# Patient Record
Sex: MALE | Race: WHITE | NOT HISPANIC OR LATINO | Employment: FULL TIME | URBAN - METROPOLITAN AREA
[De-identification: names, ages, dates, MRNs, and addresses within clinical notes are randomized per-mention and may not be internally consistent; named-entity substitution may affect disease eponyms.]

---

## 2017-01-05 ENCOUNTER — TELEPHONE (OUTPATIENT)
Dept: HEALTH INFORMATION MANAGEMENT | Facility: OTHER | Age: 27
End: 2017-01-05

## 2017-01-05 NOTE — TELEPHONE ENCOUNTER
I called patient and scheduled an appointment for 1/23/17 at 4:30PM. Instructed patient to bring in his old immunization records. - Care gap outreach

## 2017-01-05 NOTE — TELEPHONE ENCOUNTER
Received the following [employer] message from patient:    Appointment Request From: Justice Infante           With Provider: Demar Dobson M.D. [[address]]          Preferred Date Range: From 2/6/2017 To 3/27/2017          Preferred Times: Monday Afternoon, Tuesday Afternoon, Wednesday Afternoon, Thursday Afternoon, Friday Afternoon          Reason: To address the following health maintenance concerns.     Imm Hep B Vaccine     Imm Hep A Vaccine     Imm Hpv Vaccine     Imm Varicella (Chickenpox) Vaccine     Imm Dtap/Tdap/Td Vaccine     Imm Influenza          Comments:     Latest appointment possible during the day. Going to bring in my immunization record and see if I need anything.           thank you

## 2017-01-26 ENCOUNTER — OFFICE VISIT (OUTPATIENT)
Dept: MEDICAL GROUP | Facility: MEDICAL CENTER | Age: 27
End: 2017-01-26
Payer: COMMERCIAL

## 2017-01-26 VITALS
WEIGHT: 241 LBS | SYSTOLIC BLOOD PRESSURE: 132 MMHG | BODY MASS INDEX: 31.94 KG/M2 | DIASTOLIC BLOOD PRESSURE: 82 MMHG | HEART RATE: 66 BPM | OXYGEN SATURATION: 99 % | RESPIRATION RATE: 16 BRPM | HEIGHT: 73 IN | TEMPERATURE: 97 F

## 2017-01-26 DIAGNOSIS — Z00.00 WELLNESS EXAMINATION: ICD-10-CM

## 2017-01-26 DIAGNOSIS — Z23 INFLUENZA VACCINE NEEDED: ICD-10-CM

## 2017-01-26 PROCEDURE — 90686 IIV4 VACC NO PRSV 0.5 ML IM: CPT | Performed by: INTERNAL MEDICINE

## 2017-01-26 PROCEDURE — 90471 IMMUNIZATION ADMIN: CPT | Performed by: INTERNAL MEDICINE

## 2017-01-26 PROCEDURE — 99395 PREV VISIT EST AGE 18-39: CPT | Mod: 25 | Performed by: INTERNAL MEDICINE

## 2017-01-26 ASSESSMENT — PATIENT HEALTH QUESTIONNAIRE - PHQ9: CLINICAL INTERPRETATION OF PHQ2 SCORE: 0

## 2017-01-26 NOTE — MR AVS SNAPSHOT
"        Justice Infante   2017 3:40 PM   Office Visit   MRN: [id number]    Department:  [address] [place] [address] Group   Dept Phone:  [phone]    Description:  Male : 1990   Provider:  Demar Dobson M.D.           Reason for Visit     Follow-Up paper insurance, and shots      Allergies as of 2017     No Known Allergies      You were diagnosed with     Influenza vaccine needed   [[id number]]       Wellness examination   [3552075]         Vital Signs     Blood Pressure Pulse Temperature Respirations Height Weight    132/82 mmHg 66 36.1 °C (97 °F) 16 1.854 m (6' 1\") 109.317 kg (241 lb)    Body Mass Index Oxygen Saturation Smoking Status             31.80 kg/m2 99% Former Smoker         Basic Information     Date Of Birth Sex Race Ethnicity Preferred Language    1990 Male White Non- English      Your appointments     2017  4:00 PM   Follow Up Med Management with Corrine Mcdonough M.D.   [facility]IORAL [facility] [address] [place] [address])    [address] [place] [address]   [phone]              Problem List              ICD-10-CM Priority Class Noted - Resolved    Anxiety F41.9   2015 - Present    OCD (obsessive compulsive disorder) F42.9   2016 - Present    Depression F32.9   2016 - Present      Health Maintenance        Date Due Completion Dates    IMM HEP B VACCINE (1 of 3 - Primary Series) 1990 ---    IMM HEP A VACCINE (1 of 2 - Standard Series) 1991 ---    IMM HPV VACCINE (1 of 3 - Male 3 Dose Series) 2001 ---    IMM VARICELLA (CHICKENPOX) VACCINE (1 of 2 - 2 Dose Adolescent Series) 2003 ---    IMM INFLUENZA (1) 2016 10/10/2015    IMM DTaP/Tdap/Td Vaccine (2 - Td) 2017            Current Immunizations     Influenza Vaccine Quad Inj (Pf) 2017  4:22 PM    Influenza Vaccine Quad Inj (Preserved) 10/10/2015    Tdap Vaccine 2007      Below and/or attached are the medications your provider expects you to take. Review " all of your home medications and newly ordered medications with your provider and/or pharmacist. Follow medication instructions as directed by your provider and/or pharmacist. Please keep your medication list with you and share with your provider. Update the information when medications are discontinued, doses are changed, or new medications (including over-the-counter products) are added; and carry medication information at all times in the event of emergency situations     Allergies:  No Known Allergies          Medications  Valid as of: January 26, 2017 -  4:43 PM    Generic Name Brand Name Tablet Size Instructions for use    ALPRAZolam (Tab) XANAX 0.5 MG TAKE ONE TABLET BY MOUTH ONCE DAILY AT BEDTIME AS NEEDED FOR ANXIETY        FLUoxetine HCl (Cap) PROZAC 40 MG Take 2 Caps by mouth every day.        Ibuprofen (Tab) MOTRIN 600 MG Take 1 Tab by mouth every 8 hours as needed.        Multiple Vitamins-Minerals (Tab) THERAGRAN-M  Take 1 Tab by mouth every day.        .                 Medicines prescribed today were sent to:     [facility] [address] [place] [address] [address] [place], [place] [address] [address] [place] [address]    Phone: [phone] Fax: [phone]    Open 24 Hours?: No      Medication refill instructions:       If your prescription bottle indicates you have medication refills left, it is not necessary to call your provider’s office. Please contact your pharmacy and they will refill your medication.    If your prescription bottle indicates you do not have any refills left, you may request refills at any time through one of the following ways: The online [employer] system (except Urgent Care), by calling your provider’s office, or by asking your pharmacy to contact your provider’s office with a refill request. Medication refills are processed only during regular business hours and may not be available until the next business day. Your provider may request additional information or to have a follow-up visit with you  prior to refilling your medication.   *Please Note: Medication refills are assigned a new Rx number when refilled electronically. Your pharmacy may indicate that no refills were authorized even though a new prescription for the same medication is available at the pharmacy. Please request the medicine by name with the pharmacy before contacting your provider for a refill.           [employer] Access Code: Activation code not generated  Current [employer] Status: Active

## 2017-01-27 NOTE — PROGRESS NOTES
"CC: Wellness examination    HPI:   Justice presents today with the following.    1. Wellness examination  Presents wellness examination with no particular complaints. He has been seen by psychiatry reports mood is doing very well on current medication regimen. He denies a physical place no chest pain or shortness of breath. Weight is still elevated he is trying to diet and exercise and his quit smoking and drinking alcohol. He is due for a flu shot and will need some vaccinations prior to school coming due in July.    2. Influenza vaccine needed  Requesting vaccination      Patient Active Problem List    Diagnosis Date Noted   • OCD (obsessive compulsive disorder) 07/13/2016   • Depression 07/13/2016   • Anxiety 06/05/2015       Current Outpatient Prescriptions   Medication Sig Dispense Refill   • fluoxetine (PROZAC) 40 MG capsule Take 2 Caps by mouth every day. 60 Cap 3   • alprazolam (XANAX) 0.5 MG Tab TAKE ONE TABLET BY MOUTH ONCE DAILY AT BEDTIME AS NEEDED FOR ANXIETY 15 Tab 1   • therapeutic multivitamin-minerals (THERAGRAN-M) Tab Take 1 Tab by mouth every day.     • ibuprofen (MOTRIN) 600 MG Tab Take 1 Tab by mouth every 8 hours as needed.       No current facility-administered medications for this visit.         Allergies as of 01/26/2017   • (No Known Allergies)        ROS: As per HPI.    /82 mmHg  Pulse 66  Temp(Src) 36.1 °C (97 °F)  Resp 16  Ht 1.854 m (6' 1\")  Wt 109.317 kg (241 lb)  BMI 31.80 kg/m2  SpO2 99%    Physical Exam:  Gen:         Alert and oriented, No apparent distress.  Neck:        No Lymphadenopathy or Bruits.  Lungs:     Clear to auscultation bilaterally  CV:          Regular rate and rhythm. No murmurs, rubs or gallops.               Ext:          No clubbing, cyanosis, edema.      Assessment and Plan.   26 y.o. male with the following issues.    1. Wellness examination  Discussed healthy lifestyle habits as well as screening regimens.    2. Influenza vaccine needed    - " INFLUENZA VACCINE QUAD INJ >3Y(PF)

## 2017-02-17 ENCOUNTER — OFFICE VISIT (OUTPATIENT)
Dept: BEHAVIORAL HEALTH | Facility: PHYSICIAN GROUP | Age: 27
End: 2017-02-17
Payer: COMMERCIAL

## 2017-02-17 VITALS
WEIGHT: 246 LBS | BODY MASS INDEX: 32.6 KG/M2 | DIASTOLIC BLOOD PRESSURE: 84 MMHG | HEART RATE: 69 BPM | HEIGHT: 73 IN | SYSTOLIC BLOOD PRESSURE: 143 MMHG

## 2017-02-17 DIAGNOSIS — F34.1 DYSTHYMIA: ICD-10-CM

## 2017-02-17 DIAGNOSIS — F41.9 ANXIETY: ICD-10-CM

## 2017-02-17 PROCEDURE — 99213 OFFICE O/P EST LOW 20 MIN: CPT | Performed by: PSYCHIATRY & NEUROLOGY

## 2017-02-17 RX ORDER — FLUOXETINE HYDROCHLORIDE 40 MG/1
80 CAPSULE ORAL DAILY
Qty: 60 CAP | Refills: 3 | Status: SHIPPED | OUTPATIENT
Start: 2017-02-17 | End: 2017-03-24 | Stop reason: SDUPTHER

## 2017-02-17 NOTE — PROGRESS NOTES
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 He has been doing pretty good since his last visit here and reports stable mood and anxiety symptoms. He has been compliant with his Prozac and feels that it has been helping his mood and anxiety and denies any side effects. He sees his therapist twice in a month and have been working on coping skills which he seems to have improved on. Denies feeling irritable or agitated. Denies anhedonia, enjoys hanging out with a friend. He does not have much support or contact with his family who live in [place] and he is okay with that. He is trying to focus on him self at this time. He moved into his own apartment and is feeling good about it. Sleep and appetite good. Denies any self-harm behaviors. Denies any current legal problems. Denies any thoughts of wanting to hurt himself or others.    Social History:   Single currently. Never , no kids. Lives alone in [place]. Employed, has been working as a  for 1 year.    Substance Use:  Alcohol - Social drinking, drinks once a month.   Nicotine - Denies   Illicit drugs - Denies recent use    Past Medication Trials:  Lithium (s/e - weight gain), Trazodone, Wellbutrin, Zoloft, Seroquel, Ativan     Medications:  Current Outpatient Prescriptions   Medication Sig Dispense Refill   • fluoxetine (PROZAC) 40 MG capsule Take 2 Caps by mouth every day. 60 Cap 3   • alprazolam (XANAX) 0.5 MG Tab TAKE ONE TABLET BY MOUTH ONCE DAILY AT BEDTIME AS NEEDED FOR ANXIETY 15 Tab 1   • therapeutic multivitamin-minerals (THERAGRAN-M) Tab Take 1 Tab by mouth every day.     • ibuprofen (MOTRIN) 600 MG Tab Take 1 Tab by mouth every 8 hours as needed.       No  "current facility-administered medications for this visit.       Review Of Systems:    Constitutional - Negative for fatigue  Respiratory - Negative for shortness of breath, cough  CVS - Negative for chest pain, palpitations  GI - Negative for nausea, vomiting, abdominal pain, diarrhea, constipation  Musculoskeletal - Negative for back pain  Neurological - Negative for headaches  Psychiatric - Positive for occasional anxiety. Negative for depression.    Physical Examination:  Vital signs: /84 mmHg  Pulse 69  Ht 1.854 m (6' 0.99\")  Wt 111.585 kg (246 lb)  BMI 32.46 kg/m2    Musculoskeletal: Normal gait. No abnormal movements.     Mental Status Evaluation:   General: Young white male, dressed in casual attire, good grooming and hygiene, in no apparent distress, calm and cooperative, good eye contact, no psychomotor agitation or retardation  Orientation: Alert and oriented to person, place and time  Recent and remote memory: Grossly intact  Attention span and concentration: Grossly intact  Speech: Spontaneous, normal rate, rhythm and tone  Thought Process: Linear, logical and goal directed  Thought Content: Denies suicidal or homicidal ideations, intent or plan  Perception: Denies auditory or visual hallucinations. No delusions noted  Associations: Intact  Language: Appropriate  Fund of knowledge and vocabulary: Grossly adequate  Mood: \"am doing really good\"  Affect: Euthymic, mood congruent  Insight: Good  Judgment: Good      Impression:  1. Persistent depressive disorder or dysthymia   2. Unspecified anxiety disorder  3. Borderline and avoidant personality disorder traits - chronic feelings of emptiness, problems with self worth and confidence, unstable relationship, fear of abandonment, history of self harm behaviors, fear of criticism and rejection.    Medical Records/Labs/Diagnostic Tests Reviewed:  [facility] records - appropriate refills     Plan:  1. Continue Prozac 80 mg daily for mood and anxiety  2. " Continue Xanax 0.5 mg daily as needed for anxiety. Not refilled today.  3. Continue psychotherapy with ANNE Powers.    Return to clinic in 4 months or sooner if symptoms worsen    The proposed treatment plan was discussed with the patient who was provided the opportunity to ask questions and make suggestions regarding alternative treatment. Patient verbalized understanding and expressed agreement with the plan.     Corrine Mcdonough M.D.  02/17/2017    This note was created using voice recognition software (Dragon). The accuracy of the dictation is limited by the abilities of the software. I have reviewed the note prior to signing, however some errors in grammar and context are still possible. If you have any questions related to this note please do not hesitate to contact our office.

## 2017-03-24 ENCOUNTER — HOSPITAL ENCOUNTER (OUTPATIENT)
Dept: LAB | Facility: MEDICAL CENTER | Age: 27
End: 2017-03-24
Attending: INTERNAL MEDICINE
Payer: COMMERCIAL

## 2017-03-24 ENCOUNTER — OFFICE VISIT (OUTPATIENT)
Dept: MEDICAL GROUP | Facility: MEDICAL CENTER | Age: 27
End: 2017-03-24
Payer: COMMERCIAL

## 2017-03-24 VITALS
OXYGEN SATURATION: 96 % | SYSTOLIC BLOOD PRESSURE: 128 MMHG | WEIGHT: 252 LBS | BODY MASS INDEX: 33.4 KG/M2 | RESPIRATION RATE: 16 BRPM | HEIGHT: 73 IN | HEART RATE: 64 BPM | TEMPERATURE: 98.7 F | DIASTOLIC BLOOD PRESSURE: 72 MMHG

## 2017-03-24 DIAGNOSIS — Z01.84 IMMUNITY STATUS TESTING: ICD-10-CM

## 2017-03-24 DIAGNOSIS — E16.2 HYPOGLYCEMIA: ICD-10-CM

## 2017-03-24 DIAGNOSIS — Z23 NEED FOR TDAP VACCINATION: ICD-10-CM

## 2017-03-24 DIAGNOSIS — F41.9 ANXIETY: ICD-10-CM

## 2017-03-24 DIAGNOSIS — R10.13 DYSPEPSIA: ICD-10-CM

## 2017-03-24 LAB — HBV SURFACE AB SER RIA-ACNC: 16.92 MIU/ML (ref 0–10)

## 2017-03-24 PROCEDURE — 90715 TDAP VACCINE 7 YRS/> IM: CPT | Performed by: INTERNAL MEDICINE

## 2017-03-24 PROCEDURE — 99214 OFFICE O/P EST MOD 30 MIN: CPT | Mod: 25 | Performed by: INTERNAL MEDICINE

## 2017-03-24 PROCEDURE — 90471 IMMUNIZATION ADMIN: CPT | Performed by: INTERNAL MEDICINE

## 2017-03-24 PROCEDURE — 36415 COLL VENOUS BLD VENIPUNCTURE: CPT

## 2017-03-24 PROCEDURE — 86706 HEP B SURFACE ANTIBODY: CPT

## 2017-03-24 RX ORDER — FLUOXETINE HYDROCHLORIDE 40 MG/1
80 CAPSULE ORAL DAILY
Qty: 180 CAP | Refills: 3 | Status: SHIPPED | OUTPATIENT
Start: 2017-03-24 | End: 2018-02-22 | Stop reason: SDUPTHER

## 2017-03-24 NOTE — MR AVS SNAPSHOT
"        Justice Infante   3/24/2017 7:40 AM   Office Visit   MRN: [id number]    Department:  [address] [place] [address] Group   Dept Phone:  [phone]    Description:  Male : 1990   Provider:  Demar Dobson M.D.           Reason for Visit     Cold Exposure     Immunizations           Allergies as of 3/24/2017     No Known Allergies      You were diagnosed with     Anxiety   [082387]       Dyspepsia   [870242]       Hypoglycemia   [172982]       BMI 33.0-33.9,adult   [298472]       Immunity status testing   [008743]       Need for Tdap vaccination   [[id number]]         Vital Signs     Blood Pressure Pulse Temperature Respirations Height Weight    128/72 mmHg 64 37.1 °C (98.7 °F) 16 1.854 m (6' 1\") 114.306 kg (252 lb)    Body Mass Index Oxygen Saturation Smoking Status             33.25 kg/m2 96% Former Smoker         Basic Information     Date Of Birth Sex Race Ethnicity Preferred Language    1990 Male White Non- English      Your appointments     2017  4:00 PM   Follow Up Med Management with Corrine Mcdonough M.D.   BEHAVIORAL HEALTH [address] [place] [address])    [address] [place] [address]  [address] [place] [address]   [phone]              Problem List              ICD-10-CM Priority Class Noted - Resolved    Anxiety F41.9   2015 - Present    OCD (obsessive compulsive disorder) F42.9   2016 - Present    Dysthymia F34.1   2017 - Present    Dyspepsia R10.13   3/24/2017 - Present      Health Maintenance        Date Due Completion Dates    IMM HEP B VACCINE (1 of 3 - Primary Series) 1990 ---    IMM HEP A VACCINE (1 of 2 - Standard Series) 1991 ---    IMM HPV VACCINE (1 of 3 - Male 3 Dose Series) 2001 ---    IMM VARICELLA (CHICKENPOX) VACCINE (1 of 2 - 2 Dose Adolescent Series) 2003 ---    IMM DTaP/Tdap/Td Vaccine (2 - Td) 2017            Current Immunizations     Influenza Vaccine Quad Inj (Pf) 2017  4:22 PM    Influenza Vaccine Quad Inj (Preserved) " 10/10/2015    Tdap Vaccine 3/24/2017  8:23 AM, 7/25/2007      Below and/or attached are the medications your provider expects you to take. Review all of your home medications and newly ordered medications with your provider and/or pharmacist. Follow medication instructions as directed by your provider and/or pharmacist. Please keep your medication list with you and share with your provider. Update the information when medications are discontinued, doses are changed, or new medications (including over-the-counter products) are added; and carry medication information at all times in the event of emergency situations     Allergies:  No Known Allergies          Medications  Valid as of: March 24, 2017 -  8:39 AM    Generic Name Brand Name Tablet Size Instructions for use    ALPRAZolam (Tab) XANAX 0.5 MG TAKE ONE TABLET BY MOUTH ONCE DAILY AT BEDTIME AS NEEDED FOR ANXIETY        FLUoxetine HCl (Cap) PROZAC 40 MG Take 2 Caps by mouth every day.        Multiple Vitamins-Minerals (Tab) THERAGRAN-M  Take 1 Tab by mouth every day.        .                 Medicines prescribed today were sent to:     [facility] [address] [place], [place] [address] [address] [place], [place] [address] [address] [place] [address]    Phone: [phone] Fax: [phone]    Open 24 Hours?: No      Medication refill instructions:       If your prescription bottle indicates you have medication refills left, it is not necessary to call your provider’s office. Please contact your pharmacy and they will refill your medication.    If your prescription bottle indicates you do not have any refills left, you may request refills at any time through one of the following ways: The online [employer] system (except Urgent Care), by calling your provider’s office, or by asking your pharmacy to contact your provider’s office with a refill request. Medication refills are processed only during regular business hours and may not be available until the next business day. Your provider may request  additional information or to have a follow-up visit with you prior to refilling your medication.   *Please Note: Medication refills are assigned a new Rx number when refilled electronically. Your pharmacy may indicate that no refills were authorized even though a new prescription for the same medication is available at the pharmacy. Please request the medicine by name with the pharmacy before contacting your provider for a refill.        Your To Do List     Future Labs/Procedures Complete By Expires    HEP B SURFACE AB  As directed 3/25/2018         [employer] Access Code: Activation code not generated  Current [employer] Status: Active

## 2017-03-24 NOTE — PROGRESS NOTES
"CC: Follow-up multiple issues    HPI:   Justice presents today with the following.    1. Anxiety  Presents requesting that his medications be filled for 3 months. He reports his anxiety is doing significantly better and is not taking the Xanax frequently.    2. Dyspepsia  Main complaint is episodes of dyspepsia. He reports an epigastric discomfort after meals. Has happened 3 times times after eating in the last 2 months. He denies any nausea or vomiting no blood in stool or dark tarry stool. He reports after belching pain significantly improved.     3. Hypoglycemia  He also reports symptoms of sweating and slight weakness after a walk in the late a.m. He states he typically eats a large carbohydrate breakfast with lots of coffee. He denied any chest pain or palpitations and felt better after eating.    4. BMI 33.0-33.9,adult  Weight persistently elevated he is trying to exercise.    5. Immunity status testing  Question weathers had hepatitis B vaccines.    6. Need for Tdap vaccination  [place] 2007.      Patient Active Problem List    Diagnosis Date Noted   • Dyspepsia 03/24/2017   • Dysthymia 02/17/2017   • OCD (obsessive compulsive disorder) 07/13/2016   • Anxiety 06/05/2015       Current Outpatient Prescriptions   Medication Sig Dispense Refill   • fluoxetine (PROZAC) 40 MG capsule Take 2 Caps by mouth every day. 180 Cap 3   • alprazolam (XANAX) 0.5 MG Tab TAKE ONE TABLET BY MOUTH ONCE DAILY AT BEDTIME AS NEEDED FOR ANXIETY 15 Tab 1   • therapeutic multivitamin-minerals (THERAGRAN-M) Tab Take 1 Tab by mouth every day.       No current facility-administered medications for this visit.         Allergies as of 03/24/2017   • (No Known Allergies)        ROS: As per HPI.    /72 mmHg  Pulse 64  Temp(Src) 37.1 °C (98.7 °F)  Resp 16  Ht 1.854 m (6' 1\")  Wt 114.306 kg (252 lb)  BMI 33.25 kg/m2  SpO2 96%    Physical Exam:  Gen:         Alert and oriented, No apparent distress.  Neck:        No Lymphadenopathy " or Bruits.  Lungs:     Clear to auscultation bilaterally  CV:          Regular rate and rhythm. No murmurs, rubs or gallops.               Ext:          No clubbing, cyanosis, edema.      Assessment and Plan.   26 y.o. male with the following issues.    1. Anxiety  Clinically stable refilled medications.  - fluoxetine (PROZAC) 40 MG capsule; Take 2 Caps by mouth every day.  Dispense: 180 Cap; Refill: 3    2. Dyspepsia  Symptoms most consistent with possible acid process discussed dietary changes placed on omeprazole for the next 2 weeks if symptoms persist consider gastroenterology.    3. Hypoglycemia  Symptoms most consistent with hypoglycemia again discussion about dietary changes symptoms persist he will follow-up.    4. BMI 33.0-33.9,adult  Discussed diet exercise and weight loss strategies. Have set a goal for 15 pounds in 3 months and will followup at that time.  - Patient identified as having weight management issue.  Appropriate orders and counseling given.    5. Immunity status testing    - HEP B SURFACE AB; Future    6. Need for Tdap vaccination    - TDAP VACCINE =>6YO IM

## 2017-04-26 ENCOUNTER — OFFICE VISIT (OUTPATIENT)
Dept: URGENT CARE | Facility: PHYSICIAN GROUP | Age: 27
End: 2017-04-26
Payer: COMMERCIAL

## 2017-04-26 VITALS
HEART RATE: 66 BPM | HEIGHT: 73 IN | WEIGHT: 250 LBS | SYSTOLIC BLOOD PRESSURE: 126 MMHG | BODY MASS INDEX: 33.13 KG/M2 | OXYGEN SATURATION: 100 % | DIASTOLIC BLOOD PRESSURE: 84 MMHG | TEMPERATURE: 98.8 F | RESPIRATION RATE: 12 BRPM

## 2017-04-26 DIAGNOSIS — L20.9 ATOPIC DERMATITIS, UNSPECIFIED TYPE: ICD-10-CM

## 2017-04-26 PROCEDURE — 99213 OFFICE O/P EST LOW 20 MIN: CPT | Performed by: PHYSICIAN ASSISTANT

## 2017-04-26 RX ORDER — HYDROXYZINE HYDROCHLORIDE 25 MG/1
TABLET, FILM COATED ORAL
Qty: 30 TAB | Refills: 0 | Status: SHIPPED | OUTPATIENT
Start: 2017-04-26 | End: 2017-10-12

## 2017-04-26 RX ORDER — TRIAMCINOLONE ACETONIDE 0.25 MG/ML
LOTION TOPICAL
Qty: 1 BOTTLE | Refills: 0 | Status: SHIPPED | OUTPATIENT
Start: 2017-04-26 | End: 2018-03-07

## 2017-04-26 ASSESSMENT — ENCOUNTER SYMPTOMS
SORE THROAT: 0
EYE PAIN: 0
COUGH: 0
RHINORRHEA: 0
NAIL CHANGES: 0
DIARRHEA: 0
FATIGUE: 0
FEVER: 0

## 2017-04-26 NOTE — PROGRESS NOTES
"Subjective:      Justice Infante is a 26 y.o. male who presents with Rash            Rash  This is a new problem. Episode onset: 10 days. The problem has been gradually worsening since onset. Location: under the right eye lid. The rash is characterized by itchiness. He was exposed to nothing. Pertinent negatives include no cough, diarrhea, eye pain, facial edema, fatigue, fever, joint pain, nail changes, rhinorrhea or sore throat. Treatments tried: otc steroids and otc antifungals. The treatment provided no relief.       Review of Systems   Constitutional: Negative for fever and fatigue.   HENT: Negative for rhinorrhea and sore throat.    Eyes: Negative for pain.   Respiratory: Negative for cough.    Gastrointestinal: Negative for diarrhea.   Musculoskeletal: Negative for joint pain.   Skin: Positive for rash. Negative for nail changes.          Objective:     /84 mmHg  Pulse 66  Temp(Src) 37.1 °C (98.8 °F)  Resp 12  Ht 1.854 m (6' 1\")  Wt 113.399 kg (250 lb)  BMI 32.99 kg/m2  SpO2 100%     Physical Exam   Eyes:              Gen.: Patient is A and O ×3, no acute distress, well-nourished well-hydrated  Vitals: Are listed and unremarkable  HEENT: Heads normocephalic, atraumatic, PERRLA, extraocular movements intact, TMs and oropharynx clear. Patient's lower and upper lid of the right eye has some redness and dryness associated with it. Some small non-fluid-filled papules that are less than 1 mm in diameter. Patient denies pain to palpation but states it is very itchy  Neck: Soft supple without cervical lymphadenopathy  Cardiovascular: Regular rate and rhythm normal S1 and S2. No murmurs, rubs or gallops  Lungs are clear to auscultation bilaterally. no wheezes rales or rhonchi  Abdomen is soft, nontender, nondistended with good bowel sounds, no hepatosplenomegaly  Skin: Is well perfused without evidence of rash or lesions  Neurological:  cranial nerves II through XII were assessed and " intact.  Musculoskeletal: Full range of motion, 5 out of 5 strength against resistance  Neurovascularly: Intact with a 2 second cap refill, good distal pulses       Assessment/Plan:     1. Atopic dermatitis, unspecified type   If symptoms persist or worsen please follow up with us within the next week. He may need further evaluation by dermatology- Triamcinolone Acetonide 0.025 % Lotion; Apply sparingly to affected area twice daily for seven days  Dispense: 1 Bottle; Refill: 0  - hydrOXYzine (ATARAX) 25 MG Tab; Take one tablet up to three times a day as needed for itch  Dispense: 30 Tab; Refill: 0

## 2017-04-26 NOTE — LETTER
April 26, 2017         Patient: Justice Infante   YOB: 1990   Date of Visit: 4/26/2017           To Whom it May Concern:    Justice Infante was seen in my clinic on 4/26/2017. He may return to work on April 27, 2017.  If you have any questions or concerns, please don't hesitate to call.        Sincerely,           Mary Mathis PA-C  Electronically Signed

## 2017-04-26 NOTE — MR AVS SNAPSHOT
"        Justice Infante   2017 3:25 PM   Office Visit   MRN: [id number]    Department:  [facility]   Dept Phone:  [phone]    Description:  Male : 1990   Provider:  Mary Mathis PA-C           Reason for Visit     Rash Under R eyelid x 1.5 weeks getting worse      Allergies as of 2017     No Known Allergies      You were diagnosed with     Atopic dermatitis, unspecified type   [[id number]]         Vital Signs     Blood Pressure Pulse Temperature Respirations Height Weight    126/84 mmHg 66 37.1 °C (98.8 °F) 12 1.854 m (6' 1\") 113.399 kg (250 lb)    Body Mass Index Oxygen Saturation Smoking Status             32.99 kg/m2 100% Former Smoker         Basic Information     Date Of Birth Sex Race Ethnicity Preferred Language    1990 Male White Non- English      Your appointments     2017  4:00 PM   Follow Up Med Management with Corrine Mcdonough M.D.   [facility] [address] [place] [address])    [address] [place] [address]   [phone]              Problem List              ICD-10-CM Priority Class Noted - Resolved    Anxiety F41.9   2015 - Present    OCD (obsessive compulsive disorder) F42.9   2016 - Present    Dysthymia F34.1   2017 - Present    Dyspepsia R10.13   3/24/2017 - Present      Health Maintenance        Date Due Completion Dates    IMM HEP B VACCINE (1 of 3 - Primary Series) 1990 ---    IMM HEP A VACCINE (1 of 2 - Standard Series) 1991 ---    IMM HPV VACCINE (1 of 3 - Male 3 Dose Series) 2001 ---    IMM VARICELLA (CHICKENPOX) VACCINE (1 of 2 - 2 Dose Adolescent Series) 2003 ---    IMM DTaP/Tdap/Td Vaccine (3 - Td) 3/24/2027 3/24/2017, 2007            Current Immunizations     Influenza Vaccine Quad Inj (Pf) 2017  4:22 PM    Influenza Vaccine Quad Inj (Preserved) 10/10/2015    Tdap Vaccine 3/24/2017  8:23 AM, 2007      Below and/or attached are the medications your provider expects you to " take. Review all of your home medications and newly ordered medications with your provider and/or pharmacist. Follow medication instructions as directed by your provider and/or pharmacist. Please keep your medication list with you and share with your provider. Update the information when medications are discontinued, doses are changed, or new medications (including over-the-counter products) are added; and carry medication information at all times in the event of emergency situations     Allergies:  No Known Allergies          Medications  Valid as of: April 26, 2017 -  4:10 PM    Generic Name Brand Name Tablet Size Instructions for use    ALPRAZolam (Tab) XANAX 0.5 MG TAKE ONE TABLET BY MOUTH ONCE DAILY AT BEDTIME AS NEEDED FOR ANXIETY        FLUoxetine HCl (Cap) PROZAC 40 MG Take 2 Caps by mouth every day.        HydrOXYzine HCl (Tab) ATARAX 25 MG Take one tablet up to three times a day as needed for itch        Multiple Vitamins-Minerals (Tab) THERAGRAN-M  Take 1 Tab by mouth every day.        Triamcinolone Acetonide (Lotion) Triamcinolone Acetonide 0.025 % Apply sparingly to affected area twice daily for seven days        .                 Medicines prescribed today were sent to:     [facility] [address] [place] [address] - [address] [facility]    [address] [place] [address]    Phone: [phone] Fax: [phone]    Open 24 Hours?: No      Medication refill instructions:       If your prescription bottle indicates you have medication refills left, it is not necessary to call your provider’s office. Please contact your pharmacy and they will refill your medication.    If your prescription bottle indicates you do not have any refills left, you may request refills at any time through one of the following ways: The online Soysuper system (except Urgent Care), by calling your provider’s office, or by asking your pharmacy to contact your provider’s office with a refill request. Medication refills are processed only during  regular business hours and may not be available until the next business day. Your provider may request additional information or to have a follow-up visit with you prior to refilling your medication.   *Please Note: Medication refills are assigned a new Rx number when refilled electronically. Your pharmacy may indicate that no refills were authorized even though a new prescription for the same medication is available at the pharmacy. Please request the medicine by name with the pharmacy before contacting your provider for a refill.           Qewz Access Code: Activation code not generated  Current Qewz Status: Active

## 2017-05-10 ENCOUNTER — TELEPHONE (OUTPATIENT)
Dept: URGENT CARE | Facility: CLINIC | Age: 27
End: 2017-05-10

## 2017-05-10 DIAGNOSIS — L98.9 SKIN DISORDER: ICD-10-CM

## 2017-05-10 NOTE — TELEPHONE ENCOUNTER
----- Message from Adán Ibarra sent at 5/9/2017  3:13 PM PDT -----  Regarding: FW: Procedure Question  Contact: [phone]      ----- Message -----     From: Justice Infante     Sent: 5/9/2017   2:51 PM       To: [facility]  Message Pool  Subject: Procedure Question                               Hello,    I wanted to reach out to you, my eye rash is not getting any better. Is there anyway you can submit that referral to the dermatologist? I am getting worried.    thank you

## 2017-05-11 ENCOUNTER — OFFICE VISIT (OUTPATIENT)
Dept: MEDICAL GROUP | Facility: MEDICAL CENTER | Age: 27
End: 2017-05-11
Payer: COMMERCIAL

## 2017-05-11 VITALS
DIASTOLIC BLOOD PRESSURE: 76 MMHG | HEART RATE: 94 BPM | RESPIRATION RATE: 16 BRPM | WEIGHT: 254 LBS | SYSTOLIC BLOOD PRESSURE: 128 MMHG | OXYGEN SATURATION: 95 % | HEIGHT: 73 IN | BODY MASS INDEX: 33.66 KG/M2 | TEMPERATURE: 98.1 F

## 2017-05-11 DIAGNOSIS — R21 RASH: ICD-10-CM

## 2017-05-11 PROBLEM — R10.13 DYSPEPSIA: Status: RESOLVED | Noted: 2017-03-24 | Resolved: 2017-05-11

## 2017-05-11 PROCEDURE — 99213 OFFICE O/P EST LOW 20 MIN: CPT | Performed by: INTERNAL MEDICINE

## 2017-05-11 RX ORDER — DOXYCYCLINE HYCLATE 100 MG
100 TABLET ORAL 2 TIMES DAILY
Qty: 14 TAB | Refills: 0 | Status: SHIPPED | OUTPATIENT
Start: 2017-05-11 | End: 2017-05-22 | Stop reason: SDUPTHER

## 2017-05-11 NOTE — PROGRESS NOTES
"CC: Rash    HPI:   Justice presents today with the following.    1. Rash  Presents complaining of a rash first appeared under his right eye approximately 3 weeks ago. He was placed on an antifungal originally with no improvement. He was seen again in place on a steroid cream which she's been on for week that is not improved. He has no fevers or chills not affecting the eye itself. Overall he feels well without any other systemic complaints.      Patient Active Problem List    Diagnosis Date Noted   • Dysthymia 02/17/2017   • OCD (obsessive compulsive disorder) 07/13/2016   • Anxiety 06/05/2015       Current Outpatient Prescriptions   Medication Sig Dispense Refill   • doxycycline (VIBRAMYCIN) 100 MG Tab Take 1 Tab by mouth 2 times a day for 7 days. 14 Tab 0   • Triamcinolone Acetonide 0.025 % Lotion Apply sparingly to affected area twice daily for seven days 1 Bottle 0   • hydrOXYzine (ATARAX) 25 MG Tab Take one tablet up to three times a day as needed for itch 30 Tab 0   • fluoxetine (PROZAC) 40 MG capsule Take 2 Caps by mouth every day. 180 Cap 3   • alprazolam (XANAX) 0.5 MG Tab TAKE ONE TABLET BY MOUTH ONCE DAILY AT BEDTIME AS NEEDED FOR ANXIETY 15 Tab 1   • therapeutic multivitamin-minerals (THERAGRAN-M) Tab Take 1 Tab by mouth every day.       No current facility-administered medications for this visit.         Allergies as of 05/11/2017   • (No Known Allergies)        ROS: As per HPI.    /76 mmHg  Pulse 94  Temp(Src) 36.7 °C (98.1 °F)  Resp 16  Ht 1.854 m (6' 1\")  Wt 115.214 kg (254 lb)  BMI 33.52 kg/m2  SpO2 95%    Physical Exam:  Gen:         Alert and oriented, No apparent distress.  Neck:        No Lymphadenopathy or Bruits.  Lungs:     Clear to auscultation bilaterally  CV:          Regular rate and rhythm. No murmurs, rubs or gallops.               Ext:          No clubbing, cyanosis, edema.      Assessment and Plan.   26 y.o. male with the following issues.    1. Rash  Erythematous " maculopapular rash affecting under the right eye is of the lid. Given the multiple trials have placed on doxycycline for the next 7 days not improving may consider referral to optho vs derm.

## 2017-05-11 NOTE — MR AVS SNAPSHOT
"        Justice Infante   2017 1:40 PM   Office Visit   MRN: [id number]    Department:  [address] [place] [address] Group   Dept Phone:  [phone]    Description:  Male : 1990   Provider:  Demar Dobson M.D.           Reason for Visit     Rash Right eye      Allergies as of 2017     No Known Allergies      You were diagnosed with     Rash   [[id number]]         Vital Signs     Blood Pressure Pulse Temperature Respirations Height Weight    128/76 mmHg 94 36.7 °C (98.1 °F) 16 1.854 m (6' 1\") 115.214 kg (254 lb)    Body Mass Index Oxygen Saturation Smoking Status             33.52 kg/m2 95% Former Smoker         Basic Information     Date Of Birth Sex Race Ethnicity Preferred Language    1990 Male White Non- English      Your appointments     2017  4:00 PM   Follow Up Med Management with Corrine Mcdonough M.D.   BEHAVIORAL HEALTH [address] [place] [address])    [address] [place] [address]   [phone]              Problem List              ICD-10-CM Priority Class Noted - Resolved    Anxiety F41.9   2015 - Present    OCD (obsessive compulsive disorder) F42.9   2016 - Present    Dysthymia F34.1   2017 - Present      Health Maintenance        Date Due Completion Dates    IMM HEP B VACCINE (1 of 3 - Primary Series) 1990 ---    IMM HEP A VACCINE (1 of 2 - Standard Series) 1991 ---    IMM HPV VACCINE (1 of 3 - Male 3 Dose Series) 2001 ---    IMM VARICELLA (CHICKENPOX) VACCINE (1 of 2 - 2 Dose Adolescent Series) 2003 ---    IMM DTaP/Tdap/Td Vaccine (3 - Td) 3/24/2027 3/24/2017, 2007            Current Immunizations     Influenza Vaccine Quad Inj (Pf) 2017  4:22 PM    Influenza Vaccine Quad Inj (Preserved) 10/10/2015    Tdap Vaccine 3/24/2017  8:23 AM, 2007      Below and/or attached are the medications your provider expects you to take. Review all of your home medications and newly ordered medications with your provider and/or " pharmacist. Follow medication instructions as directed by your provider and/or pharmacist. Please keep your medication list with you and share with your provider. Update the information when medications are discontinued, doses are changed, or new medications (including over-the-counter products) are added; and carry medication information at all times in the event of emergency situations     Allergies:  No Known Allergies          Medications  Valid as of: May 11, 2017 -  2:28 PM    Generic Name Brand Name Tablet Size Instructions for use    ALPRAZolam (Tab) XANAX 0.5 MG TAKE ONE TABLET BY MOUTH ONCE DAILY AT BEDTIME AS NEEDED FOR ANXIETY        Doxycycline Hyclate (Tab) VIBRAMYCIN 100 MG Take 1 Tab by mouth 2 times a day for 7 days.        FLUoxetine HCl (Cap) PROZAC 40 MG Take 2 Caps by mouth every day.        HydrOXYzine HCl (Tab) ATARAX 25 MG Take one tablet up to three times a day as needed for itch        Multiple Vitamins-Minerals (Tab) THERAGRAN-M  Take 1 Tab by mouth every day.        Triamcinolone Acetonide (Lotion) Triamcinolone Acetonide 0.025 % Apply sparingly to affected area twice daily for seven days        .                 Medicines prescribed today were sent to:     [facility] [address] [place] [address] - [address] [facility]    [address] [place] [address] [place] [address]    Phone: [phone] Fax: [phone]    Open 24 Hours?: No      Medication refill instructions:       If your prescription bottle indicates you have medication refills left, it is not necessary to call your provider’s office. Please contact your pharmacy and they will refill your medication.    If your prescription bottle indicates you do not have any refills left, you may request refills at any time through one of the following ways: The online amcure system (except Urgent Care), by calling your provider’s office, or by asking your pharmacy to contact your provider’s office with a refill request. Medication refills are processed only during regular  business hours and may not be available until the next business day. Your provider may request additional information or to have a follow-up visit with you prior to refilling your medication.   *Please Note: Medication refills are assigned a new Rx number when refilled electronically. Your pharmacy may indicate that no refills were authorized even though a new prescription for the same medication is available at the pharmacy. Please request the medicine by name with the pharmacy before contacting your provider for a refill.           earthmine Access Code: Activation code not generated  Current earthmine Status: Active

## 2017-06-01 ENCOUNTER — HOSPITAL ENCOUNTER (OUTPATIENT)
Dept: LAB | Facility: MEDICAL CENTER | Age: 27
End: 2017-06-01
Attending: INTERNAL MEDICINE
Payer: COMMERCIAL

## 2017-06-01 DIAGNOSIS — Z11.3 SCREEN FOR STD (SEXUALLY TRANSMITTED DISEASE): ICD-10-CM

## 2017-06-01 LAB — HIV 1+2 AB+HIV1 P24 AG SERPL QL IA: NON REACTIVE

## 2017-06-01 PROCEDURE — 87389 HIV-1 AG W/HIV-1&-2 AB AG IA: CPT

## 2017-06-01 PROCEDURE — 36415 COLL VENOUS BLD VENIPUNCTURE: CPT

## 2017-06-08 ENCOUNTER — APPOINTMENT (OUTPATIENT)
Dept: BEHAVIORAL HEALTH | Facility: PHYSICIAN GROUP | Age: 27
End: 2017-06-08
Payer: COMMERCIAL

## 2017-08-09 ENCOUNTER — OFFICE VISIT (OUTPATIENT)
Dept: MEDICAL GROUP | Facility: MEDICAL CENTER | Age: 27
End: 2017-08-09
Payer: COMMERCIAL

## 2017-08-09 ENCOUNTER — HOSPITAL ENCOUNTER (OUTPATIENT)
Dept: LAB | Facility: MEDICAL CENTER | Age: 27
End: 2017-08-09
Attending: INTERNAL MEDICINE
Payer: COMMERCIAL

## 2017-08-09 VITALS
SYSTOLIC BLOOD PRESSURE: 124 MMHG | OXYGEN SATURATION: 98 % | BODY MASS INDEX: 34.88 KG/M2 | TEMPERATURE: 98.3 F | WEIGHT: 263.2 LBS | HEIGHT: 73 IN | HEART RATE: 74 BPM | RESPIRATION RATE: 16 BRPM | DIASTOLIC BLOOD PRESSURE: 80 MMHG

## 2017-08-09 DIAGNOSIS — E66.09 NON MORBID OBESITY DUE TO EXCESS CALORIES: ICD-10-CM

## 2017-08-09 DIAGNOSIS — Z11.3 SCREEN FOR STD (SEXUALLY TRANSMITTED DISEASE): ICD-10-CM

## 2017-08-09 DIAGNOSIS — F41.9 ANXIETY: ICD-10-CM

## 2017-08-09 DIAGNOSIS — L98.9 SKIN LESION: ICD-10-CM

## 2017-08-09 DIAGNOSIS — Z23 NEED FOR HPV VACCINATION: ICD-10-CM

## 2017-08-09 LAB
ALBUMIN SERPL BCP-MCNC: 4.3 G/DL (ref 3.2–4.9)
ALBUMIN/GLOB SERPL: 1.4 G/DL
ALP SERPL-CCNC: 78 U/L (ref 30–99)
ALT SERPL-CCNC: 41 U/L (ref 2–50)
ANION GAP SERPL CALC-SCNC: 8 MMOL/L (ref 0–11.9)
AST SERPL-CCNC: 27 U/L (ref 12–45)
BILIRUB SERPL-MCNC: 0.6 MG/DL (ref 0.1–1.5)
BUN SERPL-MCNC: 10 MG/DL (ref 8–22)
CALCIUM SERPL-MCNC: 9.7 MG/DL (ref 8.5–10.5)
CHLORIDE SERPL-SCNC: 104 MMOL/L (ref 96–112)
CO2 SERPL-SCNC: 25 MMOL/L (ref 20–33)
CREAT SERPL-MCNC: 0.82 MG/DL (ref 0.5–1.4)
GFR SERPL CREATININE-BSD FRML MDRD: >60 ML/MIN/1.73 M 2
GLOBULIN SER CALC-MCNC: 3.1 G/DL (ref 1.9–3.5)
GLUCOSE SERPL-MCNC: 85 MG/DL (ref 65–99)
HIV 1+2 AB+HIV1 P24 AG SERPL QL IA: NON REACTIVE
POTASSIUM SERPL-SCNC: 3.8 MMOL/L (ref 3.6–5.5)
PROT SERPL-MCNC: 7.4 G/DL (ref 6–8.2)
SODIUM SERPL-SCNC: 137 MMOL/L (ref 135–145)
TREPONEMA PALLIDUM IGG+IGM AB [PRESENCE] IN SERUM OR PLASMA BY IMMUNOASSAY: NON REACTIVE

## 2017-08-09 PROCEDURE — 80053 COMPREHEN METABOLIC PANEL: CPT

## 2017-08-09 PROCEDURE — 87491 CHLMYD TRACH DNA AMP PROBE: CPT

## 2017-08-09 PROCEDURE — 90651 9VHPV VACCINE 2/3 DOSE IM: CPT | Performed by: INTERNAL MEDICINE

## 2017-08-09 PROCEDURE — 90471 IMMUNIZATION ADMIN: CPT | Performed by: INTERNAL MEDICINE

## 2017-08-09 PROCEDURE — 86780 TREPONEMA PALLIDUM: CPT

## 2017-08-09 PROCEDURE — 99214 OFFICE O/P EST MOD 30 MIN: CPT | Mod: 25 | Performed by: INTERNAL MEDICINE

## 2017-08-09 PROCEDURE — 87591 N.GONORRHOEAE DNA AMP PROB: CPT

## 2017-08-09 PROCEDURE — 36415 COLL VENOUS BLD VENIPUNCTURE: CPT

## 2017-08-09 PROCEDURE — 87389 HIV-1 AG W/HIV-1&-2 AB AG IA: CPT

## 2017-08-09 NOTE — MR AVS SNAPSHOT
"        Justice Infante   2017 3:40 PM   Office Visit   MRN: [id number]    Department:  [address] [place] [address]   Dept Phone:  [phone]    Description:  Male : 1990   Provider:  Demar Dobson M.D.           Reason for Visit     Nevus on back    Immunizations start HPV series    Orders Needed lab work order for HIV      Allergies as of 2017     No Known Allergies      You were diagnosed with     Need for HPV vaccination   [297286]       Screen for STD (sexually transmitted disease)   [242060]       Anxiety   [560466]       Non morbid obesity due to excess calories   [8067127]       Skin lesion   [411533]         Vital Signs     Blood Pressure Pulse Temperature Respirations Height Weight    124/80 mmHg 74 36.8 °C (98.3 °F) 16 1.854 m (6' 0.99\") 119.387 kg (263 lb 3.2 oz)    Body Mass Index Oxygen Saturation Smoking Status             34.73 kg/m2 98% Former Smoker         Basic Information     Date Of Birth Sex Race Ethnicity Preferred Language    1990 Male White Non- English      Problem List              ICD-10-CM Priority Class Noted - Resolved    Anxiety F41.9   2015 - Present    OCD (obsessive compulsive disorder) F42.9   2016 - Present    Dysthymia F34.1   2017 - Present      Health Maintenance        Date Due Completion Dates    IMM HEP B VACCINE (1 of 3 - Primary Series) 1990 ---    IMM HEP A VACCINE (1 of 2 - Standard Series) 1991 ---    IMM VARICELLA (CHICKENPOX) VACCINE (1 of 2 - 2 Dose Adolescent Series) 2003 ---    IMM INFLUENZA (1) 2017, 10/10/2015    IMM DTaP/Tdap/Td Vaccine (3 - Td) 3/24/2027 3/24/2017, 2007            Current Immunizations     HPV 9-VALENT VACCINE (GARDASIL 9)  Incomplete    Influenza Vaccine Quad Inj (Pf) 2017  4:22 PM    Influenza Vaccine Quad Inj (Preserved) 10/10/2015    Tdap Vaccine 3/24/2017  8:23 AM, 2007      Below and/or attached are the medications your provider expects you to " take. Review all of your home medications and newly ordered medications with your provider and/or pharmacist. Follow medication instructions as directed by your provider and/or pharmacist. Please keep your medication list with you and share with your provider. Update the information when medications are discontinued, doses are changed, or new medications (including over-the-counter products) are added; and carry medication information at all times in the event of emergency situations     Allergies:  No Known Allergies          Medications  Valid as of: August 09, 2017 -  4:21 PM    Generic Name Brand Name Tablet Size Instructions for use    ALPRAZolam (Tab) XANAX 0.5 MG TAKE ONE TABLET BY MOUTH ONCE DAILY AT BEDTIME AS NEEDED FOR ANXIETY        Emtricitabine-Tenofovir DF (Tab) TRUVADA 200-300 MG Take 1 Tab by mouth every day.        FLUoxetine HCl (Cap) PROZAC 40 MG Take 2 Caps by mouth every day.        HydrOXYzine HCl (Tab) ATARAX 25 MG Take one tablet up to three times a day as needed for itch        Multiple Vitamins-Minerals (Tab) THERAGRAN-M  Take 1 Tab by mouth every day.        Triamcinolone Acetonide (Lotion) Triamcinolone Acetonide 0.025 % Apply sparingly to affected area twice daily for seven days        .                 Medicines prescribed today were sent to:     [facility] [address] [place] [address] - [address] [facility]    [address] [place] [address]    Phone: [phone] Fax: [phone]    Open 24 Hours?: No      Medication refill instructions:       If your prescription bottle indicates you have medication refills left, it is not necessary to call your provider’s office. Please contact your pharmacy and they will refill your medication.    If your prescription bottle indicates you do not have any refills left, you may request refills at any time through one of the following ways: The online Just Sing It system (except Urgent Care), by calling your provider’s office, or by asking your pharmacy to contact your  provider’s office with a refill request. Medication refills are processed only during regular business hours and may not be available until the next business day. Your provider may request additional information or to have a follow-up visit with you prior to refilling your medication.   *Please Note: Medication refills are assigned a new Rx number when refilled electronically. Your pharmacy may indicate that no refills were authorized even though a new prescription for the same medication is available at the pharmacy. Please request the medicine by name with the pharmacy before contacting your provider for a refill.        Your To Do List     Future Labs/Procedures Complete By Expires    CHLAMYDIA/GC PCR URINE OR SWAB  As directed 8/10/2018    COMP METABOLIC PANEL  As directed 8/10/2018    HIV ANTIBODIES  As directed 8/9/2018         [employer] Access Code: Activation code not generated  Current Modelinia Status: Active

## 2017-08-09 NOTE — PROGRESS NOTES
CC: Follow-up multiple issues    HPI:   Justice presents today with the following.    1. Need for HPV vaccination  Requesting vaccination.    2. Screen for STD (sexually transmitted disease)  He is in a relationship with HIV positive partner. He was placed on medications for prophylaxis but is due for testing as well as CMP. He denies any nausea vomiting no abdominal pains or jaundice.    3. Anxiety  Anxiety doing fairly well on average no longer seeing psychiatry but seeing psychologist regularly. He is still taking Xanax on a rare occasion forced Prozac has been helpful.    4. Non morbid obesity due to excess calories  Weight is gone up slightly since last visit starting on medications. .    5. Skin lesion  Does have a skin lesion on his back that his sister wanted looked at. Unclear how long it's been present nor change in characteristic        Patient Active Problem List    Diagnosis Date Noted   • Dysthymia 02/17/2017   • OCD (obsessive compulsive disorder) 07/13/2016   • Anxiety 06/05/2015       Current Outpatient Prescriptions   Medication Sig Dispense Refill   • emtricitabine-tenofovir (TRUVADA) 200-300 MG per tablet Take 1 Tab by mouth every day. 30 Tab 6   • fluoxetine (PROZAC) 40 MG capsule Take 2 Caps by mouth every day. 180 Cap 3   • alprazolam (XANAX) 0.5 MG Tab TAKE ONE TABLET BY MOUTH ONCE DAILY AT BEDTIME AS NEEDED FOR ANXIETY 15 Tab 1   • therapeutic multivitamin-minerals (THERAGRAN-M) Tab Take 1 Tab by mouth every day.     • Triamcinolone Acetonide 0.025 % Lotion Apply sparingly to affected area twice daily for seven days (Patient not taking: Reported on 8/9/2017) 1 Bottle 0   • hydrOXYzine (ATARAX) 25 MG Tab Take one tablet up to three times a day as needed for itch (Patient not taking: Reported on 8/9/2017) 30 Tab 0     No current facility-administered medications for this visit.         Allergies as of 08/09/2017   • (No Known Allergies)        ROS: As per HPI.    /80 mmHg  Pulse 74   "Temp(Src) 36.8 °C (98.3 °F)  Resp 16  Ht 1.854 m (6' 0.99\")  Wt 119.387 kg (263 lb 3.2 oz)  BMI 34.73 kg/m2  SpO2 98%    Physical Exam:  Gen:         Alert and oriented, No apparent distress.  Neck:        No Lymphadenopathy or Bruits.  Lungs:     Clear to auscultation bilaterally  CV:          Regular rate and rhythm. No murmurs, rubs or gallops.               Ext:          No clubbing, cyanosis, edema.      Assessment and Plan.   27 y.o. male with the following issues.    1. Need for HPV vaccination  Given today  - GARDASIL 9    2. Screen for STD (sexually transmitted disease)  Continue medications checking liver function as well as SCDs.  - CHLAMYDIA/GC PCR URINE OR SWAB; Future  - HIV ANTIBODIES; Future  - RPR TITER+TP-PA REFLEX    3. Anxiety  Following psychology no change to medications.  - COMP METABOLIC PANEL; Future    4. Non morbid obesity due to excess calories  Discussed diet exercise and weight loss strategies. Have set a goal for 15 pounds in 3 months and will followup at that time.    5. Skin lesion  Multiple holes all uniform in color no major concerning characteristics          "

## 2017-08-10 LAB
C TRACH DNA SPEC QL NAA+PROBE: NEGATIVE
N GONORRHOEA DNA SPEC QL NAA+PROBE: NEGATIVE
SPECIMEN SOURCE: NORMAL

## 2017-08-14 ENCOUNTER — OFFICE VISIT (OUTPATIENT)
Dept: URGENT CARE | Facility: PHYSICIAN GROUP | Age: 27
End: 2017-08-14
Payer: COMMERCIAL

## 2017-08-14 VITALS
TEMPERATURE: 97.6 F | OXYGEN SATURATION: 95 % | HEART RATE: 62 BPM | DIASTOLIC BLOOD PRESSURE: 88 MMHG | SYSTOLIC BLOOD PRESSURE: 122 MMHG | BODY MASS INDEX: 31.17 KG/M2 | WEIGHT: 256 LBS | HEIGHT: 76 IN

## 2017-08-14 DIAGNOSIS — R09.82 POST-NASAL DRIP: ICD-10-CM

## 2017-08-14 DIAGNOSIS — J02.9 SORE THROAT: ICD-10-CM

## 2017-08-14 DIAGNOSIS — R09.81 CONGESTION OF NASAL SINUS: ICD-10-CM

## 2017-08-14 LAB
INT CON NEG: NEGATIVE
INT CON POS: POSITIVE
S PYO AG THROAT QL: NEGATIVE

## 2017-08-14 PROCEDURE — 87880 STREP A ASSAY W/OPTIC: CPT | Performed by: PHYSICIAN ASSISTANT

## 2017-08-14 PROCEDURE — 99214 OFFICE O/P EST MOD 30 MIN: CPT | Performed by: PHYSICIAN ASSISTANT

## 2017-08-14 ASSESSMENT — ENCOUNTER SYMPTOMS
EYE DISCHARGE: 0
WHEEZING: 0
SHORTNESS OF BREATH: 0
EYE REDNESS: 0
TINGLING: 0
VOMITING: 0
FEVER: 0
DIZZINESS: 0
COUGH: 0
ABDOMINAL PAIN: 0
SORE THROAT: 1
DIARRHEA: 0
HEADACHES: 0
MYALGIAS: 1
NECK PAIN: 0
CHILLS: 0
TROUBLE SWALLOWING: 0
SWOLLEN GLANDS: 0

## 2017-08-14 NOTE — Clinical Note
August 14, 2017         Patient: Justice Infante   YOB: 1990   Date of Visit: 8/14/2017           To Whom it May Concern:    Justice Infante was seen in my clinic on 8/14/2017. Please excuse this patient form work tomorrow due to recent illness, may return if symptoms have improved.     If you have any questions or concerns, please don't hesitate to call.        Sincerely,           Javier Holden PA-C  Electronically Signed

## 2017-08-15 NOTE — PROGRESS NOTES
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 The current episode started today. The problem has been gradually worsening. Neither side of throat is experiencing more pain than the other. There has been no fever. The pain is at a severity of 2/10. The pain is mild. Associated symptoms include congestion. Pertinent negatives include no abdominal pain, coughing, diarrhea, ear discharge, headaches, neck pain, shortness of breath, swollen glands, trouble swallowing or vomiting. He has had no exposure to strep or mono. Treatments tried: Cough drops.       Review of Systems   Constitutional: Positive for malaise/fatigue. Negative for fever and chills.   HENT: Positive for congestion and sore throat. Negative for ear discharge and trouble swallowing.    Eyes: Negative for discharge and redness.   Respiratory: Negative for cough, shortness of breath and wheezing.    Cardiovascular: Negative for chest pain and leg swelling.   Gastrointestinal: Negative for vomiting, abdominal pain and diarrhea.   Genitourinary: Negative for dysuria and urgency.   Musculoskeletal: Positive for myalgias. Negative for neck pain.   Skin: Negative for itching and rash.   Neurological: Negative for dizziness, tingling and headaches.          Objective:     /88 mmHg  Pulse 62  Temp(Src) 36.4 °C (97.6 °F)  Ht 1.93 m (6' 3.98\")  Wt 116.121 kg (256 lb)  BMI 31.17 kg/m2  SpO2 95%   PMH:  has a past medical history of OCD (obsessive compulsive disorder); Anxiety; and Migraine.  MEDS:   Current outpatient prescriptions:   •  emtricitabine-tenofovir (TRUVADA) 200-300 MG per tablet, Take 1 Tab by mouth every day., Disp: 30 Tab, Rfl: 6  •  hydrOXYzine (ATARAX) 25 MG Tab, Take one tablet up to three times a day as needed for itch, Disp: 30 Tab, Rfl: 0  •  fluoxetine (PROZAC) 40 MG capsule, Take 2 Caps by mouth every day., Disp: 180 Cap, Rfl: 3  •  " alprazolam (XANAX) 0.5 MG Tab, TAKE ONE TABLET BY MOUTH ONCE DAILY AT BEDTIME AS NEEDED FOR ANXIETY, Disp: 15 Tab, Rfl: 1  •  therapeutic multivitamin-minerals (THERAGRAN-M) Tab, Take 1 Tab by mouth every day., Disp: , Rfl:   •  Triamcinolone Acetonide 0.025 % Lotion, Apply sparingly to affected area twice daily for seven days (Patient not taking: Reported on 8/9/2017), Disp: 1 Bottle, Rfl: 0  ALLERGIES: No Known Allergies  SURGHX:   Past Surgical History   Procedure Laterality Date   • Appendectomy  2012     SOCHX:  reports that he quit smoking about 2 years ago. His smoking use included Cigarettes. He smoked 0.25 packs per day. He has never used smokeless tobacco. He reports that he does not drink alcohol or use illicit drugs.  FH: Family history was reviewed, no pertinent findings to report    Physical Exam   Constitutional: He is oriented to person, place, and time. He appears well-developed and well-nourished.   HENT:   Head: Normocephalic and atraumatic.   Right Ear: External ear normal.   Left Ear: External ear normal.   Nose: Nose normal.   Mouth/Throat: No oropharyngeal exudate.   Posterior oropharynx with tonsillar erythema and noted cobblestoning with PND.  Without evidence of abscess formation.    Eyes: EOM are normal. Pupils are equal, round, and reactive to light.   Neck: Normal range of motion. Neck supple. No thyromegaly present.   Cardiovascular: Normal rate and regular rhythm.    Pulmonary/Chest: Effort normal and breath sounds normal. No respiratory distress.   Musculoskeletal: Normal range of motion. He exhibits no edema.   Lymphadenopathy:     He has no cervical adenopathy.   Neurological: He is alert and oriented to person, place, and time.   Skin: Skin is warm. No rash noted. No pallor.   Psychiatric: He has a normal mood and affect. His behavior is normal.   Vitals reviewed.            Strep negative.     Assessment/Plan:     1. Sore throat  - POCT Rapid Strep A  2. PND  3. Nasal  congestion    Discussed most likely viral etiology of symptoms today. Magic mouthwash was written for symptomatic relief of symptoms. Increase fluids, avoid night time dairy. Humidification. Trial Claritin or Flonase.   Patient given precautionary s/sx that mandate immediate follow up and evaluation in the ED. Advised of risks of not doing so.    DDX, Supportive care, and indications for immediate follow-up discussed with patient.    Instructed to return to clinic or nearest emergency department if we are not available for any change in condition, further concerns, or worsening of symptoms.    The patient demonstrated a good understanding and agreed with the treatment plan.

## 2017-09-15 ENCOUNTER — APPOINTMENT (OUTPATIENT)
Dept: MEDICAL GROUP | Facility: MEDICAL CENTER | Age: 27
End: 2017-09-15
Payer: COMMERCIAL

## 2017-09-20 ENCOUNTER — APPOINTMENT (OUTPATIENT)
Dept: MEDICAL GROUP | Facility: MEDICAL CENTER | Age: 27
End: 2017-09-20
Payer: COMMERCIAL

## 2017-09-21 ENCOUNTER — NON-PROVIDER VISIT (OUTPATIENT)
Dept: MEDICAL GROUP | Facility: MEDICAL CENTER | Age: 27
End: 2017-09-21
Payer: COMMERCIAL

## 2017-09-21 DIAGNOSIS — Z23 NEEDS FLU SHOT: ICD-10-CM

## 2017-09-21 PROCEDURE — 90471 IMMUNIZATION ADMIN: CPT | Performed by: INTERNAL MEDICINE

## 2017-09-21 PROCEDURE — 90686 IIV4 VACC NO PRSV 0.5 ML IM: CPT | Performed by: INTERNAL MEDICINE

## 2017-09-21 PROCEDURE — 90651 9VHPV VACCINE 2/3 DOSE IM: CPT | Performed by: INTERNAL MEDICINE

## 2017-09-21 PROCEDURE — 90472 IMMUNIZATION ADMIN EACH ADD: CPT | Performed by: INTERNAL MEDICINE

## 2017-10-12 ENCOUNTER — OFFICE VISIT (OUTPATIENT)
Dept: MEDICAL GROUP | Facility: MEDICAL CENTER | Age: 27
End: 2017-10-12
Payer: COMMERCIAL

## 2017-10-12 VITALS
HEART RATE: 63 BPM | SYSTOLIC BLOOD PRESSURE: 118 MMHG | BODY MASS INDEX: 32.47 KG/M2 | WEIGHT: 266.6 LBS | TEMPERATURE: 97.9 F | RESPIRATION RATE: 16 BRPM | DIASTOLIC BLOOD PRESSURE: 80 MMHG | HEIGHT: 76 IN | OXYGEN SATURATION: 96 %

## 2017-10-12 DIAGNOSIS — F41.9 ANXIETY: ICD-10-CM

## 2017-10-12 DIAGNOSIS — E66.9 OBESITY (BMI 30.0-34.9): ICD-10-CM

## 2017-10-12 DIAGNOSIS — F42.9 OBSESSIVE-COMPULSIVE DISORDER, UNSPECIFIED TYPE: ICD-10-CM

## 2017-10-12 PROCEDURE — 99214 OFFICE O/P EST MOD 30 MIN: CPT | Performed by: INTERNAL MEDICINE

## 2017-10-12 RX ORDER — BUSPIRONE HYDROCHLORIDE 10 MG/1
10 TABLET ORAL 2 TIMES DAILY
Qty: 60 TAB | Refills: 6 | Status: SHIPPED | OUTPATIENT
Start: 2017-10-12 | End: 2018-08-29 | Stop reason: SDUPTHER

## 2017-10-12 NOTE — PROGRESS NOTES
"CC: Follow-up multiple issues    HPI:   Justice presents today with the following.    1. Obesity (BMI 30.0-34.9)  Weight has gone up since last visit he does report food is a coping mechanism for him. He is getting activity. Denies any chest pain or shortness of breath no edema.    2. Obsessive-compulsive disorder, unspecified type  Reports current medication is quite helpful with his OCD and will like to continue the medication. No major depressive symptoms.    3. Anxiety  He does report persistent anxiety. He does take Xanax on occasion. He reports more frequent symptoms then he takes Xanax for. He would like to switch medications or play something on additionally to see if we can help.      Patient Active Problem List    Diagnosis Date Noted   • Obesity (BMI 30.0-34.9) 10/12/2017   • Dysthymia 02/17/2017   • OCD (obsessive compulsive disorder) 07/13/2016   • Anxiety 06/05/2015       Current Outpatient Prescriptions   Medication Sig Dispense Refill   • busPIRone (BUSPAR) 10 MG Tab Take 1 Tab by mouth 2 times a day. 60 Tab 6   • mag hydrox-al hydrox-simeth-diphenhydrAMINE-lidocaine viscous 2% Swish, gargle, and spit, one to two teaspoonfuls every six hours as needed. Shake well before using. 120 mL 0   • emtricitabine-tenofovir (TRUVADA) 200-300 MG per tablet Take 1 Tab by mouth every day. 30 Tab 6   • Triamcinolone Acetonide 0.025 % Lotion Apply sparingly to affected area twice daily for seven days 1 Bottle 0   • fluoxetine (PROZAC) 40 MG capsule Take 2 Caps by mouth every day. 180 Cap 3   • alprazolam (XANAX) 0.5 MG Tab TAKE ONE TABLET BY MOUTH ONCE DAILY AT BEDTIME AS NEEDED FOR ANXIETY 15 Tab 1   • therapeutic multivitamin-minerals (THERAGRAN-M) Tab Take 1 Tab by mouth every day.       No current facility-administered medications for this visit.          Allergies as of 10/12/2017   • (No Known Allergies)        ROS: As per HPI.    /80   Pulse 63   Temp 36.6 °C (97.9 °F)   Resp 16   Ht 1.93 m (6' 4\")  "  Wt 120.9 kg (266 lb 9.6 oz)   SpO2 96%   BMI 32.45 kg/m²     Physical Exam:  Gen:         Alert and oriented, No apparent distress.  Neck:        No Lymphadenopathy or Bruits.  Lungs:     Clear to auscultation bilaterally  CV:          Regular rate and rhythm. No murmurs, rubs or gallops.               Ext:          No clubbing, cyanosis, edema.      Assessment and Plan.   27 y.o. male with the following issues.    1. Obesity (BMI 30.0-34.9)  Patient's body mass index is 32.45 kg/m². Exercise and nutrition counseling were performed at this visit.  Set goal of 15lbs in next 3 months.    - Patient identified as having weight management issue.  Appropriate orders and counseling given.    2. Obsessive-compulsive disorder, unspecified type  Continue Prozac.    3. Anxiety  Have added medication below caution about side effects he will follow-up if not improving.  - busPIRone (BUSPAR) 10 MG Tab; Take 1 Tab by mouth 2 times a day.  Dispense: 60 Tab; Refill: 6

## 2017-10-31 ENCOUNTER — HOSPITAL ENCOUNTER (OUTPATIENT)
Facility: MEDICAL CENTER | Age: 27
End: 2017-10-31
Attending: PHYSICIAN ASSISTANT
Payer: COMMERCIAL

## 2017-10-31 ENCOUNTER — OFFICE VISIT (OUTPATIENT)
Dept: URGENT CARE | Facility: CLINIC | Age: 27
End: 2017-10-31
Payer: COMMERCIAL

## 2017-10-31 VITALS
HEART RATE: 75 BPM | TEMPERATURE: 97.5 F | DIASTOLIC BLOOD PRESSURE: 80 MMHG | OXYGEN SATURATION: 98 % | BODY MASS INDEX: 34.72 KG/M2 | SYSTOLIC BLOOD PRESSURE: 120 MMHG | WEIGHT: 262 LBS | RESPIRATION RATE: 20 BRPM | HEIGHT: 73 IN

## 2017-10-31 DIAGNOSIS — R09.82 PND (POST-NASAL DRIP): ICD-10-CM

## 2017-10-31 DIAGNOSIS — J02.9 SORE THROAT: ICD-10-CM

## 2017-10-31 LAB
INT CON NEG: NEGATIVE
INT CON POS: POSITIVE
S PYO AG THROAT QL: NEGATIVE

## 2017-10-31 PROCEDURE — 99214 OFFICE O/P EST MOD 30 MIN: CPT | Performed by: PHYSICIAN ASSISTANT

## 2017-10-31 PROCEDURE — 87070 CULTURE OTHR SPECIMN AEROBIC: CPT

## 2017-10-31 PROCEDURE — 87880 STREP A ASSAY W/OPTIC: CPT | Performed by: PHYSICIAN ASSISTANT

## 2017-10-31 RX ORDER — CODEINE PHOSPHATE AND GUAIFENESIN 10; 100 MG/5ML; MG/5ML
5 SOLUTION ORAL NIGHTLY PRN
Qty: 100 ML | Refills: 0 | Status: SHIPPED | OUTPATIENT
Start: 2017-10-31 | End: 2018-03-07

## 2017-11-01 DIAGNOSIS — J02.9 SORE THROAT: ICD-10-CM

## 2017-11-03 LAB
BACTERIA SPEC RESP CULT: NORMAL
SIGNIFICANT IND 70042: NORMAL
SOURCE SOURCE: NORMAL

## 2017-11-03 ASSESSMENT — ENCOUNTER SYMPTOMS
EYE DISCHARGE: 0
MYALGIAS: 0
HOARSE VOICE: 1
SORE THROAT: 1
COUGH: 0
ABDOMINAL PAIN: 0
NECK PAIN: 0
VOMITING: 0
SHORTNESS OF BREATH: 0
CHILLS: 0
WHEEZING: 0
SWOLLEN GLANDS: 0
TROUBLE SWALLOWING: 0
FEVER: 0
EYE REDNESS: 0
HEADACHES: 0
DIARRHEA: 0

## 2017-11-03 NOTE — PROGRESS NOTES
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 The current episode started today. The problem has been unchanged. There has been no fever. The pain is at a severity of 4/10. The pain is moderate. Associated symptoms include a hoarse voice. Pertinent negatives include no abdominal pain, congestion, coughing, diarrhea, ear discharge, ear pain, headaches, plugged ear sensation, neck pain, shortness of breath, swollen glands, trouble swallowing or vomiting. He has had no exposure to strep or mono. He has tried cool liquids for the symptoms.       Review of Systems   Constitutional: Positive for malaise/fatigue. Negative for chills and fever.   HENT: Positive for hoarse voice and sore throat. Negative for congestion, ear discharge, ear pain and trouble swallowing.         Pos. For ear pressure     Eyes: Negative for discharge and redness.   Respiratory: Negative for cough, shortness of breath and wheezing.    Cardiovascular: Negative for chest pain and leg swelling.   Gastrointestinal: Negative for abdominal pain, diarrhea and vomiting.   Genitourinary: Negative for dysuria and urgency.   Musculoskeletal: Negative for myalgias and neck pain.   Skin: Negative for itching and rash.   Neurological: Negative for headaches.          Objective:     /80   Pulse 75   Temp 36.4 °C (97.5 °F)   Resp 20   Ht 1.854 m (6' 1\")   Wt 118.8 kg (262 lb)   SpO2 98%   BMI 34.57 kg/m²    PMH:  has a past medical history of Anxiety; Migraine; and OCD (obsessive compulsive disorder).  MEDS:   Current Outpatient Prescriptions:   •  guaifenesin-codeine (ROBITUSSIN AC) Solution oral solution, Take 5 mL by mouth at bedtime as needed for Cough (May cause sedation)., Disp: 100 mL, Rfl: 0  •  busPIRone (BUSPAR) 10 MG Tab, Take 1 Tab by mouth 2 times a day., Disp: 60 Tab, Rfl: 6  •  emtricitabine-tenofovir (TRUVADA) 200-300 MG per tablet, Take 1 " Tab by mouth every day., Disp: 30 Tab, Rfl: 6  •  fluoxetine (PROZAC) 40 MG capsule, Take 2 Caps by mouth every day., Disp: 180 Cap, Rfl: 3  •  mag hydrox-al hydrox-simeth-diphenhydrAMINE-lidocaine viscous 2%, Swish, gargle, and spit, one to two teaspoonfuls every six hours as needed. Shake well before using., Disp: 120 mL, Rfl: 0  •  Triamcinolone Acetonide 0.025 % Lotion, Apply sparingly to affected area twice daily for seven days, Disp: 1 Bottle, Rfl: 0  •  alprazolam (XANAX) 0.5 MG Tab, TAKE ONE TABLET BY MOUTH ONCE DAILY AT BEDTIME AS NEEDED FOR ANXIETY, Disp: 15 Tab, Rfl: 1  •  therapeutic multivitamin-minerals (THERAGRAN-M) Tab, Take 1 Tab by mouth every day., Disp: , Rfl:   ALLERGIES: No Known Allergies  SURGHX:   Past Surgical History:   Procedure Laterality Date   • APPENDECTOMY  2012     SOCHX:  reports that he quit smoking about 2 years ago. His smoking use included Cigarettes. He smoked 0.25 packs per day. He has never used smokeless tobacco. He reports that he does not drink alcohol or use drugs.  FH: Family history was reviewed, no pertinent findings to report    Physical Exam   Constitutional: He is oriented to person, place, and time. He appears well-developed and well-nourished.   HENT:   Head: Normocephalic and atraumatic.   Mouth/Throat: No oropharyngeal exudate.   Ears- Canals clear- TM- with clear fluid effusions bilaterally.   Pos. PND, with slight erythema- without tonsillar edema or exudate.   Mild discharge noted bilaterally- to nares.      Eyes: EOM are normal. Pupils are equal, round, and reactive to light.   Neck: Normal range of motion. Neck supple.   Cardiovascular: Normal rate and regular rhythm.    No murmur heard.  Pulmonary/Chest: Effort normal and breath sounds normal. No respiratory distress.   Musculoskeletal: Normal range of motion. He exhibits no tenderness.   Lymphadenopathy:     He has no cervical adenopathy.   Neurological: He is alert and oriented to person, place, and  time.   Skin: Skin is warm. No rash noted.   Psychiatric: He has a normal mood and affect. His behavior is normal.   Vitals reviewed.         POC strep-negative.   Assessment/Plan:     1. Sore throat  - POCT Rapid Strep A  - guaifenesin-codeine (ROBITUSSIN AC) Solution oral solution; Take 5 mL by mouth at bedtime as needed for Cough (May cause sedation).  Dispense: 100 mL; Refill: 0  - CULTURE THROAT; Future    2. PND (post-nasal drip)    NARXCHECK was reviewed by myself-  Document does not reveal any concerning patterns. Pt. was advised to avoid the operation of heavy machine along with driving while on such medications. Finally pt. was advised to use medication only as prescribed.   At this time patient has been evaluated several times for sore throat in the last few years- will send off for throat culture to ensure normal resp. Shalonda is causing symptoms. Pt. Agreeable. Will treat symptomatically at this time.   Patient given precautionary s/sx that mandate immediate follow up and evaluation in the ED. Advised of risks of not doing so.    DDX, Supportive care, and indications for immediate follow-up discussed with patient.    Instructed to return to clinic or nearest emergency department if we are not available for any change in condition, further concerns, or worsening of symptoms.    The patient demonstrated a good understanding and agreed with the treatment plan.

## 2017-12-14 ENCOUNTER — HOSPITAL ENCOUNTER (OUTPATIENT)
Dept: LAB | Facility: MEDICAL CENTER | Age: 27
End: 2017-12-14
Attending: INTERNAL MEDICINE
Payer: COMMERCIAL

## 2017-12-14 DIAGNOSIS — Z01.84 IMMUNITY STATUS TESTING: ICD-10-CM

## 2017-12-14 PROCEDURE — 86787 VARICELLA-ZOSTER ANTIBODY: CPT

## 2017-12-14 PROCEDURE — 36415 COLL VENOUS BLD VENIPUNCTURE: CPT

## 2017-12-15 LAB — VZV IGG SER IA-ACNC: POSITIVE

## 2018-01-05 RX ORDER — DEXAMETHASONE 0.75 MG/1
TABLET ORAL
Qty: 30 TAB | Refills: 6 | Status: SHIPPED | OUTPATIENT
Start: 2018-01-05 | End: 2018-08-16 | Stop reason: SDUPTHER

## 2018-02-22 DIAGNOSIS — F41.9 ANXIETY: ICD-10-CM

## 2018-02-22 RX ORDER — FLUOXETINE HYDROCHLORIDE 40 MG/1
80 CAPSULE ORAL DAILY
Qty: 180 CAP | Refills: 3 | Status: SHIPPED | OUTPATIENT
Start: 2018-02-22 | End: 2018-03-07

## 2018-03-07 ENCOUNTER — OFFICE VISIT (OUTPATIENT)
Dept: MEDICAL GROUP | Facility: MEDICAL CENTER | Age: 28
End: 2018-03-07
Payer: COMMERCIAL

## 2018-03-07 ENCOUNTER — HOSPITAL ENCOUNTER (OUTPATIENT)
Dept: LAB | Facility: MEDICAL CENTER | Age: 28
End: 2018-03-07
Attending: INTERNAL MEDICINE
Payer: COMMERCIAL

## 2018-03-07 VITALS
HEIGHT: 73 IN | WEIGHT: 274 LBS | OXYGEN SATURATION: 97 % | DIASTOLIC BLOOD PRESSURE: 74 MMHG | SYSTOLIC BLOOD PRESSURE: 114 MMHG | RESPIRATION RATE: 16 BRPM | HEART RATE: 72 BPM | BODY MASS INDEX: 36.31 KG/M2 | TEMPERATURE: 97.6 F

## 2018-03-07 DIAGNOSIS — Z11.3 SCREEN FOR STD (SEXUALLY TRANSMITTED DISEASE): ICD-10-CM

## 2018-03-07 DIAGNOSIS — F41.9 ANXIETY: ICD-10-CM

## 2018-03-07 DIAGNOSIS — F34.1 DYSTHYMIA: ICD-10-CM

## 2018-03-07 DIAGNOSIS — Z23 NEED FOR VACCINATION: ICD-10-CM

## 2018-03-07 LAB
HIV 1+2 AB+HIV1 P24 AG SERPL QL IA: NON REACTIVE
TREPONEMA PALLIDUM IGG+IGM AB [PRESENCE] IN SERUM OR PLASMA BY IMMUNOASSAY: NON REACTIVE

## 2018-03-07 PROCEDURE — 87389 HIV-1 AG W/HIV-1&-2 AB AG IA: CPT

## 2018-03-07 PROCEDURE — 99214 OFFICE O/P EST MOD 30 MIN: CPT | Mod: 25 | Performed by: INTERNAL MEDICINE

## 2018-03-07 PROCEDURE — 87591 N.GONORRHOEAE DNA AMP PROB: CPT

## 2018-03-07 PROCEDURE — 36415 COLL VENOUS BLD VENIPUNCTURE: CPT

## 2018-03-07 PROCEDURE — 87491 CHLMYD TRACH DNA AMP PROBE: CPT

## 2018-03-07 PROCEDURE — 90651 9VHPV VACCINE 2/3 DOSE IM: CPT | Performed by: INTERNAL MEDICINE

## 2018-03-07 PROCEDURE — 86780 TREPONEMA PALLIDUM: CPT

## 2018-03-07 PROCEDURE — 90471 IMMUNIZATION ADMIN: CPT | Performed by: INTERNAL MEDICINE

## 2018-03-07 RX ORDER — VENLAFAXINE HYDROCHLORIDE 37.5 MG/1
37.5 CAPSULE, EXTENDED RELEASE ORAL 2 TIMES DAILY
Qty: 60 CAP | Refills: 11 | Status: SHIPPED | OUTPATIENT
Start: 2018-03-07 | End: 2018-03-21

## 2018-03-08 NOTE — PROGRESS NOTES
"CC: Follow-up anxiety and depression.    HPI:   Justice presents today with the following.    1. Dysthymia  Presents reporting he still going through therapy and still having depressive type symptoms. He's been seen by psychiatry in the past and is interested in possibly see him again. His been talking to his therapist wondering if he should switch from his current medications to Effexor. He is interested in switch see if he can improve.    2. Anxiety  's anxiety is doing quite well on BuSpar denies any need for change of medication error.        Patient Active Problem List    Diagnosis Date Noted   • Obesity (BMI 30.0-34.9) 10/12/2017   • Dysthymia 02/17/2017   • OCD (obsessive compulsive disorder) 07/13/2016   • Anxiety 06/05/2015       Current Outpatient Prescriptions   Medication Sig Dispense Refill   • venlafaxine XR (EFFEXOR XR) 37.5 MG CAPSULE SR 24 HR Take 1 Cap by mouth 2 Times a Day. 60 Cap 11   • TRUVADA 200-300 MG per tablet TAKE ONE TABLET BY MOUTH ONCE DAILY 30 Tab 6   • busPIRone (BUSPAR) 10 MG Tab Take 1 Tab by mouth 2 times a day. 60 Tab 6     No current facility-administered medications for this visit.          Allergies as of 03/07/2018   • (No Known Allergies)        ROS: Denies Chest pain, SOB, LE edema.    /74   Pulse 72   Temp 36.4 °C (97.6 °F)   Resp 16   Ht 1.854 m (6' 1\")   Wt 124.3 kg (274 lb)   SpO2 97%   BMI 36.15 kg/m²      Physical Exam:  Gen:         Alert and oriented, No apparent distress.  Neck:        No Lymphadenopathy or Bruits.  Lungs:     Clear to auscultation bilaterally  CV:          Regular rate and rhythm. No murmurs, rubs or gallops.               Ext:          No clubbing, cyanosis, edema.      Assessment and Plan.   27 y.o. male with the following issues.    1. Dysthymia  Discussion about a titration scale of coming off of Prozac and on to Effexor. Will see back in one month and titrate medications.  - venlafaxine XR (EFFEXOR XR) 37.5 MG CAPSULE SR 24 HR; " Take 1 Cap by mouth 2 Times a Day.  Dispense: 60 Cap; Refill: 11  - REFERRAL TO PSYCHIATRY    2. Anxiety  Currently doing well continue BuSpar.    3. Need for vaccination    - 9VHPV VACCINE 2-3 DOSE IM    4. Screen for STD (sexually transmitted disease)    - HIV AG/AB COMBO ASSAY SCREENING; Future  - CHLAMYDIA/GC PCR URINE OR SWAB; Future  - RPR TITER+TP-PA REFLEX

## 2018-04-11 ENCOUNTER — APPOINTMENT (OUTPATIENT)
Dept: MEDICAL GROUP | Facility: MEDICAL CENTER | Age: 28
End: 2018-04-11
Payer: COMMERCIAL

## 2018-05-09 ENCOUNTER — OFFICE VISIT (OUTPATIENT)
Dept: MEDICAL GROUP | Facility: MEDICAL CENTER | Age: 28
End: 2018-05-09
Payer: COMMERCIAL

## 2018-05-09 VITALS
RESPIRATION RATE: 16 BRPM | HEART RATE: 73 BPM | TEMPERATURE: 97.6 F | BODY MASS INDEX: 36.71 KG/M2 | HEIGHT: 73 IN | SYSTOLIC BLOOD PRESSURE: 114 MMHG | DIASTOLIC BLOOD PRESSURE: 64 MMHG | OXYGEN SATURATION: 97 % | WEIGHT: 277 LBS

## 2018-05-09 DIAGNOSIS — F41.9 ANXIETY: ICD-10-CM

## 2018-05-09 DIAGNOSIS — E66.9 OBESITY (BMI 30.0-34.9): ICD-10-CM

## 2018-05-09 PROCEDURE — 99213 OFFICE O/P EST LOW 20 MIN: CPT | Performed by: INTERNAL MEDICINE

## 2018-05-10 NOTE — PROGRESS NOTES
"CC: Follow-up anxiety and depression    HPI:   Justice presents today with the following.    1. Anxiety  Presents after being switched to Effexor.  He reports his mood is somewhat improved and his anxiety is slightly better.  He does report it makes him tired so he takes it at night.  He is not interested in going up on dose he would like to try it for another month but may want to increase to 150 mg.  Overall no extremely negative side effects other than the fatigue which is somewhat helpful because he takes at night and sleeps well.      Patient Active Problem List    Diagnosis Date Noted   • Obesity (BMI 30.0-34.9) 10/12/2017   • Dysthymia 02/17/2017   • OCD (obsessive compulsive disorder) 07/13/2016   • Anxiety 06/05/2015       Current Outpatient Prescriptions   Medication Sig Dispense Refill   • venlafaxine XR (EFFEXOR XR) 75 MG CAPSULE SR 24 HR Take 1 Cap by mouth every day. 30 Cap 6   • TRUVADA 200-300 MG per tablet TAKE ONE TABLET BY MOUTH ONCE DAILY 30 Tab 6   • busPIRone (BUSPAR) 10 MG Tab Take 1 Tab by mouth 2 times a day. 60 Tab 6     No current facility-administered medications for this visit.          Allergies as of 05/09/2018   • (No Known Allergies)        ROS: Denies Chest pain, SOB, LE edema.    /64   Pulse 73   Temp 36.4 °C (97.6 °F)   Resp 16   Ht 1.854 m (6' 1\")   Wt (!) 125.6 kg (277 lb)   SpO2 97%   BMI 36.55 kg/m²     Physical Exam:  Gen:         Alert and oriented, No apparent distress.  Neck:        No Lymphadenopathy or Bruits.  Lungs:     Clear to auscultation bilaterally  CV:          Regular rate and rhythm. No murmurs, rubs or gallops.               Ext:          No clubbing, cyanosis, edema.      Assessment and Plan.   27 y.o. male with the following issues.    1. Anxiety  No change to medications currently have discussed going up to 150 mg if you would like to do so he will email office.        "

## 2018-07-05 ENCOUNTER — OFFICE VISIT (OUTPATIENT)
Dept: URGENT CARE | Facility: CLINIC | Age: 28
End: 2018-07-05
Payer: COMMERCIAL

## 2018-07-05 VITALS
DIASTOLIC BLOOD PRESSURE: 86 MMHG | RESPIRATION RATE: 16 BRPM | OXYGEN SATURATION: 95 % | TEMPERATURE: 98.3 F | WEIGHT: 276.4 LBS | BODY MASS INDEX: 36.63 KG/M2 | HEIGHT: 73 IN | SYSTOLIC BLOOD PRESSURE: 128 MMHG | HEART RATE: 102 BPM

## 2018-07-05 DIAGNOSIS — J02.9 VIRAL PHARYNGITIS: ICD-10-CM

## 2018-07-05 LAB
INT CON NEG: NORMAL
INT CON POS: NORMAL
S PYO AG THROAT QL: NORMAL

## 2018-07-05 PROCEDURE — 99214 OFFICE O/P EST MOD 30 MIN: CPT | Performed by: PHYSICIAN ASSISTANT

## 2018-07-05 PROCEDURE — 87880 STREP A ASSAY W/OPTIC: CPT | Performed by: PHYSICIAN ASSISTANT

## 2018-07-05 RX ORDER — DIPHENHYDRAMINE HYDROCHLORIDE AND LIDOCAINE HYDROCHLORIDE AND ALUMINUM HYDROXIDE AND MAGNESIUM HYDRO
5 KIT EVERY 6 HOURS PRN
Qty: 100 ML | Refills: 0 | Status: SHIPPED | OUTPATIENT
Start: 2018-07-05 | End: 2018-07-05 | Stop reason: SDUPTHER

## 2018-07-05 RX ORDER — DIPHENHYDRAMINE HYDROCHLORIDE AND LIDOCAINE HYDROCHLORIDE AND ALUMINUM HYDROXIDE AND MAGNESIUM HYDRO
5 KIT EVERY 6 HOURS PRN
Qty: 100 ML | Refills: 0 | Status: SHIPPED | OUTPATIENT
Start: 2018-07-05 | End: 2019-01-30

## 2018-07-05 ASSESSMENT — ENCOUNTER SYMPTOMS
SENSORY CHANGE: 0
FOCAL WEAKNESS: 0
COUGH: 0
WHEEZING: 0
HEADACHES: 0
VOMITING: 0
CHILLS: 0
DIARRHEA: 0
SPUTUM PRODUCTION: 0
PALPITATIONS: 0
NAUSEA: 0
MYALGIAS: 0
TINGLING: 0
FEVER: 0
NECK PAIN: 0
SORE THROAT: 1
SHORTNESS OF BREATH: 0
SWOLLEN GLANDS: 0

## 2018-07-05 NOTE — PROGRESS NOTES
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 The current episode started yesterday. The problem has been unchanged. There has been no fever. The pain is moderate. Associated symptoms include congestion. Pertinent negatives include no coughing, diarrhea, ear pain, headaches, plugged ear sensation, neck pain, shortness of breath, swollen glands or vomiting. He has had no exposure to strep or mono. Treatments tried: Throat lozenges. The treatment provided no relief.     Past Medical History:   Diagnosis Date   • Anxiety    • Migraine    • OCD (obsessive compulsive disorder)        Past Surgical History:   Procedure Laterality Date   • APPENDECTOMY  2012       Family History   Problem Relation Age of Onset   • Alcohol/Drug Mother    • Alcohol/Drug Father        No Known Allergies    Medications, Allergies, and current problem list reviewed today in Epic      Review of Systems   Constitutional: Positive for malaise/fatigue. Negative for chills and fever.   HENT: Positive for congestion and sore throat. Negative for ear pain.    Respiratory: Negative for cough, sputum production, shortness of breath and wheezing.    Cardiovascular: Negative for chest pain, palpitations and leg swelling.   Gastrointestinal: Negative for diarrhea, nausea and vomiting.   Musculoskeletal: Negative for myalgias and neck pain.   Skin: Negative for rash.   Neurological: Negative for tingling, sensory change, focal weakness and headaches.     All other systems reviewed and are negative.        Objective:     /86   Pulse (!) 102   Temp 36.8 °C (98.3 °F)   Resp 16   Ht 1.854 m (6' 1\")   Wt (!) 125.4 kg (276 lb 6.4 oz)   SpO2 95%   BMI 36.47 kg/m²      Physical Exam   Constitutional: He is oriented to person, place, and time. He appears well-developed and well-nourished. No distress.   HENT:   Head: " Normocephalic and atraumatic.   Right Ear: Tympanic membrane, external ear and ear canal normal.   Left Ear: Tympanic membrane, external ear and ear canal normal.   Mouth/Throat: Uvula is midline and mucous membranes are normal. Posterior oropharyngeal erythema present. No tonsillar exudate.   Eyes: Conjunctivae are normal.   Neck: Neck supple.   Cardiovascular: Normal rate, regular rhythm and normal heart sounds.  Exam reveals no gallop and no friction rub.    No murmur heard.  Pulmonary/Chest: Effort normal and breath sounds normal. No respiratory distress. He has no wheezes. He has no rales.   Lymphadenopathy:     He has no cervical adenopathy.   Neurological: He is alert and oriented to person, place, and time. No cranial nerve deficit.   Skin: Skin is warm and dry. No rash noted.   Psychiatric: He has a normal mood and affect. His behavior is normal. Judgment and thought content normal.               Assessment/Plan:     1. Viral pharyngitis  POCT Rapid Strep A- negative    DPH-Lido-AlHydr-MgHydr-Simeth (MAGIC MOUTHWASH BLM) Suspension         Viral etiology discussed.  Encouraged fluids, salt water gargles.    Current Outpatient Prescriptions:   •  DPH-Lido-AlHydr-MgHydr-Simeth (MAGIC MOUTHWASH BLM) Suspension, Take 5 mL by mouth every 6 hours as needed. 1:1:1 ratio Swish and SPIT KIT is OK if that is how the pharmacy dispenses this medication, Disp: 100 mL, Rfl: 0  •  . Differential diagnoses, Supportive care, and indications for immediate follow-up discussed with patient.   Instructed to return to clinic or nearest emergency department for any change in condition, further concerns, or worsening of symptoms.    The patient demonstrated a good understanding and agreed with the treatment plan.  Cecile Galo P.A.-C.

## 2018-07-05 NOTE — LETTER
July 5, 2018         Patient: Justice Infante   YOB: 1990   Date of Visit: 7/5/2018           To Whom it May Concern:    Justice Infante was seen in my clinic on 7/5/2018. He is excused from work today for medical illness.    If you have any questions or concerns, please don't hesitate to call.        Sincerely,           Cecile Galo P.A.-C.  Electronically Signed

## 2018-08-16 RX ORDER — DEXAMETHASONE 0.75 MG/1
TABLET ORAL
Qty: 30 TAB | Refills: 6 | Status: SHIPPED | OUTPATIENT
Start: 2018-08-16 | End: 2019-01-30 | Stop reason: SDUPTHER

## 2018-08-29 ENCOUNTER — OFFICE VISIT (OUTPATIENT)
Dept: MEDICAL GROUP | Facility: MEDICAL CENTER | Age: 28
End: 2018-08-29
Payer: COMMERCIAL

## 2018-08-29 VITALS
RESPIRATION RATE: 16 BRPM | HEIGHT: 73 IN | TEMPERATURE: 97.5 F | WEIGHT: 272 LBS | OXYGEN SATURATION: 95 % | HEART RATE: 77 BPM | DIASTOLIC BLOOD PRESSURE: 76 MMHG | BODY MASS INDEX: 36.05 KG/M2 | SYSTOLIC BLOOD PRESSURE: 124 MMHG

## 2018-08-29 DIAGNOSIS — K64.4 SKIN TAG OF ANUS: ICD-10-CM

## 2018-08-29 DIAGNOSIS — F41.9 ANXIETY: ICD-10-CM

## 2018-08-29 DIAGNOSIS — Z11.3 SCREEN FOR STD (SEXUALLY TRANSMITTED DISEASE): ICD-10-CM

## 2018-08-29 DIAGNOSIS — E78.5 DYSLIPIDEMIA: ICD-10-CM

## 2018-08-29 PROCEDURE — 99214 OFFICE O/P EST MOD 30 MIN: CPT | Performed by: INTERNAL MEDICINE

## 2018-08-29 RX ORDER — BUSPIRONE HYDROCHLORIDE 10 MG/1
10 TABLET ORAL DAILY
Qty: 90 TAB | Refills: 3 | Status: SHIPPED | OUTPATIENT
Start: 2018-08-29 | End: 2019-01-30 | Stop reason: SDUPTHER

## 2018-08-30 NOTE — PROGRESS NOTES
"CC: Follow-up anxiety, cholesterol complaining of a skin tag.    HPI:   Justice presents today with the following.    1. Skin tag of anus  Presents reporting an irritated skin tag located on the rectum.  He did discuss with dermatology who reported that he not do such procedure.  He would like it looked at to see if he can be removed.    2. Anxiety  Anxiety is doing quite well he is maintained on medication dual drug therapy denying any major side effects requesting refills.    3. Dyslipidemia  Cholesterol previously abnormal with an HDL of 31.    4. Screen for STD (sexually transmitted disease)        Patient Active Problem List    Diagnosis Date Noted   • Dyslipidemia 08/29/2018   • Obesity (BMI 30.0-34.9) 10/12/2017   • Dysthymia 02/17/2017   • OCD (obsessive compulsive disorder) 07/13/2016   • Anxiety 06/05/2015       Current Outpatient Prescriptions   Medication Sig Dispense Refill   • busPIRone (BUSPAR) 10 MG Tab Take 1 Tab by mouth every day. 90 Tab 3   • TRUVADA 200-300 MG per tablet TAKE ONE TABLET BY MOUTH ONCE DAILY 30 Tab 6   • venlafaxine (EFFEXOR-XR) 150 MG extended-release capsule Take 1 Cap by mouth every day. 90 Cap 3   • DPH-Lido-AlHydr-MgHydr-Simeth (MAGIC MOUTHWASH BLM) Suspension Take 5 mL by mouth every 6 hours as needed. 1:1:1 ratio  Swish and SPIT  KIT is OK if that is how the pharmacy dispenses this medication 100 mL 0     No current facility-administered medications for this visit.          Allergies as of 08/29/2018   • (No Known Allergies)        ROS: Denies Chest pain, SOB, LE edema.    /76   Pulse 77   Temp 36.4 °C (97.5 °F)   Resp 16   Ht 1.854 m (6' 1\")   Wt 123.4 kg (272 lb)   SpO2 95%   BMI 35.89 kg/m²     Physical Exam:  Gen:         Alert and oriented, No apparent distress.  Neck:        No Lymphadenopathy or Bruits.  Lungs:     Clear to auscultation bilaterally  CV:          Regular rate and rhythm. No murmurs, rubs or gallops.               Ext:          No " clubbing, cyanosis, edema.      Assessment and Plan.   28 y.o. male with the following issues.    1. Skin tag of anus  I placed referral to surgeon.  - REFERRAL TO GENERAL SURGERY    2. Anxiety  Stable refilled medication  - busPIRone (BUSPAR) 10 MG Tab; Take 1 Tab by mouth every day.  Dispense: 90 Tab; Refill: 3    3. Dyslipidemia  Rechecking cholesterol.  In discussion about diet exercise.  - LIPID PROFILE; Future    4. Screen for STD (sexually transmitted disease)  Per his request denying any symptomology  - CHLAMYDIA/GC PCR URINE OR SWAB; Future  - HIV AG/AB COMBO ASSAY SCREENING; Future  - RPR TITER+TP-PA REFLEX

## 2018-09-08 ENCOUNTER — HOSPITAL ENCOUNTER (OUTPATIENT)
Dept: LAB | Facility: MEDICAL CENTER | Age: 28
End: 2018-09-08
Attending: INTERNAL MEDICINE
Payer: COMMERCIAL

## 2018-09-08 DIAGNOSIS — Z11.3 SCREEN FOR STD (SEXUALLY TRANSMITTED DISEASE): ICD-10-CM

## 2018-09-08 DIAGNOSIS — E78.5 DYSLIPIDEMIA: ICD-10-CM

## 2018-09-08 LAB
ALBUMIN SERPL BCP-MCNC: 4.5 G/DL (ref 3.2–4.9)
ALBUMIN/GLOB SERPL: 1.6 G/DL
ALP SERPL-CCNC: 87 U/L (ref 30–99)
ALT SERPL-CCNC: 36 U/L (ref 2–50)
ANION GAP SERPL CALC-SCNC: 8 MMOL/L (ref 0–11.9)
AST SERPL-CCNC: 25 U/L (ref 12–45)
BILIRUB SERPL-MCNC: 0.7 MG/DL (ref 0.1–1.5)
BUN SERPL-MCNC: 8 MG/DL (ref 8–22)
CALCIUM SERPL-MCNC: 10.2 MG/DL (ref 8.5–10.5)
CHLORIDE SERPL-SCNC: 105 MMOL/L (ref 96–112)
CHOLEST SERPL-MCNC: 137 MG/DL (ref 100–199)
CO2 SERPL-SCNC: 26 MMOL/L (ref 20–33)
CREAT SERPL-MCNC: 0.78 MG/DL (ref 0.5–1.4)
FASTING STATUS PATIENT QL REPORTED: NORMAL
GLOBULIN SER CALC-MCNC: 2.8 G/DL (ref 1.9–3.5)
GLUCOSE SERPL-MCNC: 87 MG/DL (ref 65–99)
HDLC SERPL-MCNC: 31 MG/DL
HIV 1+2 AB+HIV1 P24 AG SERPL QL IA: NON REACTIVE
LDLC SERPL CALC-MCNC: 72 MG/DL
POTASSIUM SERPL-SCNC: 3.6 MMOL/L (ref 3.6–5.5)
PROT SERPL-MCNC: 7.3 G/DL (ref 6–8.2)
SODIUM SERPL-SCNC: 139 MMOL/L (ref 135–145)
TREPONEMA PALLIDUM IGG+IGM AB [PRESENCE] IN SERUM OR PLASMA BY IMMUNOASSAY: NON REACTIVE
TRIGL SERPL-MCNC: 172 MG/DL (ref 0–149)

## 2018-09-08 PROCEDURE — 86780 TREPONEMA PALLIDUM: CPT

## 2018-09-08 PROCEDURE — 80053 COMPREHEN METABOLIC PANEL: CPT

## 2018-09-08 PROCEDURE — 87389 HIV-1 AG W/HIV-1&-2 AB AG IA: CPT

## 2018-09-08 PROCEDURE — 87491 CHLMYD TRACH DNA AMP PROBE: CPT

## 2018-09-08 PROCEDURE — 87591 N.GONORRHOEAE DNA AMP PROB: CPT

## 2018-09-08 PROCEDURE — 36415 COLL VENOUS BLD VENIPUNCTURE: CPT

## 2018-09-08 PROCEDURE — 80061 LIPID PANEL: CPT

## 2019-01-30 ENCOUNTER — HOSPITAL ENCOUNTER (OUTPATIENT)
Dept: LAB | Facility: MEDICAL CENTER | Age: 29
End: 2019-01-30
Attending: INTERNAL MEDICINE
Payer: COMMERCIAL

## 2019-01-30 ENCOUNTER — OFFICE VISIT (OUTPATIENT)
Dept: MEDICAL GROUP | Facility: MEDICAL CENTER | Age: 29
End: 2019-01-30
Payer: COMMERCIAL

## 2019-01-30 VITALS
HEIGHT: 73 IN | WEIGHT: 268 LBS | OXYGEN SATURATION: 94 % | BODY MASS INDEX: 35.52 KG/M2 | DIASTOLIC BLOOD PRESSURE: 64 MMHG | HEART RATE: 82 BPM | TEMPERATURE: 97 F | SYSTOLIC BLOOD PRESSURE: 114 MMHG

## 2019-01-30 DIAGNOSIS — Z00.00 WELLNESS EXAMINATION: ICD-10-CM

## 2019-01-30 DIAGNOSIS — Z11.3 SCREEN FOR STD (SEXUALLY TRANSMITTED DISEASE): ICD-10-CM

## 2019-01-30 DIAGNOSIS — F41.9 ANXIETY: ICD-10-CM

## 2019-01-30 LAB
ALBUMIN SERPL BCP-MCNC: 4.6 G/DL (ref 3.2–4.9)
ALBUMIN/GLOB SERPL: 1.9 G/DL
ALP SERPL-CCNC: 86 U/L (ref 30–99)
ALT SERPL-CCNC: 35 U/L (ref 2–50)
ANION GAP SERPL CALC-SCNC: 4 MMOL/L (ref 0–11.9)
AST SERPL-CCNC: 24 U/L (ref 12–45)
BILIRUB SERPL-MCNC: 0.5 MG/DL (ref 0.1–1.5)
BUN SERPL-MCNC: 11 MG/DL (ref 8–22)
CALCIUM SERPL-MCNC: 9.7 MG/DL (ref 8.5–10.5)
CHLORIDE SERPL-SCNC: 106 MMOL/L (ref 96–112)
CO2 SERPL-SCNC: 26 MMOL/L (ref 20–33)
CREAT SERPL-MCNC: 0.84 MG/DL (ref 0.5–1.4)
GLOBULIN SER CALC-MCNC: 2.4 G/DL (ref 1.9–3.5)
GLUCOSE SERPL-MCNC: 80 MG/DL (ref 65–99)
HIV 1+2 AB+HIV1 P24 AG SERPL QL IA: NON REACTIVE
POTASSIUM SERPL-SCNC: 4.1 MMOL/L (ref 3.6–5.5)
PROT SERPL-MCNC: 7 G/DL (ref 6–8.2)
SODIUM SERPL-SCNC: 136 MMOL/L (ref 135–145)

## 2019-01-30 PROCEDURE — 87491 CHLMYD TRACH DNA AMP PROBE: CPT

## 2019-01-30 PROCEDURE — 99395 PREV VISIT EST AGE 18-39: CPT | Performed by: INTERNAL MEDICINE

## 2019-01-30 PROCEDURE — 36415 COLL VENOUS BLD VENIPUNCTURE: CPT

## 2019-01-30 PROCEDURE — 80053 COMPREHEN METABOLIC PANEL: CPT

## 2019-01-30 PROCEDURE — 87389 HIV-1 AG W/HIV-1&-2 AB AG IA: CPT

## 2019-01-30 PROCEDURE — 87591 N.GONORRHOEAE DNA AMP PROB: CPT

## 2019-01-30 RX ORDER — VENLAFAXINE HYDROCHLORIDE 150 MG/1
150 CAPSULE, EXTENDED RELEASE ORAL DAILY
Qty: 90 CAP | Refills: 3 | Status: SHIPPED | OUTPATIENT
Start: 2019-01-30 | End: 2019-05-28

## 2019-01-30 RX ORDER — BUSPIRONE HYDROCHLORIDE 10 MG/1
10 TABLET ORAL DAILY
Qty: 90 TAB | Refills: 3 | Status: SHIPPED | OUTPATIENT
Start: 2019-01-30 | End: 2019-05-20 | Stop reason: SDUPTHER

## 2019-01-30 RX ORDER — EMTRICITABINE AND TENOFOVIR DISOPROXIL FUMARATE 200; 300 MG/1; MG/1
1 TABLET, FILM COATED ORAL DAILY
Qty: 90 TAB | Refills: 3 | Status: SHIPPED | OUTPATIENT
Start: 2019-01-30 | End: 2020-03-13 | Stop reason: SDUPTHER

## 2019-01-31 NOTE — PROGRESS NOTES
"CC: Wellness examination.    HPI:   Justice presents today with the following.    1. Wellness examination  Resents for wellness examination without particular complaints.  He is due for some screening blood work and would like the STD panel as well denying any current symptomology anxiety is doing well on current regimen he is current with all major screenings.          Patient Active Problem List    Diagnosis Date Noted   • Dyslipidemia 08/29/2018   • Obesity (BMI 30.0-34.9) 10/12/2017   • Dysthymia 02/17/2017   • OCD (obsessive compulsive disorder) 07/13/2016   • Anxiety 06/05/2015       Current Outpatient Prescriptions   Medication Sig Dispense Refill   • venlafaxine (EFFEXOR-XR) 150 MG extended-release capsule Take 1 Cap by mouth every day. 90 Cap 3   • emtricitabine-tenofovir (TRUVADA) 200-300 MG per tablet Take 1 Tab by mouth every day. 90 Tab 3   • busPIRone (BUSPAR) 10 MG Tab tablet Take 1 Tab by mouth every day. 90 Tab 3     No current facility-administered medications for this visit.          Allergies as of 01/30/2019   • (No Known Allergies)        ROS: Denies Chest pain, SOB, LE edema.    /64 (BP Location: Right arm, Patient Position: Sitting)   Pulse 82   Temp 36.1 °C (97 °F)   Ht 1.854 m (6' 1\")   Wt 121.6 kg (268 lb)   SpO2 94%   BMI 35.36 kg/m²     Physical Exam:  Gen:         Alert and oriented, No apparent distress.  Neck:        No Lymphadenopathy or Bruits.  Lungs:     Clear to auscultation bilaterally  CV:          Regular rate and rhythm. No murmurs, rubs or gallops.               Ext:          No clubbing, cyanosis, edema.      Assessment and Plan.   28 y.o. male with the following issues.    1. Wellness examination  Discussed healthy lifestyle habits as well as screening regimens.    "

## 2019-04-10 ENCOUNTER — OFFICE VISIT (OUTPATIENT)
Dept: MEDICAL GROUP | Facility: MEDICAL CENTER | Age: 29
End: 2019-04-10
Payer: COMMERCIAL

## 2019-04-10 VITALS
HEART RATE: 73 BPM | HEIGHT: 73 IN | WEIGHT: 263 LBS | TEMPERATURE: 97.2 F | SYSTOLIC BLOOD PRESSURE: 130 MMHG | DIASTOLIC BLOOD PRESSURE: 60 MMHG | BODY MASS INDEX: 34.85 KG/M2 | OXYGEN SATURATION: 96 %

## 2019-04-10 DIAGNOSIS — F33.1 MODERATE EPISODE OF RECURRENT MAJOR DEPRESSIVE DISORDER (HCC): ICD-10-CM

## 2019-04-10 DIAGNOSIS — F41.9 ANXIETY: ICD-10-CM

## 2019-04-10 DIAGNOSIS — F42.9 OBSESSIVE-COMPULSIVE DISORDER, UNSPECIFIED TYPE: ICD-10-CM

## 2019-04-10 PROBLEM — F34.1 DYSTHYMIA: Status: RESOLVED | Noted: 2017-02-17 | Resolved: 2019-04-10

## 2019-04-10 PROCEDURE — 99213 OFFICE O/P EST LOW 20 MIN: CPT | Performed by: INTERNAL MEDICINE

## 2019-04-11 NOTE — PROGRESS NOTES
"CC: Worsening mood.    HPI:   Justice presents today with the following.    1. Anxiety/. Moderate episode of recurrent major depressive disorder (HCC)/ Obsessive-compulsive disorder, unspecified type  Presents with complaining of worsening mood for the last 2 months.  He is maintained on Effexor and BuSpar in combination.  Was working well.  He does see psychology regularly.  He reports some suicidal ideation but no plans and does not feel himself a threat.  He has seen psychiatry in the past but would like to be referred to somebody different.  He is compliant with medications denies any substance abuse.      Patient Active Problem List    Diagnosis Date Noted   • Moderate episode of recurrent major depressive disorder (HCC) 04/10/2019   • Dyslipidemia 08/29/2018   • Obesity (BMI 30.0-34.9) 10/12/2017   • OCD (obsessive compulsive disorder) 07/13/2016   • Anxiety 06/05/2015       Current Outpatient Prescriptions   Medication Sig Dispense Refill   • venlafaxine (EFFEXOR-XR) 150 MG extended-release capsule Take 1 Cap by mouth every day. 90 Cap 3   • emtricitabine-tenofovir (TRUVADA) 200-300 MG per tablet Take 1 Tab by mouth every day. 90 Tab 3   • busPIRone (BUSPAR) 10 MG Tab tablet Take 1 Tab by mouth every day. 90 Tab 3     No current facility-administered medications for this visit.          Allergies as of 04/10/2019   • (No Known Allergies)        ROS: Denies Chest pain, SOB, LE edema.    /60 (BP Location: Left arm, Patient Position: Sitting)   Pulse 73   Temp 36.2 °C (97.2 °F)   Ht 1.854 m (6' 1\")   Wt 119.3 kg (263 lb)   SpO2 96%   BMI 34.70 kg/m²     Physical Exam:  Gen:         Alert and oriented, No apparent distress.  Neck:        No Lymphadenopathy or Bruits.  Lungs:     Clear to auscultation bilaterally  CV:          Regular rate and rhythm. No murmurs, rubs or gallops.               Ext:          No clubbing, cyanosis, edema.      Assessment and Plan.   28 y.o. male with the following " issues.    1. Anxiety/Moderate episode of recurrent major depressive disorder (HCC)/Obsessive-compulsive disorder, unspecified type  Have referred back to psychiatry for adjustment of medications.  He will follow-up immediately if he has worsened mood he does have contacts that are aware of his worsened mood.  - REFERRAL TO BEHAVIORAL HEALTH

## 2019-05-06 ENCOUNTER — OFFICE VISIT (OUTPATIENT)
Dept: BEHAVIORAL HEALTH | Facility: CLINIC | Age: 29
End: 2019-05-06
Payer: COMMERCIAL

## 2019-05-06 VITALS
DIASTOLIC BLOOD PRESSURE: 90 MMHG | HEIGHT: 73 IN | HEART RATE: 81 BPM | SYSTOLIC BLOOD PRESSURE: 135 MMHG | WEIGHT: 265 LBS | RESPIRATION RATE: 18 BRPM | BODY MASS INDEX: 35.12 KG/M2

## 2019-05-06 DIAGNOSIS — F12.20 CANNABIS DEPENDENCE (HCC): ICD-10-CM

## 2019-05-06 DIAGNOSIS — F34.1 DYSTHYMIA: ICD-10-CM

## 2019-05-06 DIAGNOSIS — F41.9 ANXIETY DISORDER, UNSPECIFIED TYPE: ICD-10-CM

## 2019-05-06 PROCEDURE — 90833 PSYTX W PT W E/M 30 MIN: CPT | Performed by: NURSE PRACTITIONER

## 2019-05-06 PROCEDURE — 99214 OFFICE O/P EST MOD 30 MIN: CPT | Performed by: NURSE PRACTITIONER

## 2019-05-06 RX ORDER — VENLAFAXINE HYDROCHLORIDE 75 MG/1
75 CAPSULE, EXTENDED RELEASE ORAL DAILY
Qty: 30 CAP | Refills: 0 | Status: SHIPPED | OUTPATIENT
Start: 2019-05-06 | End: 2019-05-20 | Stop reason: SDUPTHER

## 2019-05-06 ASSESSMENT — PATIENT HEALTH QUESTIONNAIRE - PHQ9
5. POOR APPETITE OR OVEREATING: 1 - SEVERAL DAYS
SUM OF ALL RESPONSES TO PHQ QUESTIONS 1-9: 7
CLINICAL INTERPRETATION OF PHQ2 SCORE: 2

## 2019-05-06 NOTE — PROGRESS NOTES
"PSYCHIATRY FOLLOW-UP NOTE    Chief Complaint:    \"I feel stuck.\"    History Of Present Illness:  Justice Infante is a 28 y.o. male with depression and anxiety comes in today for follow up, was last seen in this office by on 2.17.17 by Dr. Mcdonough.  Patient is new to this provider, but not this clinic.     The patient feels like lately he feels \"stuck.\"  He is not satisfied with his current life situation and where he is at in life. He no longer enjoys his job and aspires to do more, although unsure of what that is.  He feels like he has maxed out his responsibilities at work and can not go anywhere from here. Sees a weekly therapist for the last 2 years to help with these feelings.    Last time he was at this clinic he was on Prozac 80mg q day and Xanax.  Since that time, he felt like the Prozac was no longer working and was weaned off of it.  He was taken off Xanax by his PCP and was placed on Buspar. Has has since been on Effexor XR 150mg q day and Buspar 10mg q day for approximately a year now. He states his mood is generally a 7/10 with 10 being the best mood ever.  His sleep is \"pretty decent\" getting 7-8hrs regularly. His appetite is \"declining.\" He has been experiencing mild anhedonia. His energy level and concentration fluctuates.  His PHQ9 score today is 7. He does admit to passive fleeting suicidal thoughts, although denies intent and plan.  He does have a history of self-mutilation but not in over 10 years.  He does have a history of SI, but never attempted.     He feels likes he worries all the time, although admits it has gotten better lately.  He used to have trouble controlling his worry, not so much anymore. He feels that therapy has been helpful with this.  Does not feel restless/tense, and rarely feels irritable.  Has not struggled with panic attacks in over 10 years. Social situations often make him nervous and feels like he is \"constantly in my own head.\"  He feels like going out causes " "him too much social anxiety but staying in causes him too much depression.  He constantly struggles with self-confidence. He verbalizes a traumatic childhood as his mother used drugs and \"chose men over her children.\"  He thinks about this often and how it has affected his life, but denies flashbacks and nightmares.  He denies struggling with OCD behaviors nowadays. Listening to piano music while having an aroma diffuser on helps calm him down when he gets anxious. Also, daily marijuana use helps with anxiety.     The patient denies symptoms of hypomania, psychosis and homicidal ideations.    Current Psychotropic Medications:  Effexor XR 150mg po q am  Buspar 10mg q day    Past Psychotropic Medication Trials:  Prozac: maxed out and was not working anymore   Lithium: weight gain  Trazodone  Wellbutrin: no longer effective  Zoloft: no longer effective  Seroquel  Ativan     Social History:   From [place], [place].  Has been in [place] for the last 5 years.  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 He lives alone in an apartment. He identifies as homosexual and is currently single. His grandmother is his biggest support system. He is estranged from his mother currently, and has not had any contact with her in over 3 years.  He has been going over to his friend's house on the weekends.     Substance Use:  Alcohol: Social drinking, drinks once a month.   Nicotine: Denies   Illicit drugs: Using marijuana daily for 2 years  Caffeine: 2 cups/day    Medications:  Current Outpatient Prescriptions   Medication Sig Dispense Refill   • venlafaxine (EFFEXOR-XR) 150 MG extended-release capsule Take 1 Cap by mouth every day. 90 Cap 3   • emtricitabine-tenofovir (TRUVADA) 200-300 MG per tablet Take 1 Tab by mouth every day. 90 Tab 3   • busPIRone (BUSPAR) 10 MG Tab tablet Take 1 " "Tab by mouth every day. 90 Tab 3     No current facility-administered medications for this visit.      Depression Screening (PHQ-9 Score) Today: 7  Depression Screen (PHQ-2/PHQ-9) 6/5/2015 7/13/2016 1/26/2017   PHQ-2 Total Score 0 - -   PHQ-2 Total Score - 2 0     Interpretation of PHQ-9 Total Score   Score Severity   1-4 No Depression   5-9 Mild Depression   10-14 Moderate Depression   15-19 Moderately Severe Depression   20-27 Severe Depression    Physical Examination:  /90   Pulse 81   Resp 18   Ht 1.854 m (6' 1\")   Wt 120.2 kg (265 lb)   BMI 34.96 kg/m²     Review of Symptoms:  Constitutional: denies recent chills, fevers.  Overweight.   Neuro: denies recent weakness, numbness, or headaches.   HEENT: denies recent nasal congestion or sore throat.  Cardiovascular: hx of dyslipidemia.   Respiratory: denies recent shortness of breath, dyspnea, or cough.  GI: denies recent nausea, vomiting, or diarrhea.  : denies recent urinary urgency frequency or urgency.  Musculoskeletal: age-appropriate gait and station, good balance. No abnormal movements noted.   Skin: denies recent rash or skin lesions.   Endocrine: denies recent cold and heat intolerance, denies excessive thirst.   Hematologic: denies recent abnormal bleeding and bruising easily.  Psychiatric: Positive for symptoms of depression and anxiety     Mental Status Examination:   Appearance: 28 y.o.  male, tall, thick-rimmed glasses, earrings, dressed in casual attire, neat,  Behavior: cooperative, good eye contact, no psychomotor agitation or retardation noted  Participation: active verbal participation, engaged  Speech: spontaneous, regular rate, rhythm, and volume noted.  Language appropriate.  Mood: \"stuck.\"  Affect: anxious and mood congruent  Orientation: alert and oriented to person, place, situation, and time.  Attention/concentration: Fair.  Associations/Abstract reasoning:Adequate.   Thought Process: linear, logical, and " goal-directed  Thought Content: denies passive/active suicidal or homicidal ideations, intent, or plan  Perception: denies auditory or visual hallucinations, no delusions noted  Fund of knowledge and vocabulary: Adequate.  Memory: No gross evidence of memory deficits  Insight: Fair.  Judgment: Fair.    Medical Records/Labs/Diagnostic Tests:  No  records found within the last 3 years, recent relevant provider encounters reviewed, recent relevant labs in record reviewed    Strengths/Assets:  Patient strengths identified included intelligence, resilience, evidence of good judgment, self-awareness, social support, sense of humor, social skills      Impression:  Persistent depressive disorder (dysthymia)   Anxiety disorder, unspecified   Cannabis dependence  Again, Borderline and avoidant personality disorder traits - chronic feelings of emptiness, problems with self worth and confidence, unstable relationship, fear of abandonment, history of self harm behaviors, fear of criticism and rejection.    Plan:  1. Medication options, alternatives (including no medications) and medication risks/benefits/side effects were discussed in detail.   2. Increase Effexor XR to 225mg po q am for continued depression/anxiety. Discussed venlafaxine's 4-6 week period to assess for efficacy. Discussed side effects including HTN, HA, sweating,  nausea/vomiting, abdominal discomfort/pain, sleep disturbances, anxiety, agitation,  fatigue, sexual dysfunction, and risk for suicidal ideations.  Verbalized understanding.  3. Continue Buspar 10mg q day for continued anxiety.   4. Continue weekly psychotherapy with ANNE Powers.  5.  Labs from chart reviewed.   6. Used motivational interviewing techniques to explore potential benefits and disadvantages of cannabis use for overall health. Explored the paucity of evidence of cannabis use on mental health conditions. Explored the dangers and negative health effects of continued cannabis use.  Ambivalent to quit at this time.  7.   The patient was counseled on the benefits of engaging in 30 minutes of aerobic physical exercise 3-5 times a week to the patient's ability to help with psychiatric symptoms, verbalized understanding.  8.   Educated on caffeine's correlation with anxiety.  Discussed the potential benefits of decreasing caffeine on current psychiatric symptoms, verbalized understanding.  9. The patient was advised to call, message provider on [employer], or come in to the clinic if symptoms worsen or if any future questions/issues regarding their medications arise; the patient verbalized understanding and agreement.    10. The patient was educated to call 911, call the suicide hotline, or go to local ER if having thoughts of suicide or homicide; verbalized understanding.    Return to clinic in 3-4 weeks or sooner if symptoms worsen    The proposed treatment plan was discussed with the patient who was provided the opportunity to ask questions and make suggestions regarding alternative treatment. Patient verbalized understanding and expressed agreement with the plan.     Total face-to-face time: 75 minutes with more than 50% of face-to-face time spent in counseling and coordinating care as stated in the above plan.     Adán Flanagan A.P.R.N.  05/06/19    This note was created using voice recognition software (Dragon). The accuracy of the dictation is limited by the abilities of the software. I have reviewed the note prior to signing, however some errors in grammar and context are still possible. If you have any questions related to this note please do not hesitate to contact our office.

## 2019-05-07 NOTE — PROGRESS NOTES
"PSYCHIATRY FOLLOW-UP NOTE    Chief Complaint:    \"I'm okay ... Just stressed.\"    History Of Present Illness:  Justice Infante is a 28 y.o. male with Persistent depressive disorder, anxiety disorder, unspecified, and cannabis dependence comes in today for follow up, was last seen in this office by this provider on 5.6.19.    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 He questions what he will do now with his life and cannot imagine staying at his job for another year. He continues to be stressed and dissatisfied with his current job and notes he has applied for a few different jobs this week.      He started the increased dosage of Effexor, which he seems to be tolerating well.  He does experience some nights sweats initially, which have seemed to passed.  He has been feeling \"pavan\" lately.  His energy is normal, he is sleeping \"decent\". He has had motivation to do things he has put off, such as cleaning his apartment, which has not been cleaned in months.  His appetite remains low and has lost a few pounds since last visit.   He notes he is \"just rona getting through it one day at a time.\"  He denies SI, passive or active today.       His anxiety is \"alright\" and feels like it is controllable at this time. He continues to see a weekly therapist, as he has for the last few years, which is helpful to him. Denies panic attacks, denies irritability. His social anxiety is \"managemable.\"  He denies flashbacks and nightmares. He continues to use marijuana daily to help with anxiety, ambivalent to quit at this time. The patient denies symptoms of hypomania, psychosis and homicidal ideations.     Current Psychotropic Medications:  Effexor XR 225mg po q am  Buspar 10mg q day     Past Psychotropic Medication Trials:  Prozac: maxed out and was not working anymore   Lithium: weight gain  Trazodone  Wellbutrin: no longer effective  Zoloft: no longer effective  Seroquel  Ativan      Social History:   He is a single " "homosexual man.  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 He lives alone in an apartment. His grandmother is his biggest support system.  He continues going over to his friend's house on the weekends.  He went over to his friends house for his birthday, which was nice.      Substance Use:  Alcohol: Social drinking, drinks once a month.   Nicotine: Denies   Illicit drugs: Using marijuana daily for 2 years  Caffeine: 2 cups/day    Medications:  Current Outpatient Prescriptions   Medication Sig Dispense Refill   • venlafaxine XR (EFFEXOR XR) 75 MG CAPSULE SR 24 HR Take 1 Cap by mouth every day. 30 Cap 0   • venlafaxine (EFFEXOR-XR) 150 MG extended-release capsule Take 1 Cap by mouth every day. 90 Cap 3   • emtricitabine-tenofovir (TRUVADA) 200-300 MG per tablet Take 1 Tab by mouth every day. 90 Tab 3   • busPIRone (BUSPAR) 10 MG Tab tablet Take 1 Tab by mouth every day. 90 Tab 3     No current facility-administered medications for this visit.      Depression Screening (PHQ-9 Score):  Depression Screen (PHQ-2/PHQ-9) 7/13/2016 1/26/2017 5/6/2019   PHQ-2 Total Score - - -   PHQ-2 Total Score 2 0 2   PHQ-9 Total Score - - 7     Interpretation of PHQ-9 Total Score   Score Severity   1-4 No Depression   5-9 Mild Depression   10-14 Moderate Depression   15-19 Moderately Severe Depression   20-27 Severe Depression    Physical Examination:  142/88, 71, 16, 262lbs, 6'1\"     Review of Symptoms:  Constitutional: denies recent chills, fevers.  Overweight.   Neuro: denies recent weakness, numbness, or headaches.   HEENT: denies recent nasal congestion or sore throat.  Cardiovascular: hx of dyslipidemia.   Respiratory: denies recent shortness of breath, dyspnea, or cough.  GI: denies recent nausea, vomiting, or diarrhea.  : denies recent urinary urgency frequency or urgency.  Musculoskeletal: age-appropriate gait and station, good balance. No abnormal movements noted.   Skin: denies recent rash or skin " "lesions.   Endocrine: denies recent cold and heat intolerance, denies excessive thirst.   Hematologic: denies recent abnormal bleeding and bruising easily.  Psychiatric: Positive for symptoms of depression and anxiety      Mental Status Examination:   Appearance: 28 y.o.  male, tall, earrings, dressed in casual attire, neat   Behavior: cooperative, good eye contact, no psychomotor agitation or retardation noted  Participation: active verbal participation, engaged  Speech: spontaneous, regular rate, rhythm, and volume noted.  Language appropriate.  Mood: \"stressed.\"  Affect: anxious and mood congruent  Orientation: alert and oriented to person, place, situation, and time.  Attention/concentration: Fair.  Associations/Abstract reasoning:Adequate.   Thought Process: linear, logical, and goal-directed  Thought Content: denies passive/active suicidal or homicidal ideations, intent, or plan  Perception: denies auditory or visual hallucinations, no delusions noted  Fund of knowledge and vocabulary: Adequate.  Memory: No gross evidence of memory deficits  Insight: Fair.   Judgment: Fair.     Medical Records/Labs/Diagnostic Tests:  No  records found within the last 3 years, recent relevant provider encounters reviewed, recent relevant labs in record reviewed     Strengths/Assets:  Patient strengths identified included intelligence, resilience, evidence of good judgment, self-awareness, social support, sense of humor, social skills                            Impression:  Persistent depressive disorder (dysthymia)   Anxiety disorder, unspecified   Cannabis dependence  Again, Borderline and avoidant personality disorder traits - chronic feelings of emptiness, problems with self worth and confidence, unstable relationship, fear of abandonment, history of self harm behaviors, fear of criticism and rejection.     Plan:  1. Medication options, alternatives (including no medications) and medication risks/benefits/side " effects were discussed in detail.   2. Continue Effexor XR to 225mg po q am for continued depression/anxiety.   3. Continue Buspar 10mg q day for continued anxiety.   4. Continue weekly psychotherapy with ANNE Powers.  5. The patient was advised to call, message provider on [employer], or come in to the clinic if symptoms worsen or if any future questions/issues regarding their medications arise; the patient verbalized understanding and agreement.    6. The patient was educated to call 911, call the suicide hotline, or go to local ER if having thoughts of suicide or homicide; verbalized understanding.    Return to clinic in 1-2 months or sooner if symptoms worsen    The proposed treatment plan was discussed with the patient who was provided the opportunity to ask questions and make suggestions regarding alternative treatment. Patient verbalized understanding and expressed agreement with the plan.     Total face-to-face time: 25 minutes with more than 50% of face-to-face time spent in counseling and coordinating care as stated in the above plan.     SHANTA [email or web address].  05/07/19    This note was created using voice recognition software (Dragon). The accuracy of the dictation is limited by the abilities of the software. I have reviewed the note prior to signing, however some errors in grammar and context are still possible. If you have any questions related to this note please do not hesitate to contact our office.

## 2019-05-20 ENCOUNTER — OFFICE VISIT (OUTPATIENT)
Dept: BEHAVIORAL HEALTH | Facility: CLINIC | Age: 29
End: 2019-05-20
Payer: COMMERCIAL

## 2019-05-20 VITALS
BODY MASS INDEX: 34.72 KG/M2 | RESPIRATION RATE: 16 BRPM | HEART RATE: 71 BPM | HEIGHT: 73 IN | SYSTOLIC BLOOD PRESSURE: 142 MMHG | WEIGHT: 262 LBS | DIASTOLIC BLOOD PRESSURE: 88 MMHG

## 2019-05-20 DIAGNOSIS — F41.9 ANXIETY: ICD-10-CM

## 2019-05-20 DIAGNOSIS — F41.9 ANXIETY DISORDER, UNSPECIFIED TYPE: ICD-10-CM

## 2019-05-20 DIAGNOSIS — F34.1 DYSTHYMIA: ICD-10-CM

## 2019-05-20 PROCEDURE — 99213 OFFICE O/P EST LOW 20 MIN: CPT | Performed by: NURSE PRACTITIONER

## 2019-05-20 RX ORDER — VENLAFAXINE HYDROCHLORIDE 75 MG/1
75 CAPSULE, EXTENDED RELEASE ORAL DAILY
Qty: 90 CAP | Refills: 0 | Status: SHIPPED | OUTPATIENT
Start: 2019-05-20 | End: 2019-05-28

## 2019-05-20 RX ORDER — BUSPIRONE HYDROCHLORIDE 10 MG/1
10 TABLET ORAL DAILY
Qty: 90 TAB | Refills: 0 | Status: SHIPPED | OUTPATIENT
Start: 2019-05-20 | End: 2019-10-02

## 2019-05-28 ENCOUNTER — TELEPHONE (OUTPATIENT)
Dept: BEHAVIORAL HEALTH | Facility: CLINIC | Age: 29
End: 2019-05-28

## 2019-05-28 RX ORDER — VENLAFAXINE HYDROCHLORIDE 225 MG/1
225 TABLET, EXTENDED RELEASE ORAL DAILY
Qty: 90 TAB | Refills: 0 | Status: SHIPPED | OUTPATIENT
Start: 2019-05-28 | End: 2019-09-02 | Stop reason: SDUPTHER

## 2019-06-17 NOTE — PROGRESS NOTES
"PSYCHIATRY FOLLOW-UP NOTE    Chief Complaint:    \"***\"    History Of Present Illness:  Justice Infante is a 28 y.o. male with Persistent depressive disorder, anxiety disorder, unspecified, and cannabis dependence comes in today for follow up, was last seen in this office by this provider on 5.20.19.     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 He questions what he will do now with his life and cannot imagine staying at his job for another year. He continues to be stressed and dissatisfied with his current job and notes he has applied for a few different jobs this week.       He started the increased dosage of Effexor, which he seems to be tolerating well.  He does experience some nights sweats initially, which have seemed to passed.  He has been feeling \"pavan\" lately.  His energy is normal, he is sleeping \"decent\". He has had motivation to do things he has put off, such as cleaning his apartment, which has not been cleaned in months.  His appetite remains low and has lost a few pounds since last visit.   He notes he is \"just rona getting through it one day at a time.\"  He denies SI, passive or active today.       His anxiety is \"alright\" and feels like it is controllable at this time. He continues to see a weekly therapist, as he has for the last few years, which is helpful to him. Denies panic attacks, denies irritability. His social anxiety is \"managemable.\"  He denies flashbacks and nightmares. He continues to use marijuana daily to help with anxiety, ambivalent to quit at this time. The patient denies symptoms of hypomania, psychosis and homicidal ideations.     Current Psychotropic Medications:  Effexor XR 225mg po q am  Buspar 10mg q day     Past Psychotropic Medication Trials:  Prozac: maxed out and was not working anymore   Lithium: weight gain  Trazodone  Wellbutrin: no longer effective  Zoloft: no longer effective  Seroquel  Ativan      Social History:   He is a single homosexual man.  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 He lives alone in an apartment. His grandmother is his biggest support system.  He continues going over to his friend's house on the weekends.  He went over to his friends house for his birthday, which was nice.      Substance Use:  Alcohol: Social drinking, drinks once a month.   Nicotine: Denies   Illicit drugs: Using marijuana daily for 2 years  Caffeine: 2 cups/day    Medications:  Current Outpatient Prescriptions   Medication Sig Dispense Refill   • venlafaxine ER (EFFEXOR) 225 MG TABLET SR 24 HR tablet Take 1 Tab by mouth every day. 90 Tab 0   • busPIRone (BUSPAR) 10 MG Tab tablet Take 1 Tab by mouth every day. 90 Tab 0   • emtricitabine-tenofovir (TRUVADA) 200-300 MG per tablet Take 1 Tab by mouth every day. 90 Tab 3     No current facility-administered medications for this visit.      Depression Screening (PHQ-9 Score): ***  Depression Screen (PHQ-2/PHQ-9) 7/13/2016 1/26/2017 5/6/2019   PHQ-2 Total Score - - -   PHQ-2 Total Score 2 0 2   PHQ-9 Total Score - - 7     Interpretation of PHQ-9 Total Score   Score Severity   1-4 No Depression   5-9 Mild Depression   10-14 Moderate Depression   15-19 Moderately Severe Depression   20-27 Severe Depression    Physical Examination:  142/88, 71, 16, 262lbs, 6'1\"  ***     Review of Symptoms:  Constitutional: denies recent chills, fevers.  Overweight.   Neuro: denies recent weakness, numbness, or headaches.   HEENT: denies recent nasal congestion or sore throat.  Cardiovascular: hx of dyslipidemia.   Respiratory: denies recent shortness of breath, dyspnea, or cough.  GI: denies recent nausea, vomiting, or diarrhea.  : denies recent urinary urgency frequency or urgency.  Musculoskeletal: age-appropriate gait and station, good balance. No abnormal movements noted.   Skin: denies recent rash or skin lesions.   Endocrine: denies recent cold and heat intolerance, denies excessive thirst.   Hematologic: denies " "recent abnormal bleeding and bruising easily.  Psychiatric: Positive for symptoms of depression and anxiety      Mental Status Examination:   Appearance: 28 y.o.  male, tall, earrings, dressed in casual attire, neat   Behavior: cooperative, good eye contact, no psychomotor agitation or retardation noted  Participation: active verbal participation, engaged  Speech: spontaneous, regular rate, rhythm, and volume noted.  Language appropriate.  Mood: \"stressed.\"  Affect: anxious and mood congruent  Orientation: alert and oriented to person, place, situation, and time.  Attention/concentration: Fair.  Associations/Abstract reasoning:Adequate.   Thought Process: linear, logical, and goal-directed  Thought Content: denies passive/active suicidal or homicidal ideations, intent, or plan  Perception: denies auditory or visual hallucinations, no delusions noted  Fund of knowledge and vocabulary: Adequate.  Memory: No gross evidence of memory deficits  Insight: Fair.   Judgment: Fair.     Medical Records/Labs/Diagnostic Tests:  No  records found within the last 3 years, recent relevant provider encounters reviewed, recent relevant labs in record reviewed     Strengths/Assets:  Patient strengths identified included intelligence, resilience, evidence of good judgment, self-awareness, social support, sense of humor, social skills     Impression:  Persistent depressive disorder (dysthymia)   Anxiety disorder, unspecified   Cannabis dependence    Borderline and avoidant personality disorder traits - chronic feelings of emptiness, problems with self worth and confidence, unstable relationship, fear of abandonment, history of self harm behaviors, fear of criticism and rejection.     Plan:  1. Medication options, alternatives (including no medications) and medication risks/benefits/side effects were discussed in detail.   2. Continue Effexor XR to 225mg po q am for continued depression/anxiety.   3. Continue Buspar 10mg q " day for continued anxiety.   4. Continue weekly psychotherapy with ANNE Powers.  5. The patient was advised to call, message provider on MyChart, or come in to the clinic if symptoms worsen or if any future questions/issues regarding their medications arise; the patient verbalized understanding and agreement.    6. The patient was educated to call 911, call the suicide hotline, or go to local ER if having thoughts of suicide or homicide; verbalized understanding.    Return to clinic in *** or sooner if symptoms worsen    The proposed treatment plan was discussed with the patient who was provided the opportunity to ask questions and make suggestions regarding alternative treatment. Patient verbalized understanding and expressed agreement with the plan.     Total face-to-face time: *** minutes with more than 50% of face-to-face time spent in counseling and coordinating care as stated in the above plan.     Adán Flanagan A.P.R.N.  06/17/19    This note was created using voice recognition software (Dragon). The accuracy of the dictation is limited by the abilities of the software. I have reviewed the note prior to signing, however some errors in grammar and context are still possible. If you have any questions related to this note please do not hesitate to contact our office.

## 2019-06-18 ENCOUNTER — APPOINTMENT (OUTPATIENT)
Dept: BEHAVIORAL HEALTH | Facility: CLINIC | Age: 29
End: 2019-06-18

## 2019-07-08 NOTE — PROGRESS NOTES
"PSYCHIATRY FOLLOW-UP NOTE    Chief Complaint:    \"I've been alright I guess.\"    History Of Present Illness:  Justice Infante is a 28 y.o. male with Persistent depressive disorder, anxiety disorder, unspecified, and cannabis dependence comes in today for follow up, was last seen in this office by this provider on 5.20.19.      The patient has not noticed feeling depressed lately. He feels this has been under control for He has been eating healthier and less, and has a notable 5lbs decrease in his weight.  The last 3-5 he has been having trouble sleeping due to \"my brain won't shut off.\"  His energy has been low, but does note he has been camping a lot lately on the weekends, which has been draining for him. The patient denies suicidal ideations, passive or active.      His anxiety is \"tolerable\" and feels like it is controlled lately. He denies irritability and panic attacks lately. He feels like his anxiety is has been keeping him up the last few days.  His social anxiety is controlled lately when taking an \"anxiety vicky\" with him lately.  He continues to use marijuana daily to help with anxiety, and remains ambivalent to quit at this time. He continues to see a weekly therapist, as he has for the last few years, which is helpful to him.      The patient denies symptoms of hypomania, psychosis and homicidal ideations. He occasionally still has mild night sweats since increasing Effexor, but this is manageable and he wishes to continue it.  He denies needing a sleep aid at this time.      Current Psychotropic Medications:  Effexor XR 225mg po q am  Buspar 10mg q day     Past Psychotropic Medication Trials:  Prozac: maxed out and was not working anymore   Lithium: weight gain  Trazodone  Wellbutrin: no longer effective  Zoloft: no longer effective  Seroquel  Ativan      Social History:   He is a single homosexual man.  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 He continues to be " "dissatisfied with his current job.  He lives alone in an apartment. His grandmother is his biggest support system.  He recently went camping for the last few weekends, which was fun.      Substance Use:  Alcohol: Social drinking, drinks once a month.   Nicotine: Denies   Illicit drugs: marijuana daily for 2 years   Caffeine: 2 cups/day    Medications:  Current Outpatient Prescriptions   Medication Sig Dispense Refill   • venlafaxine ER (EFFEXOR) 225 MG TABLET SR 24 HR tablet Take 1 Tab by mouth every day. 90 Tab 0   • busPIRone (BUSPAR) 10 MG Tab tablet Take 1 Tab by mouth every day. 90 Tab 0   • emtricitabine-tenofovir (TRUVADA) 200-300 MG per tablet Take 1 Tab by mouth every day. 90 Tab 3     No current facility-administered medications for this visit.      Depression Screening (PHQ-9 Score):  Depression Screen (PHQ-2/PHQ-9) 7/13/2016 1/26/2017 5/6/2019   PHQ-2 Total Score - - -   PHQ-2 Total Score 2 0 2   PHQ-9 Total Score - - 7     Interpretation of PHQ-9 Total Score   Score Severity   1-4 No Depression   5-9 Mild Depression   10-14 Moderate Depression   15-19 Moderately Severe Depression   20-27 Severe Depression    Physical Examination:  140/90, 70, 16, 257lbs, 6'1\"     Review of Symptoms:  Constitutional: denies recent chills, fevers.  Overweight.   Neuro: denies recent weakness, numbness, or headaches.   HEENT: denies recent nasal congestion or sore throat.  Cardiovascular: hx of dyslipidemia.   Respiratory: denies recent shortness of breath, dyspnea, or cough.  GI: denies recent nausea, vomiting, or diarrhea.  : denies recent urinary urgency frequency or urgency.  Musculoskeletal: age-appropriate gait and station, good balance. No abnormal movements noted.   Skin: denies recent rash or skin lesions.   Endocrine: denies recent cold and heat intolerance, denies excessive thirst.   Hematologic: denies recent abnormal bleeding and bruising easily.  Psychiatric: Positive for symptoms of depression and " "anxiety      Mental Status Examination:   Appearance: 28 y.o.  male, tall, earrings, dressed in casual attire, neat   Behavior: cooperative, good eye contact, no psychomotor agitation or retardation noted  Participation: active verbal participation, engaged  Speech: spontaneous, regular rate, rhythm, and volume noted.  Language appropriate.  Mood: \"alright.\"  Affect: anxious and mood congruent  Orientation: alert and oriented to person, place, situation, and time.  Attention/concentration: Fair.  Associations/Abstract reasoning:Adequate.   Thought Process: linear, logical, and goal-directed  Thought Content: denies passive/active suicidal or homicidal ideations, intent, or plan  Perception: denies auditory or visual hallucinations, no delusions noted  Fund of knowledge and vocabulary: Adequate.  Memory: No gross evidence of memory deficits  Insight: Fair.   Judgment: Fair.     Medical Records/Labs/Diagnostic Tests:  No  records found within the last 3 years, recent relevant provider encounters reviewed, recent relevant labs in record reviewed    1/19: CBC WNL    Strengths/Assets:  Patient strengths identified included intelligence, resilience, evidence of good judgment, self-awareness, social support, sense of humor, social skills     Impression:  Persistent depressive disorder (dysthymia)   Anxiety disorder, unspecified   Cannabis dependence    Borderline and avoidant personality disorder traits - chronic feelings of emptiness, problems with self worth and confidence, unstable relationship, fear of abandonment, history of self harm behaviors, fear of criticism and rejection.     Plan:  1. Medication options, alternatives (including no medications) and medication risks/benefits/side effects were discussed in detail.   2. Continue Effexor XR to 225mg po q am for stable depression/anxiety.   3. Continue Buspar 10mg q day for stable anxiety.   4. Continue weekly psychotherapy with ANNE Powers.  5. The " patient was advised to call, message provider on "[employer]"hart, or come in to the clinic if symptoms worsen or if any future questions/issues regarding their medications arise; the patient verbalized understanding and agreement.    6. The patient was educated to call 911, call the suicide hotline, or go to local ER if having thoughts of suicide or homicide; verbalized understanding.    Return to clinic in 4 weeks (per his request) or sooner if symptoms worsen    The proposed treatment plan was discussed with the patient who was provided the opportunity to ask questions and make suggestions regarding alternative treatment. Patient verbalized understanding and expressed agreement with the plan.     Total face-to-face time: 30 minutes with more than 50% of face-to-face time spent in counseling and coordinating care as stated in the above plan.     Adán Flanagan A.P.R.N.  07/08/19    This note was created using voice recognition software (Dragon). The accuracy of the dictation is limited by the abilities of the software. I have reviewed the note prior to signing, however some errors in grammar and context are still possible. If you have any questions related to this note please do not hesitate to contact our office.

## 2019-07-09 ENCOUNTER — OFFICE VISIT (OUTPATIENT)
Dept: BEHAVIORAL HEALTH | Facility: CLINIC | Age: 29
End: 2019-07-09
Payer: COMMERCIAL

## 2019-07-09 VITALS
DIASTOLIC BLOOD PRESSURE: 90 MMHG | HEART RATE: 70 BPM | HEIGHT: 73 IN | BODY MASS INDEX: 34.06 KG/M2 | SYSTOLIC BLOOD PRESSURE: 140 MMHG | WEIGHT: 257 LBS

## 2019-07-09 DIAGNOSIS — F41.9 ANXIETY DISORDER, UNSPECIFIED TYPE: ICD-10-CM

## 2019-07-09 DIAGNOSIS — F34.1 DYSTHYMIA: ICD-10-CM

## 2019-07-09 PROCEDURE — 99213 OFFICE O/P EST LOW 20 MIN: CPT | Performed by: NURSE PRACTITIONER

## 2019-07-10 ENCOUNTER — OFFICE VISIT (OUTPATIENT)
Dept: MEDICAL GROUP | Facility: MEDICAL CENTER | Age: 29
End: 2019-07-10
Payer: COMMERCIAL

## 2019-07-10 ENCOUNTER — HOSPITAL ENCOUNTER (OUTPATIENT)
Dept: LAB | Facility: MEDICAL CENTER | Age: 29
End: 2019-07-10
Attending: INTERNAL MEDICINE
Payer: COMMERCIAL

## 2019-07-10 VITALS
SYSTOLIC BLOOD PRESSURE: 130 MMHG | HEART RATE: 73 BPM | TEMPERATURE: 97.2 F | HEIGHT: 73 IN | BODY MASS INDEX: 34.19 KG/M2 | DIASTOLIC BLOOD PRESSURE: 80 MMHG | WEIGHT: 258 LBS | OXYGEN SATURATION: 98 %

## 2019-07-10 DIAGNOSIS — F33.1 MODERATE EPISODE OF RECURRENT MAJOR DEPRESSIVE DISORDER (HCC): ICD-10-CM

## 2019-07-10 DIAGNOSIS — R53.83 FATIGUE, UNSPECIFIED TYPE: ICD-10-CM

## 2019-07-10 DIAGNOSIS — Z11.3 SCREEN FOR STD (SEXUALLY TRANSMITTED DISEASE): ICD-10-CM

## 2019-07-10 DIAGNOSIS — F41.9 ANXIETY DISORDER, UNSPECIFIED TYPE: ICD-10-CM

## 2019-07-10 DIAGNOSIS — E66.9 OBESITY (BMI 30.0-34.9): ICD-10-CM

## 2019-07-10 LAB
HIV 1+2 AB+HIV1 P24 AG SERPL QL IA: NON REACTIVE
TREPONEMA PALLIDUM IGG+IGM AB [PRESENCE] IN SERUM OR PLASMA BY IMMUNOASSAY: NON REACTIVE
TSH SERPL DL<=0.005 MIU/L-ACNC: 1.98 UIU/ML (ref 0.38–5.33)

## 2019-07-10 PROCEDURE — 36415 COLL VENOUS BLD VENIPUNCTURE: CPT

## 2019-07-10 PROCEDURE — 99214 OFFICE O/P EST MOD 30 MIN: CPT | Performed by: INTERNAL MEDICINE

## 2019-07-10 PROCEDURE — 87591 N.GONORRHOEAE DNA AMP PROB: CPT

## 2019-07-10 PROCEDURE — 86780 TREPONEMA PALLIDUM: CPT

## 2019-07-10 PROCEDURE — 84443 ASSAY THYROID STIM HORMONE: CPT

## 2019-07-10 PROCEDURE — 87389 HIV-1 AG W/HIV-1&-2 AB AG IA: CPT

## 2019-07-10 PROCEDURE — 87491 CHLMYD TRACH DNA AMP PROBE: CPT

## 2019-07-11 NOTE — PROGRESS NOTES
CC: Follow-up anxiety depression complaining of requesting STD testing.                                                                                                                                      HPI:   Justice presents today with the following.    1. Moderate episode of recurrent major depressive disorder (HCC)  Reports his depression is doing fairly well he was prompted by psychiatrist to come in to check his thyroid.  Denies any suicidal ideation.    2. Anxiety disorder, unspecified type  He is anxious about job decisions and we are past he is trying to come to terms of what he wants to do in life.  In general his anxiety is doing well on his current medications.    3. Fatigue, unspecified type  Reports some fatigue and difficulty losing weight again requesting thyroid testing.  No blood in stool or dark tarry stools.    4. Obesity (BMI 30.0-34.9)  Is made efforts to lose weight he is currently on a diet he is having some success.    5. Screen for STD (sexually transmitted disease)        Patient Active Problem List    Diagnosis Date Noted   • Moderate episode of recurrent major depressive disorder (HCC) 04/10/2019   • Dyslipidemia 08/29/2018   • Obesity (BMI 30.0-34.9) 10/12/2017   • Dysthymia 02/17/2017   • OCD (obsessive compulsive disorder) 07/13/2016   • Anxiety disorder, unspecified 06/05/2015       Current Outpatient Prescriptions   Medication Sig Dispense Refill   • venlafaxine ER (EFFEXOR) 225 MG TABLET SR 24 HR tablet Take 1 Tab by mouth every day. 90 Tab 0   • busPIRone (BUSPAR) 10 MG Tab tablet Take 1 Tab by mouth every day. 90 Tab 0   • emtricitabine-tenofovir (TRUVADA) 200-300 MG per tablet Take 1 Tab by mouth every day. 90 Tab 3     No current facility-administered medications for this visit.          Allergies as of 07/10/2019   • (No Known Allergies)        ROS: Denies Chest pain, SOB, LE edema.    /80 (BP Location: Right arm, Patient Position: Sitting)   Pulse 73   Temp  "36.2 °C (97.2 °F)   Ht 1.854 m (6' 1\")   Wt 117 kg (258 lb)   SpO2 98%   BMI 34.04 kg/m²     Physical Exam:  Gen:         Alert and oriented, No apparent distress.  Neck:        No Lymphadenopathy or Bruits.  Lungs:     Clear to auscultation bilaterally  CV:          Regular rate and rhythm. No murmurs, rubs or gallops.               Ext:          No clubbing, cyanosis, edema.      Assessment and Plan.   29 y.o. male with the following issues.    1. Moderate episode of recurrent major depressive disorder (HCC)  Clinically doing well no change to therapy    2. Anxiety disorder, unspecified type  Table follow along with psychiatry    3. Fatigue, unspecified type  Have sent for thyroid testing  - TSH; Future    4. Obesity (BMI 30.0-34.9)  Encouragement for weight loss as well as calorie count  - Patient identified as having weight management issue.  Appropriate orders and counseling given.    5. Screen for STD (sexually transmitted disease)    - Chlamydia/GC PCR Urine Or Swab; Future  - HIV AG/AB COMBO ASSAY SCREENING; Future  - T.PALLIDUM AB EIA; Future      "

## 2019-08-12 ENCOUNTER — APPOINTMENT (OUTPATIENT)
Dept: BEHAVIORAL HEALTH | Facility: CLINIC | Age: 29
End: 2019-08-12

## 2019-09-04 RX ORDER — VENLAFAXINE HYDROCHLORIDE 225 MG/1
TABLET, EXTENDED RELEASE ORAL
Qty: 30 TAB | Refills: 0 | Status: SHIPPED | OUTPATIENT
Start: 2019-09-04 | End: 2019-09-16

## 2019-09-10 NOTE — PROGRESS NOTES
"PSYCHIATRY FOLLOW-UP NOTE    Chief Complaint:    \"***\"    History Of Present Illness:  Justice Infante is a 28 y.o. male with Persistent depressive disorder, anxiety disorder, unspecified, and cannabis dependence comes in today for follow up, was last seen in this office by this provider on 5.20.19.      The patient has not noticed feeling depressed lately. He feels this has been under control for He has been eating healthier and less, and has a notable 5lbs decrease in his weight.  The last 3-5 he has been having trouble sleeping due to \"my brain won't shut off.\"  His energy has been low, but does note he has been camping a lot lately on the weekends, which has been draining for him. The patient denies suicidal ideations, passive or active.      His anxiety is \"tolerable\" and feels like it is controlled lately. He denies irritability and panic attacks lately. He feels like his anxiety is has been keeping him up the last few days.  His social anxiety is controlled lately when taking an \"anxiety vicky\" with him lately.  He continues to use marijuana daily to help with anxiety, and remains ambivalent to quit at this time. He continues to see a weekly therapist, as he has for the last few years, which is helpful to him.       The patient denies symptoms of hypomania, psychosis and homicidal ideations. He occasionally still has mild night sweats since increasing Effexor, but this is manageable and he wishes to continue it.  He denies needing a sleep aid at this time.      Current Psychotropic Medications:  Effexor XR 225mg po q am  Buspar 10mg q day     Past Psychotropic Medication Trials:  Prozac: maxed out and was not working anymore   Lithium: weight gain  Trazodone  Wellbutrin: no longer effective  Zoloft: no longer effective  Seroquel  Ativan      Social History:   He is a single homosexual man.  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 He continues to be dissatisfied with his " "current job.  He lives alone in an apartment. His grandmother is his biggest support system.  He recently went camping for the last few weekends, which was fun.      Substance Use:  Alcohol: Social drinking, drinks once a month.   Nicotine: Denies   Illicit drugs: marijuana daily for 2 years   Caffeine: 2 cups/day    Medications:  Current Outpatient Medications   Medication Sig Dispense Refill   • venlafaxine ER (EFFEXOR) 225 MG TABLET SR 24 HR tablet TAKE 1 TABLET BY MOUTH ONCE DAILY 30 Tab 0   • busPIRone (BUSPAR) 10 MG Tab tablet Take 1 Tab by mouth every day. 90 Tab 0   • emtricitabine-tenofovir (TRUVADA) 200-300 MG per tablet Take 1 Tab by mouth every day. 90 Tab 3     No current facility-administered medications for this visit.      Depression Screening (PHQ-9 Score): ***  Depression Screen (PHQ-2/PHQ-9) 7/13/2016 1/26/2017 5/6/2019   PHQ-2 Total Score - - -   PHQ-2 Total Score 2 0 2   PHQ-9 Total Score - - 7     Interpretation of PHQ-9 Total Score   Score Severity   1-4 No Depression   5-9 Mild Depression   10-14 Moderate Depression   15-19 Moderately Severe Depression   20-27 Severe Depression    Physical Examination:  140/90, 70, 16, 257lbs, 6'1\" ***     Review of Symptoms:  Constitutional: denies recent chills, fevers.  Overweight.   Neuro: denies recent weakness, numbness, or headaches.   HEENT: denies recent nasal congestion or sore throat.  Cardiovascular: hx of dyslipidemia.   Respiratory: denies recent shortness of breath, dyspnea, or cough.  GI: denies recent nausea, vomiting, or diarrhea.  : denies recent urinary urgency frequency or urgency.  Musculoskeletal: age-appropriate gait and station, good balance. No abnormal movements noted.   Skin: denies recent rash or skin lesions.   Endocrine: denies recent cold and heat intolerance, denies excessive thirst.   Hematologic: denies recent abnormal bleeding and bruising easily.  Psychiatric: Positive for symptoms of depression and anxiety      Mental " "Status Examination:   Appearance: 28 y.o.  male, tall, earrings, dressed in casual attire, neat   Behavior: cooperative, good eye contact, no psychomotor agitation or retardation noted  Participation: active verbal participation, engaged  Speech: spontaneous, regular rate, rhythm, and volume noted.  Language appropriate.  Mood: \"alright.\"  Affect: anxious and mood congruent  Orientation: alert and oriented to person, place, situation, and time.  Attention/concentration: Fair.  Associations/Abstract reasoning:Adequate.   Thought Process: linear, logical, and goal-directed  Thought Content: denies passive/active suicidal or homicidal ideations, intent, or plan  Perception: denies auditory or visual hallucinations, no delusions noted  Fund of knowledge and vocabulary: Adequate.  Memory: No gross evidence of memory deficits  Insight: Fair.   Judgment: Fair.     Medical Records/Labs/Diagnostic Tests:  No  records found within the last 3 years, recent relevant provider encounters reviewed, recent relevant labs in record reviewed     1/19: CBC WNL     Strengths/Assets:  Patient strengths identified included intelligence, resilience, evidence of good judgment, self-awareness, social support, sense of humor, social skills     Impression:  Persistent depressive disorder (dysthymia)   Anxiety disorder, unspecified   Cannabis dependence     Borderline and avoidant personality disorder traits - chronic feelings of emptiness, problems with self worth and confidence, unstable relationship, fear of abandonment, history of self harm behaviors, fear of criticism and rejection.     Plan:  1. Medication options, alternatives (including no medications) and medication risks/benefits/side effects were discussed in detail.   2. Continue Effexor XR to 225mg po q am for stable depression/anxiety.   3. Continue Buspar 10mg q day for stable anxiety.   4. Continue weekly psychotherapy with ANNE Powers.  5. The patient was " advised to call, message provider on [employer], or come in to the clinic if symptoms worsen or if any future questions/issues regarding their medications arise; the patient verbalized understanding and agreement.    6. The patient was educated to call 911, call the suicide hotline, or go to local ER if having thoughts of suicide or homicide; verbalized understanding.    Return to clinic in *** or sooner if symptoms worsen    The proposed treatment plan was discussed with the patient who was provided the opportunity to ask questions and make suggestions regarding alternative treatment. Patient verbalized understanding and expressed agreement with the plan.     Total face-to-face time: *** minutes with more than 50% of face-to-face time spent in counseling and coordinating care as stated in the above plan. {nypsychotherapy:81523}    Adán Flanagan A.P.R.N.  09/09/19    This note was created using voice recognition software (Dragon). The accuracy of the dictation is limited by the abilities of the software. I have reviewed the note prior to signing, however some errors in grammar and context are still possible. If you have any questions related to this note please do not hesitate to contact our office.

## 2019-09-11 ENCOUNTER — APPOINTMENT (OUTPATIENT)
Dept: BEHAVIORAL HEALTH | Facility: CLINIC | Age: 29
End: 2019-09-11

## 2019-09-28 ENCOUNTER — OFFICE VISIT (OUTPATIENT)
Dept: URGENT CARE | Facility: PHYSICIAN GROUP | Age: 29
End: 2019-09-28
Payer: COMMERCIAL

## 2019-09-28 VITALS
HEART RATE: 80 BPM | BODY MASS INDEX: 34.19 KG/M2 | DIASTOLIC BLOOD PRESSURE: 80 MMHG | SYSTOLIC BLOOD PRESSURE: 126 MMHG | RESPIRATION RATE: 16 BRPM | OXYGEN SATURATION: 96 % | HEIGHT: 73 IN | WEIGHT: 258 LBS | TEMPERATURE: 97.8 F

## 2019-09-28 DIAGNOSIS — L03.316 CELLULITIS OF UMBILICUS: Primary | ICD-10-CM

## 2019-09-28 PROCEDURE — 99214 OFFICE O/P EST MOD 30 MIN: CPT | Performed by: NURSE PRACTITIONER

## 2019-09-28 RX ORDER — CEPHALEXIN 500 MG/1
500 CAPSULE ORAL 4 TIMES DAILY
Qty: 28 CAP | Refills: 0 | Status: SHIPPED | OUTPATIENT
Start: 2019-09-28 | End: 2019-10-05

## 2019-09-28 ASSESSMENT — ENCOUNTER SYMPTOMS
NAUSEA: 0
CONSTIPATION: 0
DIARRHEA: 0
GASTROINTESTINAL NEGATIVE: 1
VOMITING: 0
FEVER: 0
CONSTITUTIONAL NEGATIVE: 1
ABDOMINAL PAIN: 0

## 2019-09-30 NOTE — PROGRESS NOTES
"PSYCHIATRY FOLLOW-UP NOTE    Chief Complaint:    \"My anxiety is through the roof\"    History Of Present Illness:  Justice Infante is a 29 y.o. male with Persistent depressive disorder, anxiety disorder, unspecified, and cannabis dependence comes in today for follow up, was last seen in this office by this provider on 2019.      The patient notes that he does not do good with change, and lately he has had a lot.  Within the last few months, he has changed jobs and is now a  at his same company which comes with a decreased pay cut.  He has also gotten a new cat which he frequently worries about.  He has enrolled in school, will be starting a bachelors in business management in November through .      With all these changes, his anxiety has increased.  He is frequently worrying and having trouble controlling the worry.  He is often irritable and restless.  He has had one panic attack since her last visit.  His therapist has been out of town as her father has  and he has not been able to see her for several weeks.  Historically, going to see her every week has helped control anxiety.  He is no longer smoking marijuana, as he feels this does not help his anxiety as it wants to.  He also requests to get off the buspirone today as he feels this has not helped his anxiety in any way.    The patient has been feeling increasingly depressed for the past 3 weeks.  He verbalizes anhedonia.  He states \"I have nothing left to give\" and \"nothing appeals to me anymore.\"  His appetite is been decreased and he has had a several pound weight loss since his last visit with this provider.  He does not find pleasure and previously pleasurable things.  He feels worthless.  His sleep has suffered, not able to get to sleep and only sleeping a few hours at a time when he does get to sleep.  He denies suicidal ideations, passive or active at this time.    The patient denies symptoms of " "hypomania, psychosis and homicidal ideations. He denies side effects from his current medication regimen.  He requests a change in his psychotropic medications to better control his depression and anxiety at this time.     Current Psychotropic Medications:  Effexor  mg po q am  Buspar 10mg q day      Past Psychotropic Medication Trials:  Prozac: maxed out and was not working anymore   Lithium: weight gain  Trazodone: could not wake up  Wellbutrin: no longer effective  Zoloft: no longer effective  Seroquel  Ativan      Social History:   He is a single homosexual man.  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 He continues to be dissatisfied with his current job.  He lives alone in an apartment. His grandmother is his biggest support system.      Substance Use:  Alcohol: Social drinking, drinks once a month.   Nicotine: Denies   Illicit drugs: marijuana daily for 2 years   Caffeine: 2 cups/day      Depression Screening (PHQ-9 Score):  Depression Screen (PHQ-2/PHQ-9) 7/13/2016 1/26/2017 5/6/2019   PHQ-2 Total Score - - -   PHQ-2 Total Score 2 0 2   PHQ-9 Total Score - - 7      Interpretation of PHQ-9 Total Score   Score Severity   1-4 No Depression   5-9 Mild Depression   10-14 Moderate Depression   15-19 Moderately Severe Depression   20-27 Severe Depression     Physical Examination:   10/1/19 5:06 PM      BP  136/92     Pulse  91     Resp  14     Weight   112 kg (247 lb)     Height  1.854 m (6' 1\")       Review of Symptoms:  Constitutional: denies recent chills, fevers.  Overweight.   Neuro: denies recent weakness, numbness, or headaches.   HEENT: denies recent nasal congestion or sore throat.  Cardiovascular: hx of dyslipidemia.   Respiratory: denies recent shortness of breath, dyspnea, or cough.  GI: denies recent nausea, vomiting, or diarrhea.  Recent stomach flu 2 weeks ago.  : denies recent urinary urgency frequency or urgency.  Musculoskeletal: age-appropriate gait and station, " "good balance. No abnormal movements noted.   Skin: denies recent rash or skin lesions. Umbilicus sore currently - on abx.  Endocrine: denies recent cold and heat intolerance, denies excessive thirst.   Hematologic: denies recent abnormal bleeding and bruising easily.  Psychiatric: See HPI     Mental Status Examination:   Appearance: 29 y.o.  male, tall, earrings, dressed in casual attire, neat   Behavior: cooperative, good eye contact, no psychomotor agitation or retardation noted  Participation: active verbal participation, engaged  Speech: spontaneous, regular rate, rhythm, and volume noted.  Language appropriate.  Mood: \"anxious.\"  Affect: anxious and mood congruent  Orientation: alert and oriented to person, place, situation, and time.  Attention/concentration: Fair.  Associations/Abstract reasoning:Adequate.   Thought Process: linear, logical, and goal-directed  Thought Content: denies passive/active suicidal or homicidal ideations, intent, or plan  Perception: denies auditory or visual hallucinations, no delusions noted  Fund of knowledge and vocabulary: Adequate.  Memory: No gross evidence of memory deficits  Insight: Fair.   Judgment: Fair.     Medical Records/Labs/Diagnostic Tests:  No  records found within the last 3 years, recent relevant provider encounters reviewed, recent relevant labs in record reviewed, medications reviewed.     1/19: CBC WNL    Results for NGUYEN RUTH (MRN [id number]) as of 10/1/2019 17:07   Ref. Range 7/10/2019 17:30   TSH Latest Ref Range: 0.380 - 5.330 uIU/mL 1.980     Results for NGUYEN RUTH (MRN [id number]) as of 10/1/2019 17:07   Ref. Range 1/30/2019 17:43   Sodium Latest Ref Range: 135 - 145 mmol/L 136   Potassium Latest Ref Range: 3.6 - 5.5 mmol/L 4.1   Chloride Latest Ref Range: 96 - 112 mmol/L 106      Results for NGUYEN RUTH (MRN [id number]) as of 10/1/2019 17:07   Ref. Range 9/8/2018 08:21   Cholesterol,Tot Latest Ref " Range: 100 - 199 mg/dL 137   Triglycerides Latest Ref Range: 0 - 149 mg/dL 172 (H)   HDL Latest Ref Range: >=40 mg/dL 31 (A)   LDL Latest Ref Range: <100 mg/dL 72         Strengths/Assets:  Patient strengths identified included intelligence, resilience, evidence of good judgment, self-awareness, social support, sense of humor, social skills     Impression:  MDD, moderate, recurrent  GILBERTO     Borderline and avoidant personality disorder traits - chronic feelings of emptiness, problems with self worth and confidence, unstable relationship, fear of abandonment, history of self harm behaviors, fear of criticism and rejection.       Plan:  1. Medication options, alternatives (including no medications) and medication risks/benefits/side effects were discussed in detail.   2. Discontinue Buspar as he does not find beneficial.  3. Continue Effexor XR to 225 mg po q am for  worsening depression/anxiety.   4. Start Remeron 7.5mg po q HS for worsening depression/anxiety. Common side effects of mirtazepine were discussed and included sedation, constipation, drowsiness, increased serum cholesterol, weight gain, fatigue, insomnia, increased appetite, and xerostomia.   5. Continue weekly psychotherapy with ANNE Powers when able.   6. Vitamin D ordered per patient request.  Draw when able.  7. The patient was advised to call, message provider on [employer], or come in to the clinic if symptoms worsen or if any future questions/issues regarding their medications arise; the patient verbalized understanding and agreement.    8. The patient was educated to call 911, call the suicide hotline, or go to local ER if having thoughts of suicide or homicide; verbalized understanding.     Return to clinic in 3-4 weeks or sooner if symptoms worsen     The proposed treatment plan was discussed with the patient who was provided the opportunity to ask questions and make suggestions regarding alternative treatment. Patient verbalized understanding  and expressed agreement with the plan.       Total face-to-face time: 45 minutes with more than 50% of face-to-face time spent in counseling and coordinating care as stated in the above plan. 17 minutes were spent in psychotherapy including cognitive restructuring and behavioral changes, sleep-hygiene techniques, psychoeducation regarding medications, negative automatic thoughts and how to process them better    REJI Elise.    This note was created using voice recognition software (Dragon). The accuracy of the dictation is limited by the abilities of the software. I have reviewed the note prior to signing, however some errors in grammar and context are still possible. If you have any questions related to this note please do not hesitate to contact our office.

## 2019-10-01 ENCOUNTER — OFFICE VISIT (OUTPATIENT)
Dept: BEHAVIORAL HEALTH | Facility: CLINIC | Age: 29
End: 2019-10-01
Payer: COMMERCIAL

## 2019-10-01 VITALS
RESPIRATION RATE: 14 BRPM | WEIGHT: 247 LBS | BODY MASS INDEX: 32.74 KG/M2 | HEART RATE: 91 BPM | DIASTOLIC BLOOD PRESSURE: 92 MMHG | SYSTOLIC BLOOD PRESSURE: 136 MMHG | HEIGHT: 73 IN

## 2019-10-01 DIAGNOSIS — F41.1 GAD (GENERALIZED ANXIETY DISORDER): ICD-10-CM

## 2019-10-01 DIAGNOSIS — F33.1 MODERATE EPISODE OF RECURRENT MAJOR DEPRESSIVE DISORDER (HCC): ICD-10-CM

## 2019-10-01 PROCEDURE — 99214 OFFICE O/P EST MOD 30 MIN: CPT | Performed by: NURSE PRACTITIONER

## 2019-10-01 PROCEDURE — 90833 PSYTX W PT W E/M 30 MIN: CPT | Performed by: NURSE PRACTITIONER

## 2019-10-01 RX ORDER — VENLAFAXINE HYDROCHLORIDE 225 MG/1
225 TABLET, EXTENDED RELEASE ORAL DAILY
Qty: 90 TAB | Refills: 0 | Status: SHIPPED | OUTPATIENT
Start: 2019-10-01 | End: 2019-12-03 | Stop reason: SDUPTHER

## 2019-10-01 RX ORDER — MIRTAZAPINE 7.5 MG/1
7.5 TABLET, FILM COATED ORAL EVERY EVENING
Qty: 30 TAB | Refills: 1 | Status: SHIPPED | OUTPATIENT
Start: 2019-10-01 | End: 2019-11-05

## 2019-10-02 ENCOUNTER — OFFICE VISIT (OUTPATIENT)
Dept: MEDICAL GROUP | Facility: MEDICAL CENTER | Age: 29
End: 2019-10-02
Payer: COMMERCIAL

## 2019-10-02 VITALS
BODY MASS INDEX: 32.34 KG/M2 | HEIGHT: 73 IN | HEART RATE: 73 BPM | TEMPERATURE: 97.1 F | SYSTOLIC BLOOD PRESSURE: 116 MMHG | DIASTOLIC BLOOD PRESSURE: 66 MMHG | WEIGHT: 244 LBS | OXYGEN SATURATION: 100 %

## 2019-10-02 DIAGNOSIS — Z11.3 SCREEN FOR STD (SEXUALLY TRANSMITTED DISEASE): ICD-10-CM

## 2019-10-02 DIAGNOSIS — Z13.6 SCREENING FOR CARDIOVASCULAR CONDITION: ICD-10-CM

## 2019-10-02 DIAGNOSIS — Z00.00 WELLNESS EXAMINATION: ICD-10-CM

## 2019-10-02 DIAGNOSIS — Z23 NEED FOR VACCINATION: ICD-10-CM

## 2019-10-02 DIAGNOSIS — K64.4 SKIN TAG OF ANUS: ICD-10-CM

## 2019-10-02 PROCEDURE — 90686 IIV4 VACC NO PRSV 0.5 ML IM: CPT | Performed by: INTERNAL MEDICINE

## 2019-10-02 PROCEDURE — 90471 IMMUNIZATION ADMIN: CPT | Performed by: INTERNAL MEDICINE

## 2019-10-02 PROCEDURE — 99395 PREV VISIT EST AGE 18-39: CPT | Mod: 25 | Performed by: INTERNAL MEDICINE

## 2019-10-02 NOTE — PROGRESS NOTES
"      CC: Wellness examination                                                                                                                                      HPI:   Justice presents today with the following.    1. Wellness examination  Presents reporting all in all he is doing well from a physical standpoint he does have a skin tag that was recommended for him to be removed would like to be referred back to surgeon.  Denies any other major physical complaints today is coming repeat blood work.        Patient Active Problem List    Diagnosis Date Noted   • Moderate episode of recurrent major depressive disorder (HCC) 04/10/2019   • Dyslipidemia 08/29/2018   • Obesity (BMI 30.0-34.9) 10/12/2017   • Dysthymia 02/17/2017   • OCD (obsessive compulsive disorder) 07/13/2016   • Anxiety disorder, unspecified 06/05/2015       Current Outpatient Medications   Medication Sig Dispense Refill   • mirtazapine (REMERON) 7.5 MG tablet Take 1 Tab by mouth every evening. 30 Tab 1   • venlafaxine ER (EFFEXOR) 225 MG TABLET SR 24 HR tablet Take 1 Tab by mouth every day for 90 days. 90 Tab 0   • cephALEXin (KEFLEX) 500 MG Cap Take 1 Cap by mouth 4 times a day for 7 days. 28 Cap 0   • emtricitabine-tenofovir (TRUVADA) 200-300 MG per tablet Take 1 Tab by mouth every day. 90 Tab 3     No current facility-administered medications for this visit.          Allergies as of 10/02/2019   • (No Known Allergies)        ROS: Denies Chest pain, SOB, LE edema.    /66 (BP Location: Right arm, Patient Position: Sitting)   Pulse 73   Temp 36.2 °C (97.1 °F)   Ht 1.854 m (6' 1\")   Wt 110.7 kg (244 lb)   SpO2 100%   BMI 32.19 kg/m²     Physical Exam:  Gen:         Alert and oriented, No apparent distress.  Neck:        No Lymphadenopathy or Bruits.  Lungs:     Clear to auscultation bilaterally  CV:          Regular rate and rhythm. No murmurs, rubs or gallops.               Ext:          No clubbing, cyanosis, edema.      Assessment and " Plan.   29 y.o. male with the following issues.    1. Wellness examination  Discussed healthy lifestyle habits as well as screening regimens.    2. Skin tag of anus  Place referral back to the surgeon he saw previously.  - REFERRAL TO GENERAL SURGERY    3. Need for vaccination    - Influenza Vaccine Quad Injection (PF)    4. Screening for cardiovascular condition    - Comp Metabolic Panel; Future  - Lipid Profile; Future    5. Screen for STD (sexually transmitted disease)  - Chlamydia/GC PCR Urine Or Swab; Future  - HIV AG/AB COMBO ASSAY SCREENING; Future  - T.PALLIDUM AB EIA; Future

## 2019-10-23 NOTE — PROGRESS NOTES
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      The patient notes that he does not do good with change, and lately he has had a lot.  Within the last few months, he has changed jobs and is now a  at his same company which comes with a decreased pay cut.  He has also gotten a new cat which he frequently worries about.  He has enrolled in school, will be starting a bachelors in business management in November through .       With all these changes, his anxiety has increased.  He is frequently worrying and having trouble controlling the worry.  He is often irritable and restless.  He has had one panic attack since her last visit.  His therapist has been out of town as her father has  and he has not been able to see her for several weeks.  Historically, going to see her every week has helped control anxiety.  He is no longer smoking marijuana, as he feels this does not help his anxiety as it wants to.  He also requests to get off the buspirone today as he feels this has not helped his anxiety in any way.     The patient has been feeling increasingly depressed for the past 3 weeks.  He verbalizes anhedonia.  He states \"I have nothing left to give\" and \"nothing appeals to me anymore.\"  His appetite is been decreased and he has had a several pound weight loss since his last visit with this provider.  He does not find pleasure and previously pleasurable things.  He feels worthless.  His sleep has suffered, not able to get to sleep and only sleeping a few hours at a time when he does get to sleep.  He denies suicidal ideations, passive or active at this time.     The patient denies symptoms of hypomania, psychosis and homicidal ideations. He denies side " "effects from his current medication regimen.  He requests a change in his psychotropic medications to better control his depression and anxiety at this time.     Current Psychotropic Medications:  Effexor  mg po q am  Remeron 7.5 mg po q HS     Past Psychotropic Medication Trials:  Prozac: maxed out and was not working anymore   Lithium: weight gain  Trazodone: could not wake up  Wellbutrin: no longer effective  Zoloft: no longer effective  Seroquel  Ativan      Social History:   He is a single homosexual man.  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 He continues to be dissatisfied with his current job.  He lives alone in an apartment. His grandmother is his biggest support system.      Substance Use:  Alcohol: Social drinking, drinks once a month.   Nicotine: Denies   Illicit drugs: marijuana daily for 2 years   Caffeine: 2 cups/day      Depression Screening (PHQ-9 Score):  Depression Screen (PHQ-2/PHQ-9) 7/13/2016 1/26/2017 5/6/2019   PHQ-2 Total Score - - -   PHQ-2 Total Score 2 0 2   PHQ-9 Total Score - - 7      Interpretation of PHQ-9 Total Score   Score Severity   1-4 No Depression   5-9 Mild Depression   10-14 Moderate Depression   15-19 Moderately Severe Depression   20-27 Severe Depression     Physical Examination:    10/1/19 5:06 PM         BP   136/92      Pulse   91      Resp   14      Weight    112 kg (247 lb)      Height   1.854 m (6' 1\")       Review of Symptoms:  Constitutional: denies recent chills, fevers.  Overweight.   Neuro: denies recent weakness, numbness, or headaches.   HEENT: denies recent nasal congestion or sore throat.  Cardiovascular: hx of dyslipidemia.   Respiratory: denies recent shortness of breath, dyspnea, or cough.  GI: denies recent nausea, vomiting, or diarrhea.  Recent stomach flu 2 weeks ago.  : denies recent urinary urgency frequency or urgency.  Musculoskeletal: age-appropriate gait and station, good balance. No abnormal movements noted.   " "  Skin: denies recent rash or skin lesions. Umbilicus sore currently - on abx.  Endocrine: denies recent cold and heat intolerance, denies excessive thirst.   Hematologic: denies recent abnormal bleeding and bruising easily.  Psychiatric: See HPI     Mental Status Examination:   Appearance: 29 y.o.  male, tall, earrings, dressed in casual attire, neat   Behavior: cooperative, good eye contact, no psychomotor agitation or retardation noted  Participation: active verbal participation, engaged  Speech: spontaneous, regular rate, rhythm, and volume noted.  Language appropriate.  Mood: \"anxious.\"  Affect: anxious and mood congruent  Orientation: alert and oriented to person, place, situation, and time.  Attention/concentration: Fair.  Associations/Abstract reasoning:Adequate.   Thought Process: linear, logical, and goal-directed  Thought Content: denies passive/active suicidal or homicidal ideations, intent, or plan  Perception: denies auditory or visual hallucinations, no delusions noted  Fund of knowledge and vocabulary: Adequate.  Memory: No gross evidence of memory deficits  Insight: Fair.   Judgment: Fair.     Medical Records/Labs/Diagnostic Tests:  No  records found within the last 3 years, recent relevant provider encounters reviewed, recent relevant labs in record reviewed, medications reviewed.     1/19: CBC WNL     Results for NGUYEN RUTH (MRN [id number]) as of 10/1/2019 17:07    Ref. Range 7/10/2019 17:30   TSH Latest Ref Range: 0.380 - 5.330 uIU/mL 1.980      Results for NGUYEN RUTH (MRN [id number]) as of 10/1/2019 17:07    Ref. Range 1/30/2019 17:43   Sodium Latest Ref Range: 135 - 145 mmol/L 136   Potassium Latest Ref Range: 3.6 - 5.5 mmol/L 4.1   Chloride Latest Ref Range: 96 - 112 mmol/L 106       Results for NGUYEN RUTH (MRN [id number]) as of 10/1/2019 17:07    Ref. Range 9/8/2018 08:21   Cholesterol,Tot Latest Ref Range: 100 - 199 mg/dL 137   Triglycerides " Latest Ref Range: 0 - 149 mg/dL 172 (H)   HDL Latest Ref Range: >=40 mg/dL 31 (A)   LDL Latest Ref Range: <100 mg/dL 72           Strengths/Assets:  Patient strengths identified included intelligence, resilience, evidence of good judgment, self-awareness, social support, sense of humor, social skills     Impression:  MDD, moderate, recurrent  GILBERTO     Borderline and avoidant personality disorder traits - chronic feelings of emptiness, problems with self worth and confidence, unstable relationship, fear of abandonment, history of self harm behaviors, fear of criticism and rejection.       Plan:  1. Medication options, alternatives (including no medications) and medication risks/benefits/side effects were discussed in detail.   2. Discontinue Buspar as he does not find beneficial.  3. Continue Effexor XR to 225 mg po q am for  worsening depression/anxiety.   4. Start Remeron 7.5mg po q HS for worsening depression/anxiety. Common side effects of mirtazepine were discussed and included sedation, constipation, drowsiness, increased serum cholesterol, weight gain, fatigue, insomnia, increased appetite, and xerostomia.   5. Continue weekly psychotherapy with ANNE Powers when able.   6. Vitamin D ordered per patient request.  Draw when able.  7. The patient was advised to call, message provider on [employer], or come in to the clinic if symptoms worsen or if any future questions/issues regarding their medications arise; the patient verbalized understanding and agreement.    8. The patient was educated to call 911, call the suicide hotline, or go to local ER if having thoughts of suicide or homicide; verbalized understanding.    Return to clinic in *** or sooner if symptoms worsen    The proposed treatment plan was discussed with the patient who was provided the opportunity to ask questions and make suggestions regarding alternative treatment. Patient verbalized understanding and expressed agreement with the plan.          {[facility]:[id number]}    REJI Elise.    This note was created using voice recognition software (Dragon). The accuracy of the dictation is limited by the abilities of the software. I have reviewed the note prior to signing, however some errors in grammar and context are still possible. If you have any questions related to this note please do not hesitate to contact our office.

## 2019-10-28 ENCOUNTER — APPOINTMENT (OUTPATIENT)
Dept: BEHAVIORAL HEALTH | Facility: CLINIC | Age: 29
End: 2019-10-28

## 2019-10-30 NOTE — PROGRESS NOTES
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    The patient's notes she started Remeron and is tolerating it well without side effects.  He notes that seems to be helping in the way that his \" depression and anxiety waves are not as bad or do not last as long.\"  He wishes to stay on this medication at this time.    The patient continues to express anxiety over his current life situation.  He notes \"so much changes happening in my life, I do not know how to wrap my head around it.\"  He again notes how he has been's starting a new position at work, he is now in school for business management, and he is taking care of a new cat which is causing him a lot of stress.  He notes he continues to worry about stuff he can change and has trouble controlling the worry.  He feels irritable and restless.  He denies panic attacks.  He notes he is isolating because he does not want to be around people lately.  He has racing thoughts at night.      His depression continues and he feels like he does not ever remember a time when he has not been depressed in his life.  He notes the change of seasons and it getting darker earlier outside does not help.  He feels as though he is experiencing anhedonia and just feels \"lost\" at times.  He denies suicidal ideations, passive or active at this time. He continues to attend weekly therapy sessions outside of this clinic.    The patient denies symptoms of hypomania, psychosis and homicidal ideations.  The patient continues to display criteria for borderline personality disorder including persuasive pattern of instability of interpersonal relationships, frantic efforts to avoid abandonment, alternating between extremes of idealization and evaluation, persistently " unstable self-image her sense of self, affective instability due to marked reactivity of mood, chronic feelings of emptiness, history of self-harm, and intense anger at times.      Current Psychotropic Medications:  Effexor  mg po q am  Remeron 7.5 mg po q HS    Past Psychotropic Medication Trials:  Prozac: maxed out and was not working anymore   Lithium: weight gain  Trazodone: could not wake up  Wellbutrin: no longer effective  Zoloft: no longer effective  Seroquel  Ativan      Social History:   He is a single homosexual man.  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  He lives alone in an apartment. His grandmother is his biggest support system.       Substance Use:  Alcohol: Social drinking, drinks once a month.   Nicotine: Denies   Illicit drugs: marijuana daily   Caffeine: 2 cups/day      Depression Screening (PHQ-9 Score):  Depression Screen (PHQ-2/PHQ-9) 7/13/2016 1/26/2017 5/6/2019   PHQ-2 Total Score - - -   PHQ-2 Total Score 2 0 2   PHQ-9 Total Score - - 7      Interpretation of PHQ-9 Total Score   Score Severity   1-4 No Depression   5-9 Mild Depression   10-14 Moderate Depression   15-19 Moderately Severe Depression   20-27 Severe Depression     Physical Examination:  131/85, 78, 14, 246 pounds, 6 foot 1 inch     Review of Symptoms:  Constitutional: denies recent chills, fevers.  Overweight.   Neuro: denies recent weakness, numbness, or headaches.   HEENT: denies recent nasal congestion or sore throat.  Cardiovascular: hx of dyslipidemia.   Respiratory: denies recent shortness of breath, dyspnea, or cough.  GI: denies recent nausea, vomiting, or diarrhea.  Recent stomach flu 2 weeks ago.  : denies recent urinary urgency frequency or urgency.  Musculoskeletal: age-appropriate gait and station, good balance. No abnormal movements noted.   Skin: Skin tag on anus.   Endocrine: denies recent cold and heat intolerance, denies excessive thirst.   Hematologic: denies recent  "abnormal bleeding and bruising easily.  Psychiatric: See HPI     Mental Status Examination:   Appearance: 29 y.o.  male, tall, earrings, dressed in casual attire, neat   Behavior: cooperative, good eye contact, no psychomotor agitation or retardation noted  Participation: active verbal participation, engaged  Speech: spontaneous, regular rate, rhythm, and volume noted.  Language appropriate.  Mood: \"stressed.\"  Affect: anxious and mood congruent  Orientation: alert and oriented to person, place, situation, and time.  Attention/concentration: Fair.  Associations/Abstract reasoning:Adequate.   Thought Process: linear, logical, and goal-directed  Thought Content: denies passive/active suicidal or homicidal ideations, intent, or plan  Perception: denies auditory or visual hallucinations, no delusions noted  Fund of knowledge and vocabulary: Adequate.  Memory: No gross evidence of memory deficits  Insight: Fair.   Judgment: Fair.     Medical Records/Labs/Diagnostic Tests:  No  records found within the last 3 years, recent relevant provider encounters reviewed, recent relevant labs in record reviewed, medications reviewed.     Results for NGUYEN RUTH (MRN [id number]) as of 10/1/2019 17:07    Ref. Range 7/10/2019 17:30   TSH Latest Ref Range: 0.380 - 5.330 uIU/mL 1.980      Results for NGUYEN RUTH (MRN [id number]) as of 11/5/2019 17:20   Ref. Range 1/30/2019 17:43   Sodium Latest Ref Range: 135 - 145 mmol/L 136   Potassium Latest Ref Range: 3.6 - 5.5 mmol/L 4.1   Chloride Latest Ref Range: 96 - 112 mmol/L 106     Results for NGUYEN RUTH (MRN [id number]) as of 10/1/2019 17:07    Ref. Range 1/30/2019 17:43   Sodium Latest Ref Range: 135 - 145 mmol/L 136   Potassium Latest Ref Range: 3.6 - 5.5 mmol/L 4.1   Chloride Latest Ref Range: 96 - 112 mmol/L 106       Results for NGUYEN RUTH (MRN [id number]) as of 10/1/2019 17:07    Ref. Range 9/8/2018 08:21   Cholesterol,Tot " Latest Ref Range: 100 - 199 mg/dL 137   Triglycerides Latest Ref Range: 0 - 149 mg/dL 172 (H)   HDL Latest Ref Range: >=40 mg/dL 31 (A)   LDL Latest Ref Range: <100 mg/dL 72           Strengths/Assets:  Patient strengths identified included intelligence, resilience, evidence of good judgment, self-awareness, social support, sense of humor, social skills     Impression:  Borderline Personality Disorder  Dysthymia  GILBERTO       Plan:  1. Medication options, alternatives (including no medications) and medication risks/benefits/side effects were discussed in detail.   2. Continue Effexor XR to 225 mg po q am for continued depression/anxiety.   3. Increase Remeron to 15 mg po q HS for continued depression/anxiety. Common side effects of mirtazepine were discussed and included sedation, constipation, drowsiness, increased serum cholesterol, weight gain, fatigue, insomnia, increased appetite, and xerostomia.   4. Continue weekly psychotherapy with ANNE Powers when able.   5. Encouraged lab draw as previously ordered.  6. The patient was advised to call, message provider on [employer], or come in to the clinic if symptoms worsen or if any future questions/issues regarding their medications arise; the patient verbalized understanding and agreement.    7. The patient was educated to call 911, call the suicide hotline, or go to local ER if having thoughts of suicide or homicide; verbalized understanding.    Return to clinic in 1 month or sooner if symptoms worsen    The proposed treatment plan was discussed with the patient who was provided the opportunity to ask questions and make suggestions regarding alternative treatment. Patient verbalized understanding and expressed agreement with the plan.     17 minutes were spent in psychotherapy including cognitive restructuring and behavioral changes, discussing unresolved emotional issues/helping with emotional skills, negative automatic thoughts and how to process them  better    REJI Elise.    This note was created using voice recognition software (Dragon). The accuracy of the dictation is limited by the abilities of the software. I have reviewed the note prior to signing, however some errors in grammar and context are still possible. If you have any questions related to this note please do not hesitate to contact our office.

## 2019-11-05 ENCOUNTER — OFFICE VISIT (OUTPATIENT)
Dept: BEHAVIORAL HEALTH | Facility: CLINIC | Age: 29
End: 2019-11-05
Payer: COMMERCIAL

## 2019-11-05 VITALS
HEART RATE: 78 BPM | RESPIRATION RATE: 14 BRPM | DIASTOLIC BLOOD PRESSURE: 85 MMHG | SYSTOLIC BLOOD PRESSURE: 131 MMHG | HEIGHT: 73 IN | WEIGHT: 246 LBS | BODY MASS INDEX: 32.6 KG/M2

## 2019-11-05 DIAGNOSIS — F34.1 DYSTHYMIA: ICD-10-CM

## 2019-11-05 DIAGNOSIS — F41.1 GENERALIZED ANXIETY DISORDER: ICD-10-CM

## 2019-11-05 DIAGNOSIS — F60.3 BORDERLINE PERSONALITY DISORDER (HCC): ICD-10-CM

## 2019-11-05 PROCEDURE — 90833 PSYTX W PT W E/M 30 MIN: CPT | Performed by: NURSE PRACTITIONER

## 2019-11-05 PROCEDURE — 99214 OFFICE O/P EST MOD 30 MIN: CPT | Performed by: NURSE PRACTITIONER

## 2019-11-05 RX ORDER — MIRTAZAPINE 15 MG/1
15 TABLET, FILM COATED ORAL
Qty: 30 TAB | Refills: 1 | Status: SHIPPED | OUTPATIENT
Start: 2019-11-05 | End: 2020-01-13

## 2019-12-04 RX ORDER — VENLAFAXINE HYDROCHLORIDE 225 MG/1
225 TABLET, EXTENDED RELEASE ORAL DAILY
Qty: 90 TAB | Refills: 0 | OUTPATIENT
Start: 2019-12-04 | End: 2020-03-03

## 2019-12-04 RX ORDER — VENLAFAXINE HYDROCHLORIDE 225 MG/1
TABLET, EXTENDED RELEASE ORAL
Qty: 90 TAB | Refills: 0 | Status: SHIPPED
Start: 2019-12-04 | End: 2020-01-21

## 2019-12-09 PROBLEM — F60.3 BORDERLINE PERSONALITY DISORDER (HCC): Status: ACTIVE | Noted: 2019-12-09

## 2019-12-09 PROBLEM — F60.3 BORDERLINE PERSONALITY DISORDER (HCC): Status: RESOLVED | Noted: 2019-12-09 | Resolved: 2019-12-09

## 2019-12-10 ENCOUNTER — OFFICE VISIT (OUTPATIENT)
Dept: BEHAVIORAL HEALTH | Facility: CLINIC | Age: 29
End: 2019-12-10
Payer: COMMERCIAL

## 2019-12-10 VITALS
SYSTOLIC BLOOD PRESSURE: 134 MMHG | WEIGHT: 249 LBS | BODY MASS INDEX: 33 KG/M2 | RESPIRATION RATE: 14 BRPM | HEIGHT: 73 IN | DIASTOLIC BLOOD PRESSURE: 84 MMHG | HEART RATE: 78 BPM

## 2019-12-10 DIAGNOSIS — F34.1 DYSTHYMIA: ICD-10-CM

## 2019-12-10 DIAGNOSIS — F60.3 BORDERLINE PERSONALITY DISORDER (HCC): ICD-10-CM

## 2019-12-10 DIAGNOSIS — F41.1 GENERALIZED ANXIETY DISORDER: ICD-10-CM

## 2019-12-10 PROCEDURE — 99214 OFFICE O/P EST MOD 30 MIN: CPT | Performed by: NURSE PRACTITIONER

## 2019-12-10 NOTE — PROGRESS NOTES
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    The patient spends majority of our interaction talking about his Thanksgiving experience, where he walked in on a friend's ex snorting cocaine and then later that person was charged with a DUI.  Now he is being subpoenaed to court to testify against him, which he feels uneasy about.  Dealing with this has been very stressful for him.  However, the patient notes since increasing his Remeron last appointment, his anxiety has a whole has been better.  He denies overt side effects from this medication and feels like he is tolerating it well.  He has noticed his worry, irritability, and restlessness have all decreased.  He feels he is sleeping better on this medication increase.    He notes his depression has also lessened.  He states he just woke up one day and things were better.  He notes an improved mood and anhedonia.  He has been eating and sleeping well.  He denies suicidal ideations, passive or active at this time.  The patient denies symptoms of hypomania, psychosis and homicidal ideations.      The patient continues to display criteria for borderline personality disorder including  instability of interpersonal relationships, frantic efforts to avoid abandonment, alternating between extremes of idealization, unstable self-image, affective instability, and chronic feelings of emptiness.  He continues to attend weekly therapy sessions, which he feels are helpful.     Current Psychotropic Medications:  Effexor  mg po q am  Remeron 15 mg po q HS     Past Psychotropic Medication Trials:  Prozac: maxed out and was not working anymore   Lithium: weight gain  Trazodone: could not wake up  Wellbutrin: no longer effective  Zoloft: no longer " "effective  Seroquel  Ativan      Social History:   He is a single homosexual man.  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  He lives alone in an apartment. His grandmother is his biggest support system.      No changes     Substance Use:  Alcohol: Social drinking, rare  Nicotine: Denies   Illicit drugs: marijuana daily   Caffeine: 2 cups/day      Depression Screening (PHQ-9 Score):  Depression Screen (PHQ-2/PHQ-9) 7/13/2016 1/26/2017 5/6/2019   PHQ-2 Total Score - - -   PHQ-2 Total Score 2 0 2   PHQ-9 Total Score - - 7      Interpretation of PHQ-9 Total Score   Score Severity   1-4 No Depression   5-9 Mild Depression   10-14 Moderate Depression   15-19 Moderately Severe Depression   20-27 Severe Depression     Physical Examination:  12/10/19 5:03 PM       BP  134/84     Pulse  78     Resp  14     Weight   112.9 kg (249 lb)     Height  1.854 m (6' 1\")      Musculoskeletal: age-appropriate gait and station, good balance. No abnormal movements noted.     Review of Symptoms:  Constitutional: denies recent chills, fevers.  Overweight.   Neuro: denies recent weakness, numbness, or headaches.   HEENT: denies recent nasal congestion or sore throat.  Glasses.  Cardiovascular: hx of dyslipidemia.   Respiratory: denies recent shortness of breath, dyspnea, or cough.  GI: denies recent nausea, vomiting, or diarrhea.    : denies recent urinary urgency frequency or urgency.  Skin: Skin tag on anus.   Endocrine: denies recent cold and heat intolerance, denies excessive thirst.   Hematologic: denies recent abnormal bleeding and bruising easily.  Psychiatric: See HPI     Mental Status Examination:   Appearance: 29 y.o.  male, tall, earrings, dressed in casual attire, neat, glasses, thinning hair  Behavior: cooperative, good eye contact, no psychomotor agitation or retardation noted  Participation: active verbal participation, engaged  Speech: spontaneous, regular rate, rhythm, and volume noted. " " Language appropriate.  Mood: \"better.\"  Affect: anxious and mood congruent  Orientation: alert and oriented to person, place, situation, and time.  Attention/concentration: Fair.  Associations/Abstract reasoning:Adequate.   Thought Process: linear, logical, and goal-directed  Thought Content: denies passive/active suicidal or homicidal ideations, intent, or plan  Perception: denies auditory or visual hallucinations, no delusions noted  Fund of knowledge and vocabulary: Adequate.  Memory: No gross evidence of memory deficits  Insight: Fair.   Judgment: Fair.     Medical Records/Labs/Diagnostic Tests:  No  records found within the last 3 years, recent relevant provider encounters reviewed, recent relevant labs in record reviewed, medications reviewed.     Results for NGUYEN RUTH (MRN [id number]) as of 10/1/2019 17:07    Ref. Range 7/10/2019 17:30   TSH Latest Ref Range: 0.380 - 5.330 uIU/mL 1.980      Results for NGUYEN RUTH (MRN [id number]) as of 11/5/2019 17:20    Ref. Range 1/30/2019 17:43   Sodium Latest Ref Range: 135 - 145 mmol/L 136   Potassium Latest Ref Range: 3.6 - 5.5 mmol/L 4.1   Chloride Latest Ref Range: 96 - 112 mmol/L 106      Results for NGUYEN RUTH (MRN [id number]) as of 10/1/2019 17:07    Ref. Range 1/30/2019 17:43   Sodium Latest Ref Range: 135 - 145 mmol/L 136   Potassium Latest Ref Range: 3.6 - 5.5 mmol/L 4.1   Chloride Latest Ref Range: 96 - 112 mmol/L 106       Results for NGUYEN RUTH (MRN [id number]) as of 10/1/2019 17:07    Ref. Range 9/8/2018 08:21   Cholesterol,Tot Latest Ref Range: 100 - 199 mg/dL 137   Triglycerides Latest Ref Range: 0 - 149 mg/dL 172 (H)   HDL Latest Ref Range: >=40 mg/dL 31 (A)   LDL Latest Ref Range: <100 mg/dL 72           Strengths/Assets:  Patient strengths identified included intelligence, resilience, evidence of good judgment, self-awareness, social support, sense of humor, social " skills     Impression:  Borderline Personality Disorder  Dysthymia  GILBERTO       Plan:  1. Medication options, alternatives (including no medications) and medication risks/benefits/side effects were discussed in detail.   2. Continue Effexor XR to 225 mg po q am for improved depression/anxiety.   3. Continue Remeron to 15 mg po q HS for improved depression/anxiety.   4. Continue weekly psychotherapy with ANNE Powers when able.   5. Encouraged lab draw.  6. The patient was advised to call, message provider on [employer], or come in to the clinic if symptoms worsen or if any future questions/issues regarding their medications arise; the patient verbalized understanding and agreement.    7. The patient was educated to call 911, call the suicide hotline, or go to local ER if having thoughts of suicide or homicide; verbalized understanding.    Return to clinic in 4 weeks or sooner if symptoms worsen    The proposed treatment plan was discussed with the patient who was provided the opportunity to ask questions and make suggestions regarding alternative treatment. Patient verbalized understanding and expressed agreement with the plan.     [email or web address].    This note was created using voice recognition software (Dragon). The accuracy of the dictation is limited by the abilities of the software. I have reviewed the note prior to signing, however some errors in grammar and context are still possible. If you have any questions related to this note please do not hesitate to contact our office.

## 2020-01-13 ENCOUNTER — TELEPHONE (OUTPATIENT)
Dept: BEHAVIORAL HEALTH | Facility: CLINIC | Age: 30
End: 2020-01-13

## 2020-01-13 RX ORDER — MIRTAZAPINE 15 MG/1
15 TABLET, FILM COATED ORAL
Qty: 90 TAB | Refills: 0 | Status: SHIPPED
Start: 2020-01-13 | End: 2020-03-25

## 2020-01-21 ENCOUNTER — OFFICE VISIT (OUTPATIENT)
Dept: BEHAVIORAL HEALTH | Facility: CLINIC | Age: 30
End: 2020-01-21
Payer: COMMERCIAL

## 2020-01-21 VITALS
HEIGHT: 73 IN | BODY MASS INDEX: 33 KG/M2 | HEART RATE: 80 BPM | DIASTOLIC BLOOD PRESSURE: 90 MMHG | RESPIRATION RATE: 15 BRPM | WEIGHT: 249 LBS | SYSTOLIC BLOOD PRESSURE: 139 MMHG

## 2020-01-21 DIAGNOSIS — F41.1 GENERALIZED ANXIETY DISORDER: ICD-10-CM

## 2020-01-21 DIAGNOSIS — F34.1 DYSTHYMIA: ICD-10-CM

## 2020-01-21 PROCEDURE — 99213 OFFICE O/P EST LOW 20 MIN: CPT | Performed by: NURSE PRACTITIONER

## 2020-01-21 RX ORDER — VENLAFAXINE HYDROCHLORIDE 150 MG/1
150 CAPSULE, EXTENDED RELEASE ORAL DAILY
Qty: 90 CAP | Refills: 0 | Status: SHIPPED | OUTPATIENT
Start: 2020-01-21 | End: 2020-03-13 | Stop reason: SDUPTHER

## 2020-01-21 RX ORDER — VENLAFAXINE HYDROCHLORIDE 75 MG/1
75 CAPSULE, EXTENDED RELEASE ORAL DAILY
Qty: 90 CAP | Refills: 0 | Status: SHIPPED | OUTPATIENT
Start: 2020-01-21 | End: 2020-03-13 | Stop reason: SDUPTHER

## 2020-01-21 NOTE — PROGRESS NOTES
"PSYCHIATRY FOLLOW-UP NOTE    Chief Complaint:    \"I'm pavan.\"    History Of Present Illness:  Justice Infante is a 29 y.o. male with Persistent depressive disorder and GILBERTO comes in today for follow up.    The patient continues on his medication combination of Effexor  mg p.o. daily as well as Remeron 15 mg p.o. nightly.  He is taking these medications consistently without side effects at this time.  He wishes to continue the same medication regimen without any changes at this time.    He notes he still feels mildly depressed, as he has for years.  He notes he can never remember a time where he has felt really happy.  He verbalizes mild depression as well as mild anhedonia.  He has been isolating from time to time and not wanting to go out with his friends.  However, he has been eating and sleeping well.  He denies suicidal ideations, passive or active at this time.  His anxiety continues.  Most of his anxiety stems from work or taking college classes in business.  He denies panic attacks. He notes that his current medication regimen has helped with both depression and anxiety, and these ailments are better than they have been for some time, however.  He again refuses a medication change at this time.  The patient denies symptoms of hypomania, psychosis and homicidal ideations.        Current Psychotropic Medications:  Effexor  mg po q am  Remeron 15 mg po q HS     Past Psychotropic Medication Trials:  Prozac: maxed out and was not working anymore   Lithium: weight gain  Trazodone: could not wake up  Wellbutrin: no longer effective  Zoloft: no longer effective  Seroquel  Ativan      Social History:   He is a single homosexual man.  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  He lives alone in an apartment. His grandmother is his biggest support system.  He has been trying online dating lately.     Substance Use:  Alcohol: Social drinking, rare  Nicotine: Denies   Illicit drugs: marijuana daily   Caffeine: " "2 cups/day      Depression Screening (PHQ-9 Score):  Depression Screen (PHQ-2/PHQ-9) 7/13/2016 1/26/2017 5/6/2019   PHQ-2 Total Score - - -   PHQ-2 Total Score 2 0 2   PHQ-9 Total Score - - 7      Interpretation of PHQ-9 Total Score   Score Severity   1-4 No Depression   5-9 Mild Depression   10-14 Moderate Depression   15-19 Moderately Severe Depression   20-27 Severe Depression     Physical Examination:   1/21/20 5:05 PM      BP  139/90     Pulse  80     Resp  15     Weight   112.9 kg (249 lb)     Height  1.854 m (6' 1\")       Musculoskeletal: age-appropriate gait and station, good balance. No abnormal movements noted.      Review of Symptoms:  Constitutional: denies recent chills, fevers.  Overweight.   Neuro: denies recent weakness, numbness, or headaches.   HEENT: denies recent nasal congestion or sore throat.  Glasses.  Cardiovascular: hx of dyslipidemia.   Respiratory: denies recent shortness of breath, dyspnea, or cough.  GI: denies recent nausea, vomiting, or diarrhea.    : denies recent urinary urgency frequency or urgency.  Skin: Skin tag on anus.   Endocrine: denies recent cold and heat intolerance, denies excessive thirst.   Hematologic: denies recent abnormal bleeding and bruising easily.  Psychiatric: See HPI     Mental Status Examination:   Appearance: 29 y.o.  male, tall, earrings, dressed in casual attire, neat, glasses, thinning hair  Behavior: cooperative, good eye contact, no psychomotor agitation or retardation noted  Participation: active verbal participation, engaged  Speech: spontaneous, regular rate, rhythm, and volume noted.  Language appropriate.  Mood: \"pavan.\"  Affect: anxious and mood congruent  Orientation: alert and oriented to person, place, situation, and time.  Attention/concentration: Fair.  Associations/Abstract reasoning:Adequate.   Thought Process: linear, logical, and goal-directed  Thought Content: denies passive/active suicidal or homicidal ideations, intent, or " plan  Perception: denies auditory or visual hallucinations, no delusions noted  Fund of knowledge and vocabulary: Adequate.  Memory: No gross evidence of memory deficits  Insight: Fair.   Judgment: Fair.     Medical Records/Labs/Diagnostic Tests:  No  records found within the last 3 years, recent relevant provider encounters reviewed, recent relevant labs in record reviewed, medications reviewed.      Ref. Range 7/10/2019 17:30   TSH Latest Ref Range: 0.380 - 5.330 uIU/mL 1.980        Ref. Range 1/30/2019 17:43   Sodium Latest Ref Range: 135 - 145 mmol/L 136   Potassium Latest Ref Range: 3.6 - 5.5 mmol/L 4.1   Chloride Latest Ref Range: 96 - 112 mmol/L 106        Ref. Range 1/30/2019 17:43   Sodium Latest Ref Range: 135 - 145 mmol/L 136   Potassium Latest Ref Range: 3.6 - 5.5 mmol/L 4.1   Chloride Latest Ref Range: 96 - 112 mmol/L 106       Ref. Range 9/8/2018 08:21   Cholesterol,Tot Latest Ref Range: 100 - 199 mg/dL 137   Triglycerides Latest Ref Range: 0 - 149 mg/dL 172 (H)   HDL Latest Ref Range: >=40 mg/dL 31 (A)   LDL Latest Ref Range: <100 mg/dL 72        Strengths/Assets:  Patient strengths identified included intelligence, resilience, evidence of good judgment, self-awareness, social support, sense of humor, social skills     Impression:  Dysthymia  GILBERTO       Plan:  1. Medication options, alternatives (including no medications) and medication risks/benefits/side effects were discussed in detail.   2. Continue Effexor XR to 225 mg po q am for improved depression/anxiety.   3. Continue Remeron to 15 mg po q HS for improved depression/anxiety.   4. Continue weekly psychotherapy with ANNE Powers when able.   5. Encouraged lab draw.  6. The patient was advised to call, message provider on [facility], or come in to the clinic if symptoms worsen or if any future questions/issues regarding their medications arise; the patient verbalized understanding and agreement.    7. The patient was educated to call 911,  call the suicide hotline, or go to local ER if having thoughts of suicide or homicide; verbalized understanding.    Return to clinic in 6 weeks or sooner if symptoms worsen    The proposed treatment plan was discussed with the patient who was provided the opportunity to ask questions and make suggestions regarding alternative treatment. Patient verbalized understanding and expressed agreement with the plan.     REJI Elise.    This note was created using voice recognition software (Dragon). The accuracy of the dictation is limited by the abilities of the software. I have reviewed the note prior to signing, however some errors in grammar and context are still possible. If you have any questions related to this note please do not hesitate to contact our office.

## 2020-03-13 ENCOUNTER — OFFICE VISIT (OUTPATIENT)
Dept: BEHAVIORAL HEALTH | Facility: CLINIC | Age: 30
End: 2020-03-13
Payer: COMMERCIAL

## 2020-03-13 VITALS
HEART RATE: 66 BPM | HEIGHT: 73 IN | BODY MASS INDEX: 32.87 KG/M2 | DIASTOLIC BLOOD PRESSURE: 68 MMHG | SYSTOLIC BLOOD PRESSURE: 124 MMHG | WEIGHT: 248 LBS | OXYGEN SATURATION: 97 %

## 2020-03-13 DIAGNOSIS — F41.1 GENERALIZED ANXIETY DISORDER: ICD-10-CM

## 2020-03-13 DIAGNOSIS — F33.1 MODERATE EPISODE OF RECURRENT MAJOR DEPRESSIVE DISORDER (HCC): ICD-10-CM

## 2020-03-13 DIAGNOSIS — F42.9 OBSESSIVE-COMPULSIVE DISORDER, UNSPECIFIED TYPE: ICD-10-CM

## 2020-03-13 PROCEDURE — 90833 PSYTX W PT W E/M 30 MIN: CPT | Performed by: PSYCHIATRY & NEUROLOGY

## 2020-03-13 PROCEDURE — 99999 PR NO CHARGE: CPT | Performed by: PSYCHIATRY & NEUROLOGY

## 2020-03-13 PROCEDURE — 99214 OFFICE O/P EST MOD 30 MIN: CPT | Performed by: PSYCHIATRY & NEUROLOGY

## 2020-03-13 RX ORDER — MIRTAZAPINE 30 MG/1
30 TABLET, FILM COATED ORAL
Qty: 90 TAB | Refills: 0 | Status: SHIPPED | OUTPATIENT
Start: 2020-03-13 | End: 2020-04-24 | Stop reason: SDUPTHER

## 2020-03-13 RX ORDER — VENLAFAXINE HYDROCHLORIDE 150 MG/1
150 CAPSULE, EXTENDED RELEASE ORAL DAILY
Qty: 90 CAP | Refills: 0 | Status: SHIPPED | OUTPATIENT
Start: 2020-03-13 | End: 2020-04-24 | Stop reason: SDUPTHER

## 2020-03-13 RX ORDER — VENLAFAXINE HYDROCHLORIDE 75 MG/1
75 CAPSULE, EXTENDED RELEASE ORAL DAILY
Qty: 90 CAP | Refills: 0 | Status: SHIPPED | OUTPATIENT
Start: 2020-03-13 | End: 2020-04-24 | Stop reason: SDUPTHER

## 2020-03-13 ASSESSMENT — ANXIETY QUESTIONNAIRES
1. FEELING NERVOUS, ANXIOUS, OR ON EDGE: SEVERAL DAYS
5. BEING SO RESTLESS THAT IT IS HARD TO SIT STILL: SEVERAL DAYS
6. BECOMING EASILY ANNOYED OR IRRITABLE: MORE THAN HALF THE DAYS
GAD7 TOTAL SCORE: 10
4. TROUBLE RELAXING: SEVERAL DAYS
3. WORRYING TOO MUCH ABOUT DIFFERENT THINGS: MORE THAN HALF THE DAYS
7. FEELING AFRAID AS IF SOMETHING AWFUL MIGHT HAPPEN: MORE THAN HALF THE DAYS
2. NOT BEING ABLE TO STOP OR CONTROL WORRYING: SEVERAL DAYS

## 2020-03-13 ASSESSMENT — PATIENT HEALTH QUESTIONNAIRE - PHQ9
CLINICAL INTERPRETATION OF PHQ2 SCORE: 2
5. POOR APPETITE OR OVEREATING: 2 - MORE THAN HALF THE DAYS
SUM OF ALL RESPONSES TO PHQ QUESTIONS 1-9: 12
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
## 2020-03-24 ENCOUNTER — HOSPITAL ENCOUNTER (OUTPATIENT)
Dept: LAB | Facility: MEDICAL CENTER | Age: 30
End: 2020-03-24
Attending: INTERNAL MEDICINE
Payer: COMMERCIAL

## 2020-03-24 DIAGNOSIS — F41.1 GAD (GENERALIZED ANXIETY DISORDER): ICD-10-CM

## 2020-03-24 DIAGNOSIS — Z13.6 SCREENING FOR CARDIOVASCULAR CONDITION: ICD-10-CM

## 2020-03-24 DIAGNOSIS — Z11.3 SCREEN FOR STD (SEXUALLY TRANSMITTED DISEASE): ICD-10-CM

## 2020-03-24 DIAGNOSIS — F33.1 MODERATE EPISODE OF RECURRENT MAJOR DEPRESSIVE DISORDER (HCC): ICD-10-CM

## 2020-03-24 LAB
ALBUMIN SERPL BCP-MCNC: 4.6 G/DL (ref 3.2–4.9)
ALBUMIN/GLOB SERPL: 1.7 G/DL
ALP SERPL-CCNC: 79 U/L (ref 30–99)
ALT SERPL-CCNC: 35 U/L (ref 2–50)
ANION GAP SERPL CALC-SCNC: 11 MMOL/L (ref 7–16)
AST SERPL-CCNC: 28 U/L (ref 12–45)
BILIRUB SERPL-MCNC: 0.3 MG/DL (ref 0.1–1.5)
BUN SERPL-MCNC: 12 MG/DL (ref 8–22)
CALCIUM SERPL-MCNC: 9.2 MG/DL (ref 8.5–10.5)
CHLORIDE SERPL-SCNC: 106 MMOL/L (ref 96–112)
CHOLEST SERPL-MCNC: 109 MG/DL (ref 100–199)
CO2 SERPL-SCNC: 25 MMOL/L (ref 20–33)
CREAT SERPL-MCNC: 0.74 MG/DL (ref 0.5–1.4)
FASTING STATUS PATIENT QL REPORTED: NORMAL
GLOBULIN SER CALC-MCNC: 2.7 G/DL (ref 1.9–3.5)
GLUCOSE SERPL-MCNC: 91 MG/DL (ref 65–99)
HDLC SERPL-MCNC: 28 MG/DL
HIV 1+2 AB+HIV1 P24 AG SERPL QL IA: NORMAL
LDLC SERPL CALC-MCNC: 41 MG/DL
POTASSIUM SERPL-SCNC: 4.3 MMOL/L (ref 3.6–5.5)
PROT SERPL-MCNC: 7.3 G/DL (ref 6–8.2)
SODIUM SERPL-SCNC: 142 MMOL/L (ref 135–145)
TREPONEMA PALLIDUM IGG+IGM AB [PRESENCE] IN SERUM OR PLASMA BY IMMUNOASSAY: NORMAL
TRIGL SERPL-MCNC: 199 MG/DL (ref 0–149)

## 2020-03-24 PROCEDURE — 87389 HIV-1 AG W/HIV-1&-2 AB AG IA: CPT

## 2020-03-24 PROCEDURE — 86780 TREPONEMA PALLIDUM: CPT

## 2020-03-24 PROCEDURE — 80061 LIPID PANEL: CPT

## 2020-03-24 PROCEDURE — 36415 COLL VENOUS BLD VENIPUNCTURE: CPT

## 2020-03-24 PROCEDURE — 87591 N.GONORRHOEAE DNA AMP PROB: CPT

## 2020-03-24 PROCEDURE — 80053 COMPREHEN METABOLIC PANEL: CPT

## 2020-03-24 PROCEDURE — 87491 CHLMYD TRACH DNA AMP PROBE: CPT

## 2020-03-25 ENCOUNTER — OFFICE VISIT (OUTPATIENT)
Dept: MEDICAL GROUP | Facility: MEDICAL CENTER | Age: 30
End: 2020-03-25
Payer: COMMERCIAL

## 2020-03-25 DIAGNOSIS — R10.32 LLQ PAIN: ICD-10-CM

## 2020-03-25 PROBLEM — F34.1 DYSTHYMIA: Status: RESOLVED | Noted: 2017-02-17 | Resolved: 2020-03-25

## 2020-03-25 PROCEDURE — 99214 OFFICE O/P EST MOD 30 MIN: CPT | Mod: 95,CR | Performed by: INTERNAL MEDICINE

## 2020-03-26 NOTE — PROGRESS NOTES
This encounter was conducted via Zoom meeting  Verbal consent was obtained. Patient's identity was verified.       CC: Abdominal pain                                                                                                                                     HPI:   Justice presents today with the following.    1. LLQ pain  Planing of abdominal pain starting approximately 1 month ago.  Left lower quadrant just below the umbilicus.  Reports its insidious in onset first noticing after a long drive.  Subsequently had an episode of severe pain 8 out of 10 in intensity lasting about 5 hours 3 days ago.  Pain subsided down to nothing.  It is not related to food.  Denies any blood in stool or dark tarry stools no constipation or diarrhea.  He denies any pain with urination does report occasional hesitancy.  He is not noticed any blood in his urine.  No nausea or vomiting no symptoms today.  Recent blood work showed normal chemistry and STD testing.      Patient Active Problem List    Diagnosis Date Noted   • Moderate episode of recurrent major depressive disorder (HCC) 04/10/2019   • Dyslipidemia 08/29/2018   • Obesity (BMI 30.0-34.9) 10/12/2017   • OCD (obsessive compulsive disorder) 07/13/2016   • Generalized anxiety disorder 06/05/2015       Current Outpatient Medications   Medication Sig Dispense Refill   • emtricitabine-tenofovir (TRUVADA) 200-300 MG per tablet Take 1 Tab by mouth every day. 90 Tab 3   • venlafaxine (EFFEXOR-XR) 150 MG extended-release capsule Take 1 Cap by mouth every day. 90 Cap 0   • mirtazapine (REMERON) 30 MG Tab tablet Take 1 Tab by mouth every bedtime. 90 Tab 0   • venlafaxine XR (EFFEXOR XR) 75 MG CAPSULE SR 24 HR Take 1 Cap by mouth every day. 90 Cap 0     No current facility-administered medications for this visit.          Allergies as of 03/25/2020   • (No Known Allergies)        ROS:  Denies, chest pain, Shortness of breath, Edema.       VS  Pulse described regular not fast,  Jaziel  250lbs  RR 12    Physical Exam:  Constitutional: Alert, no distress, well-groomed.  Skin: No rashes in visible areas.  Eye: Round. Conjunctiva clear, lids normal. No icterus.   ENMT: Lips pink without lesions, good dentition, moist mucous membranes. Phonation normal.  Neck: No masses, no thyromegaly. Moves freely without pain.  CV: Pulse as reported by patient  Respiratory: Unlabored respiratory effort, no cough or audible wheeze  ABD   nondistended, observe palpating his abdomen reports some mild tenderness on getting to the area of discussion the left lower quadrant.  Denied any rebound with appropriate guidance.  Psych: Alert and oriented x3, normal affect and mood.          Assessment and Plan.   29 y.o. male with the following issues.    1. LLQ pain  No specific diagnosis lends itself given the time between symptoms.  Leading suspicion is possible kidney stone versus diverticulitis.  Have written for CT of the abdomen as well as additional CBC and urinalysis.  Have given ER precautions for severe pain, fevers, hematochezia.  Will await results of testing.  - CT-ABDOMEN WITH & W/O, PELVIS WITH; Future  - CBC WITH DIFFERENTIAL; Future  - URINALYSIS,CULTURE IF INDICATED; Future

## 2020-04-10 ENCOUNTER — HOSPITAL ENCOUNTER (OUTPATIENT)
Facility: MEDICAL CENTER | Age: 30
End: 2020-04-10
Attending: INTERNAL MEDICINE
Payer: COMMERCIAL

## 2020-04-10 ENCOUNTER — HOSPITAL ENCOUNTER (OUTPATIENT)
Dept: RADIOLOGY | Facility: MEDICAL CENTER | Age: 30
End: 2020-04-10
Attending: INTERNAL MEDICINE
Payer: COMMERCIAL

## 2020-04-10 DIAGNOSIS — R10.32 LEFT LOWER QUADRANT ABDOMINAL PAIN: ICD-10-CM

## 2020-04-10 DIAGNOSIS — R10.32 LLQ PAIN: ICD-10-CM

## 2020-04-10 LAB
APPEARANCE UR: CLEAR
BASOPHILS # BLD AUTO: 0.4 % (ref 0–1.8)
BASOPHILS # BLD: 0.03 K/UL (ref 0–0.12)
BILIRUB UR QL STRIP.AUTO: NEGATIVE
COLOR UR: YELLOW
EOSINOPHIL # BLD AUTO: 0.15 K/UL (ref 0–0.51)
EOSINOPHIL NFR BLD: 1.9 % (ref 0–6.9)
ERYTHROCYTE [DISTWIDTH] IN BLOOD BY AUTOMATED COUNT: 40.3 FL (ref 35.9–50)
GLUCOSE UR STRIP.AUTO-MCNC: NEGATIVE MG/DL
HCT VFR BLD AUTO: 51.8 % (ref 42–52)
HGB BLD-MCNC: 17.6 G/DL (ref 14–18)
IMM GRANULOCYTES # BLD AUTO: 0.03 K/UL (ref 0–0.11)
IMM GRANULOCYTES NFR BLD AUTO: 0.4 % (ref 0–0.9)
KETONES UR STRIP.AUTO-MCNC: NEGATIVE MG/DL
LEUKOCYTE ESTERASE UR QL STRIP.AUTO: NEGATIVE
LYMPHOCYTES # BLD AUTO: 2.09 K/UL (ref 1–4.8)
LYMPHOCYTES NFR BLD: 25.9 % (ref 22–41)
MCH RBC QN AUTO: 31 PG (ref 27–33)
MCHC RBC AUTO-ENTMCNC: 34 G/DL (ref 33.7–35.3)
MCV RBC AUTO: 91.4 FL (ref 81.4–97.8)
MICRO URNS: NORMAL
MONOCYTES # BLD AUTO: 0.44 K/UL (ref 0–0.85)
MONOCYTES NFR BLD AUTO: 5.4 % (ref 0–13.4)
NEUTROPHILS # BLD AUTO: 5.34 K/UL (ref 1.82–7.42)
NEUTROPHILS NFR BLD: 66 % (ref 44–72)
NITRITE UR QL STRIP.AUTO: NEGATIVE
NRBC # BLD AUTO: 0 K/UL
NRBC BLD-RTO: 0 /100 WBC
PH UR STRIP.AUTO: 7.5 [PH] (ref 5–8)
PLATELET # BLD AUTO: 230 K/UL (ref 164–446)
PMV BLD AUTO: 10.8 FL (ref 9–12.9)
PROT UR QL STRIP: NEGATIVE MG/DL
RBC # BLD AUTO: 5.67 M/UL (ref 4.7–6.1)
RBC UR QL AUTO: NEGATIVE
SP GR UR STRIP.AUTO: 1.04
UROBILINOGEN UR STRIP.AUTO-MCNC: 0.2 MG/DL
WBC # BLD AUTO: 8.1 K/UL (ref 4.8–10.8)

## 2020-04-10 PROCEDURE — 85025 COMPLETE CBC W/AUTO DIFF WBC: CPT

## 2020-04-10 PROCEDURE — 36415 COLL VENOUS BLD VENIPUNCTURE: CPT

## 2020-04-10 PROCEDURE — 74177 CT ABD & PELVIS W/CONTRAST: CPT

## 2020-04-10 PROCEDURE — 700117 HCHG RX CONTRAST REV CODE 255: Performed by: INTERNAL MEDICINE

## 2020-04-10 PROCEDURE — 81003 URINALYSIS AUTO W/O SCOPE: CPT

## 2020-04-10 RX ADMIN — IOHEXOL 100 ML: 350 INJECTION, SOLUTION INTRAVENOUS at 08:19

## 2020-04-24 ENCOUNTER — TELEMEDICINE (OUTPATIENT)
Dept: BEHAVIORAL HEALTH | Facility: CLINIC | Age: 30
End: 2020-04-24
Payer: COMMERCIAL

## 2020-04-24 VITALS — BODY MASS INDEX: 32.47 KG/M2 | HEIGHT: 73 IN | WEIGHT: 245 LBS

## 2020-04-24 DIAGNOSIS — F41.1 GENERALIZED ANXIETY DISORDER: ICD-10-CM

## 2020-04-24 DIAGNOSIS — F33.1 MODERATE EPISODE OF RECURRENT MAJOR DEPRESSIVE DISORDER (HCC): ICD-10-CM

## 2020-04-24 DIAGNOSIS — F42.9 OBSESSIVE-COMPULSIVE DISORDER, UNSPECIFIED TYPE: ICD-10-CM

## 2020-04-24 PROCEDURE — 90833 PSYTX W PT W E/M 30 MIN: CPT | Mod: 95,CR | Performed by: PSYCHIATRY & NEUROLOGY

## 2020-04-24 PROCEDURE — 99214 OFFICE O/P EST MOD 30 MIN: CPT | Mod: 95,CR | Performed by: PSYCHIATRY & NEUROLOGY

## 2020-04-24 RX ORDER — VENLAFAXINE HYDROCHLORIDE 150 MG/1
150 CAPSULE, EXTENDED RELEASE ORAL DAILY
Qty: 90 CAP | Refills: 0 | Status: SHIPPED | OUTPATIENT
Start: 2020-04-24 | End: 2020-07-06 | Stop reason: SDUPTHER

## 2020-04-24 RX ORDER — VENLAFAXINE HYDROCHLORIDE 75 MG/1
75 CAPSULE, EXTENDED RELEASE ORAL DAILY
Qty: 90 CAP | Refills: 0 | Status: SHIPPED | OUTPATIENT
Start: 2020-04-24 | End: 2020-07-06 | Stop reason: SDUPTHER

## 2020-04-24 RX ORDER — MIRTAZAPINE 30 MG/1
30 TABLET, FILM COATED ORAL
Qty: 90 TAB | Refills: 0 | Status: SHIPPED | OUTPATIENT
Start: 2020-04-24 | End: 2020-07-06 | Stop reason: SDUPTHER

## 2020-04-24 ASSESSMENT — FIBROSIS 4 INDEX: FIB4 SCORE: 0.6
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
## 2020-06-01 ENCOUNTER — TELEMEDICINE (OUTPATIENT)
Dept: BEHAVIORAL HEALTH | Facility: CLINIC | Age: 30
End: 2020-06-01
Payer: COMMERCIAL

## 2020-06-01 VITALS — HEIGHT: 73 IN | WEIGHT: 247 LBS | BODY MASS INDEX: 32.74 KG/M2

## 2020-06-01 DIAGNOSIS — F33.1 MODERATE EPISODE OF RECURRENT MAJOR DEPRESSIVE DISORDER (HCC): ICD-10-CM

## 2020-06-01 DIAGNOSIS — F42.9 OBSESSIVE-COMPULSIVE DISORDER, UNSPECIFIED TYPE: ICD-10-CM

## 2020-06-01 DIAGNOSIS — F41.1 GENERALIZED ANXIETY DISORDER: ICD-10-CM

## 2020-06-01 PROCEDURE — 99214 OFFICE O/P EST MOD 30 MIN: CPT | Mod: 95,CR | Performed by: PSYCHIATRY & NEUROLOGY

## 2020-06-01 PROCEDURE — 90833 PSYTX W PT W E/M 30 MIN: CPT | Mod: 95,CR | Performed by: PSYCHIATRY & NEUROLOGY

## 2020-06-01 ASSESSMENT — FIBROSIS 4 INDEX: FIB4 SCORE: 0.62
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
## 2020-06-29 ENCOUNTER — DOCUMENTATION (OUTPATIENT)
Dept: BEHAVIORAL HEALTH | Facility: CLINIC | Age: 30
End: 2020-06-29

## 2020-07-06 ENCOUNTER — TELEMEDICINE (OUTPATIENT)
Dept: BEHAVIORAL HEALTH | Facility: CLINIC | Age: 30
End: 2020-07-06
Payer: COMMERCIAL

## 2020-07-06 VITALS — BODY MASS INDEX: 32.59 KG/M2 | HEIGHT: 73 IN

## 2020-07-06 DIAGNOSIS — F42.9 OBSESSIVE-COMPULSIVE DISORDER, UNSPECIFIED TYPE: ICD-10-CM

## 2020-07-06 DIAGNOSIS — F41.1 GENERALIZED ANXIETY DISORDER: ICD-10-CM

## 2020-07-06 DIAGNOSIS — F33.1 MODERATE EPISODE OF RECURRENT MAJOR DEPRESSIVE DISORDER (HCC): ICD-10-CM

## 2020-07-06 PROCEDURE — 90833 PSYTX W PT W E/M 30 MIN: CPT | Mod: 95,CR | Performed by: PSYCHIATRY & NEUROLOGY

## 2020-07-06 PROCEDURE — 99214 OFFICE O/P EST MOD 30 MIN: CPT | Mod: 95,CR | Performed by: PSYCHIATRY & NEUROLOGY

## 2020-07-06 RX ORDER — VENLAFAXINE HYDROCHLORIDE 150 MG/1
150 CAPSULE, EXTENDED RELEASE ORAL DAILY
Qty: 90 CAP | Refills: 0 | Status: SHIPPED | OUTPATIENT
Start: 2020-07-06 | End: 2020-09-14 | Stop reason: SDUPTHER

## 2020-07-06 RX ORDER — VENLAFAXINE HYDROCHLORIDE 75 MG/1
75 CAPSULE, EXTENDED RELEASE ORAL DAILY
Qty: 90 CAP | Refills: 0 | Status: SHIPPED | OUTPATIENT
Start: 2020-07-06 | End: 2020-09-14 | Stop reason: SDUPTHER

## 2020-07-06 RX ORDER — VENLAFAXINE HYDROCHLORIDE 37.5 MG/1
37.5 CAPSULE, EXTENDED RELEASE ORAL DAILY
Qty: 90 CAP | Refills: 0 | Status: SHIPPED | OUTPATIENT
Start: 2020-07-06 | End: 2020-09-02

## 2020-07-06 RX ORDER — MIRTAZAPINE 30 MG/1
30 TABLET, FILM COATED ORAL
Qty: 90 TAB | Refills: 0 | Status: SHIPPED | OUTPATIENT
Start: 2020-07-06 | End: 2020-09-14 | Stop reason: SDUPTHER
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
## 2020-08-07 ENCOUNTER — TELEMEDICINE (OUTPATIENT)
Dept: BEHAVIORAL HEALTH | Facility: CLINIC | Age: 30
End: 2020-08-07
Payer: COMMERCIAL

## 2020-08-07 VITALS — HEIGHT: 73 IN | BODY MASS INDEX: 32.74 KG/M2 | WEIGHT: 247 LBS

## 2020-08-07 DIAGNOSIS — F41.1 GENERALIZED ANXIETY DISORDER: ICD-10-CM

## 2020-08-07 DIAGNOSIS — F33.1 MODERATE EPISODE OF RECURRENT MAJOR DEPRESSIVE DISORDER (HCC): ICD-10-CM

## 2020-08-07 DIAGNOSIS — F42.9 OBSESSIVE-COMPULSIVE DISORDER, UNSPECIFIED TYPE: ICD-10-CM

## 2020-08-07 PROCEDURE — 99214 OFFICE O/P EST MOD 30 MIN: CPT | Mod: 95,CR | Performed by: PSYCHIATRY & NEUROLOGY

## 2020-08-07 ASSESSMENT — FIBROSIS 4 INDEX: FIB4 SCORE: 0.62
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
## 2020-09-02 RX ORDER — VENLAFAXINE HYDROCHLORIDE 37.5 MG/1
CAPSULE, EXTENDED RELEASE ORAL
Qty: 90 CAP | Refills: 0 | Status: SHIPPED | OUTPATIENT
Start: 2020-09-02 | End: 2020-09-14 | Stop reason: SDUPTHER

## 2020-09-09 DIAGNOSIS — Z11.3 SCREEN FOR STD (SEXUALLY TRANSMITTED DISEASE): ICD-10-CM

## 2020-09-09 DIAGNOSIS — K64.9 HEMORRHOIDS, UNSPECIFIED HEMORRHOID TYPE: ICD-10-CM

## 2020-09-09 RX ORDER — EMTRICITABINE AND TENOFOVIR ALAFENAMIDE 200; 25 MG/1; MG/1
1 TABLET ORAL DAILY
Qty: 90 TAB | Refills: 3 | Status: SHIPPED | OUTPATIENT
Start: 2020-09-09

## 2020-09-14 ENCOUNTER — TELEMEDICINE (OUTPATIENT)
Dept: BEHAVIORAL HEALTH | Facility: CLINIC | Age: 30
End: 2020-09-14
Payer: COMMERCIAL

## 2020-09-14 VITALS — WEIGHT: 235 LBS | HEIGHT: 73 IN | BODY MASS INDEX: 31.14 KG/M2

## 2020-09-14 DIAGNOSIS — F41.1 GENERALIZED ANXIETY DISORDER: ICD-10-CM

## 2020-09-14 DIAGNOSIS — G47.09 OTHER INSOMNIA: ICD-10-CM

## 2020-09-14 DIAGNOSIS — F42.9 OBSESSIVE-COMPULSIVE DISORDER, UNSPECIFIED TYPE: ICD-10-CM

## 2020-09-14 DIAGNOSIS — F33.1 MODERATE EPISODE OF RECURRENT MAJOR DEPRESSIVE DISORDER (HCC): ICD-10-CM

## 2020-09-14 PROCEDURE — 90833 PSYTX W PT W E/M 30 MIN: CPT | Mod: 95,CR | Performed by: PSYCHIATRY & NEUROLOGY

## 2020-09-14 PROCEDURE — 99214 OFFICE O/P EST MOD 30 MIN: CPT | Mod: 95,CR | Performed by: PSYCHIATRY & NEUROLOGY

## 2020-09-14 RX ORDER — VENLAFAXINE HYDROCHLORIDE 150 MG/1
150 CAPSULE, EXTENDED RELEASE ORAL DAILY
Qty: 90 CAP | Refills: 0 | Status: SHIPPED | OUTPATIENT
Start: 2020-09-14 | End: 2021-02-09 | Stop reason: SDUPTHER

## 2020-09-14 RX ORDER — MIRTAZAPINE 30 MG/1
30 TABLET, FILM COATED ORAL
Qty: 90 TAB | Refills: 0 | Status: SHIPPED | OUTPATIENT
Start: 2020-09-14 | End: 2021-02-09 | Stop reason: SDUPTHER

## 2020-09-14 RX ORDER — VENLAFAXINE HYDROCHLORIDE 75 MG/1
75 CAPSULE, EXTENDED RELEASE ORAL DAILY
Qty: 90 CAP | Refills: 0 | Status: SHIPPED | OUTPATIENT
Start: 2020-09-14 | End: 2021-02-09

## 2020-09-14 RX ORDER — VENLAFAXINE HYDROCHLORIDE 37.5 MG/1
CAPSULE, EXTENDED RELEASE ORAL
Qty: 90 CAP | Refills: 0 | Status: SHIPPED | OUTPATIENT
Start: 2020-09-14 | End: 2021-02-09

## 2020-09-14 ASSESSMENT — FIBROSIS 4 INDEX: FIB4 SCORE: 0.62
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
## 2020-10-29 ENCOUNTER — TELEMEDICINE (OUTPATIENT)
Dept: BEHAVIORAL HEALTH | Facility: CLINIC | Age: 30
End: 2020-10-29
Payer: COMMERCIAL

## 2020-10-29 VITALS — WEIGHT: 235 LBS | BODY MASS INDEX: 31.14 KG/M2 | HEIGHT: 73 IN

## 2020-10-29 DIAGNOSIS — G47.09 OTHER INSOMNIA: ICD-10-CM

## 2020-10-29 DIAGNOSIS — F41.1 GENERALIZED ANXIETY DISORDER: ICD-10-CM

## 2020-10-29 DIAGNOSIS — F33.1 MODERATE EPISODE OF RECURRENT MAJOR DEPRESSIVE DISORDER (HCC): ICD-10-CM

## 2020-10-29 PROCEDURE — 99214 OFFICE O/P EST MOD 30 MIN: CPT | Mod: 95,CR | Performed by: PSYCHIATRY & NEUROLOGY

## 2020-10-29 PROCEDURE — 90833 PSYTX W PT W E/M 30 MIN: CPT | Mod: 95,CR | Performed by: PSYCHIATRY & NEUROLOGY

## 2020-10-29 ASSESSMENT — FIBROSIS 4 INDEX: FIB4 SCORE: 0.62
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
## 2021-01-03 ENCOUNTER — HOSPITAL ENCOUNTER (EMERGENCY)
Facility: MEDICAL CENTER | Age: 31
End: 2021-01-03
Attending: EMERGENCY MEDICINE | Admitting: SURGERY
Payer: COMMERCIAL

## 2021-01-03 ENCOUNTER — ANESTHESIA EVENT (OUTPATIENT)
Dept: SURGERY | Facility: MEDICAL CENTER | Age: 31
End: 2021-01-03
Payer: COMMERCIAL

## 2021-01-03 ENCOUNTER — APPOINTMENT (OUTPATIENT)
Dept: RADIOLOGY | Facility: MEDICAL CENTER | Age: 31
End: 2021-01-03
Attending: EMERGENCY MEDICINE
Payer: COMMERCIAL

## 2021-01-03 ENCOUNTER — ANESTHESIA (OUTPATIENT)
Dept: SURGERY | Facility: MEDICAL CENTER | Age: 31
End: 2021-01-03
Payer: COMMERCIAL

## 2021-01-03 VITALS
RESPIRATION RATE: 18 BRPM | OXYGEN SATURATION: 94 % | DIASTOLIC BLOOD PRESSURE: 87 MMHG | TEMPERATURE: 98.3 F | BODY MASS INDEX: 31.5 KG/M2 | HEIGHT: 73 IN | WEIGHT: 237.66 LBS | SYSTOLIC BLOOD PRESSURE: 158 MMHG | HEART RATE: 88 BPM

## 2021-01-03 DIAGNOSIS — K81.9 CHOLECYSTITIS: ICD-10-CM

## 2021-01-03 LAB
ALBUMIN SERPL BCP-MCNC: 5 G/DL (ref 3.2–4.9)
ALBUMIN/GLOB SERPL: 1.5 G/DL
ALP SERPL-CCNC: 88 U/L (ref 30–99)
ALT SERPL-CCNC: 31 U/L (ref 2–50)
ANION GAP SERPL CALC-SCNC: 14 MMOL/L (ref 7–16)
AST SERPL-CCNC: 23 U/L (ref 12–45)
BASOPHILS # BLD AUTO: 0.2 % (ref 0–1.8)
BASOPHILS # BLD: 0.03 K/UL (ref 0–0.12)
BILIRUB SERPL-MCNC: 0.4 MG/DL (ref 0.1–1.5)
BUN SERPL-MCNC: 7 MG/DL (ref 8–22)
CALCIUM SERPL-MCNC: 10.2 MG/DL (ref 8.5–10.5)
CHLORIDE SERPL-SCNC: 104 MMOL/L (ref 96–112)
CO2 SERPL-SCNC: 22 MMOL/L (ref 20–33)
COVID ORDER STATUS COVID19: NORMAL
CREAT SERPL-MCNC: 0.7 MG/DL (ref 0.5–1.4)
EOSINOPHIL # BLD AUTO: 0.01 K/UL (ref 0–0.51)
EOSINOPHIL NFR BLD: 0.1 % (ref 0–6.9)
ERYTHROCYTE [DISTWIDTH] IN BLOOD BY AUTOMATED COUNT: 38.8 FL (ref 35.9–50)
GLOBULIN SER CALC-MCNC: 3.3 G/DL (ref 1.9–3.5)
GLUCOSE SERPL-MCNC: 130 MG/DL (ref 65–99)
HCT VFR BLD AUTO: 48.9 % (ref 42–52)
HGB BLD-MCNC: 17.6 G/DL (ref 14–18)
IMM GRANULOCYTES # BLD AUTO: 0.07 K/UL (ref 0–0.11)
IMM GRANULOCYTES NFR BLD AUTO: 0.5 % (ref 0–0.9)
LIPASE SERPL-CCNC: 23 U/L (ref 11–82)
LYMPHOCYTES # BLD AUTO: 0.77 K/UL (ref 1–4.8)
LYMPHOCYTES NFR BLD: 5.5 % (ref 22–41)
MCH RBC QN AUTO: 31.9 PG (ref 27–33)
MCHC RBC AUTO-ENTMCNC: 36 G/DL (ref 33.7–35.3)
MCV RBC AUTO: 88.6 FL (ref 81.4–97.8)
MONOCYTES # BLD AUTO: 0.32 K/UL (ref 0–0.85)
MONOCYTES NFR BLD AUTO: 2.3 % (ref 0–13.4)
NEUTROPHILS # BLD AUTO: 12.84 K/UL (ref 1.82–7.42)
NEUTROPHILS NFR BLD: 91.4 % (ref 44–72)
NRBC # BLD AUTO: 0 K/UL
NRBC BLD-RTO: 0 /100 WBC
PLATELET # BLD AUTO: 225 K/UL (ref 164–446)
PMV BLD AUTO: 10.3 FL (ref 9–12.9)
POTASSIUM SERPL-SCNC: 3.7 MMOL/L (ref 3.6–5.5)
PROT SERPL-MCNC: 8.3 G/DL (ref 6–8.2)
RBC # BLD AUTO: 5.52 M/UL (ref 4.7–6.1)
SARS-COV+SARS-COV-2 AG RESP QL IA.RAPID: NOTDETECTED
SODIUM SERPL-SCNC: 140 MMOL/L (ref 135–145)
SPECIMEN SOURCE: NORMAL
WBC # BLD AUTO: 14 K/UL (ref 4.8–10.8)

## 2021-01-03 PROCEDURE — 700111 HCHG RX REV CODE 636 W/ 250 OVERRIDE (IP): Performed by: SURGERY

## 2021-01-03 PROCEDURE — 85025 COMPLETE CBC W/AUTO DIFF WBC: CPT

## 2021-01-03 PROCEDURE — 160029 HCHG SURGERY MINUTES - 1ST 30 MINS LEVEL 4: Performed by: SURGERY

## 2021-01-03 PROCEDURE — 700111 HCHG RX REV CODE 636 W/ 250 OVERRIDE (IP): Performed by: EMERGENCY MEDICINE

## 2021-01-03 PROCEDURE — A9270 NON-COVERED ITEM OR SERVICE: HCPCS | Performed by: ANESTHESIOLOGY

## 2021-01-03 PROCEDURE — 700105 HCHG RX REV CODE 258: Performed by: EMERGENCY MEDICINE

## 2021-01-03 PROCEDURE — 501586 HCHG TROCAR, THRD SPIKE 5X55: Performed by: SURGERY

## 2021-01-03 PROCEDURE — 700105 HCHG RX REV CODE 258: Performed by: ANESTHESIOLOGY

## 2021-01-03 PROCEDURE — 160035 HCHG PACU - 1ST 60 MINS PHASE I: Performed by: SURGERY

## 2021-01-03 PROCEDURE — 80053 COMPREHEN METABOLIC PANEL: CPT

## 2021-01-03 PROCEDURE — 501838 HCHG SUTURE GENERAL: Performed by: SURGERY

## 2021-01-03 PROCEDURE — 700111 HCHG RX REV CODE 636 W/ 250 OVERRIDE (IP): Performed by: ANESTHESIOLOGY

## 2021-01-03 PROCEDURE — 76705 ECHO EXAM OF ABDOMEN: CPT

## 2021-01-03 PROCEDURE — 96375 TX/PRO/DX INJ NEW DRUG ADDON: CPT

## 2021-01-03 PROCEDURE — 501574 HCHG TROCAR, SMTH CAN&SEAL 5: Performed by: SURGERY

## 2021-01-03 PROCEDURE — 160041 HCHG SURGERY MINUTES - EA ADDL 1 MIN LEVEL 4: Performed by: SURGERY

## 2021-01-03 PROCEDURE — 87426 SARSCOV CORONAVIRUS AG IA: CPT

## 2021-01-03 PROCEDURE — 160002 HCHG RECOVERY MINUTES (STAT): Performed by: SURGERY

## 2021-01-03 PROCEDURE — 700101 HCHG RX REV CODE 250: Performed by: ANESTHESIOLOGY

## 2021-01-03 PROCEDURE — 160048 HCHG OR STATISTICAL LEVEL 1-5: Performed by: SURGERY

## 2021-01-03 PROCEDURE — 502571 HCHG PACK, LAP CHOLE: Performed by: SURGERY

## 2021-01-03 PROCEDURE — 500868 HCHG NEEDLE, SURGI(VARES): Performed by: SURGERY

## 2021-01-03 PROCEDURE — 88304 TISSUE EXAM BY PATHOLOGIST: CPT

## 2021-01-03 PROCEDURE — 700102 HCHG RX REV CODE 250 W/ 637 OVERRIDE(OP): Performed by: ANESTHESIOLOGY

## 2021-01-03 PROCEDURE — 96374 THER/PROPH/DIAG INJ IV PUSH: CPT

## 2021-01-03 PROCEDURE — 160036 HCHG PACU - EA ADDL 30 MINS PHASE I: Performed by: SURGERY

## 2021-01-03 PROCEDURE — 96372 THER/PROPH/DIAG INJ SC/IM: CPT

## 2021-01-03 PROCEDURE — 74176 CT ABD & PELVIS W/O CONTRAST: CPT

## 2021-01-03 PROCEDURE — 160009 HCHG ANES TIME/MIN: Performed by: SURGERY

## 2021-01-03 PROCEDURE — 99291 CRITICAL CARE FIRST HOUR: CPT

## 2021-01-03 PROCEDURE — C9803 HOPD COVID-19 SPEC COLLECT: HCPCS | Performed by: EMERGENCY MEDICINE

## 2021-01-03 PROCEDURE — 83690 ASSAY OF LIPASE: CPT

## 2021-01-03 RX ORDER — KETOROLAC TROMETHAMINE 30 MG/ML
INJECTION, SOLUTION INTRAMUSCULAR; INTRAVENOUS PRN
Status: DISCONTINUED | OUTPATIENT
Start: 2021-01-03 | End: 2021-01-03 | Stop reason: SURG

## 2021-01-03 RX ORDER — MEPERIDINE HYDROCHLORIDE 25 MG/ML
12.5 INJECTION INTRAMUSCULAR; INTRAVENOUS; SUBCUTANEOUS
Status: DISCONTINUED | OUTPATIENT
Start: 2021-01-03 | End: 2021-01-04 | Stop reason: HOSPADM

## 2021-01-03 RX ORDER — DIPHENHYDRAMINE HYDROCHLORIDE 50 MG/ML
12.5 INJECTION INTRAMUSCULAR; INTRAVENOUS
Status: DISCONTINUED | OUTPATIENT
Start: 2021-01-03 | End: 2021-01-04 | Stop reason: HOSPADM

## 2021-01-03 RX ORDER — OXYCODONE HYDROCHLORIDE 5 MG/1
5 TABLET ORAL EVERY 4 HOURS PRN
Qty: 20 TAB | Refills: 0 | Status: SHIPPED | OUTPATIENT
Start: 2021-01-03 | End: 2021-01-08

## 2021-01-03 RX ORDER — HYDROMORPHONE HYDROCHLORIDE 1 MG/ML
0.4 INJECTION, SOLUTION INTRAMUSCULAR; INTRAVENOUS; SUBCUTANEOUS
Status: DISCONTINUED | OUTPATIENT
Start: 2021-01-03 | End: 2021-01-04 | Stop reason: HOSPADM

## 2021-01-03 RX ORDER — KETOROLAC TROMETHAMINE 30 MG/ML
15 INJECTION, SOLUTION INTRAMUSCULAR; INTRAVENOUS ONCE
Status: COMPLETED | OUTPATIENT
Start: 2021-01-03 | End: 2021-01-03

## 2021-01-03 RX ORDER — OXYCODONE HCL 5 MG/5 ML
5 SOLUTION, ORAL ORAL
Status: COMPLETED | OUTPATIENT
Start: 2021-01-03 | End: 2021-01-03

## 2021-01-03 RX ORDER — OXYCODONE HCL 5 MG/5 ML
10 SOLUTION, ORAL ORAL
Status: COMPLETED | OUTPATIENT
Start: 2021-01-03 | End: 2021-01-03

## 2021-01-03 RX ORDER — EMTRICITABINE AND TENOFOVIR DISOPROXIL FUMARATE 200; 300 MG/1; MG/1
1 TABLET, FILM COATED ORAL EVERY EVENING
COMMUNITY
Start: 2020-12-03 | End: 2021-05-05

## 2021-01-03 RX ORDER — SIMETHICONE 80 MG
240 TABLET,CHEWABLE ORAL
COMMUNITY
End: 2021-07-13

## 2021-01-03 RX ORDER — HYDROMORPHONE HYDROCHLORIDE 1 MG/ML
0.1 INJECTION, SOLUTION INTRAMUSCULAR; INTRAVENOUS; SUBCUTANEOUS
Status: DISCONTINUED | OUTPATIENT
Start: 2021-01-03 | End: 2021-01-04 | Stop reason: HOSPADM

## 2021-01-03 RX ORDER — ONDANSETRON 2 MG/ML
4 INJECTION INTRAMUSCULAR; INTRAVENOUS ONCE
Status: COMPLETED | OUTPATIENT
Start: 2021-01-03 | End: 2021-01-03

## 2021-01-03 RX ORDER — MIDAZOLAM HYDROCHLORIDE 1 MG/ML
1 INJECTION INTRAMUSCULAR; INTRAVENOUS
Status: DISCONTINUED | OUTPATIENT
Start: 2021-01-03 | End: 2021-01-04 | Stop reason: HOSPADM

## 2021-01-03 RX ORDER — ROCURONIUM BROMIDE 10 MG/ML
INJECTION, SOLUTION INTRAVENOUS PRN
Status: DISCONTINUED | OUTPATIENT
Start: 2021-01-03 | End: 2021-01-03

## 2021-01-03 RX ORDER — ROCURONIUM BROMIDE 10 MG/ML
INJECTION, SOLUTION INTRAVENOUS PRN
Status: DISCONTINUED | OUTPATIENT
Start: 2021-01-03 | End: 2021-01-03 | Stop reason: SURG

## 2021-01-03 RX ORDER — ONDANSETRON 2 MG/ML
INJECTION INTRAMUSCULAR; INTRAVENOUS PRN
Status: DISCONTINUED | OUTPATIENT
Start: 2021-01-03 | End: 2021-01-03 | Stop reason: SURG

## 2021-01-03 RX ORDER — DEXAMETHASONE SODIUM PHOSPHATE 4 MG/ML
INJECTION, SOLUTION INTRA-ARTICULAR; INTRALESIONAL; INTRAMUSCULAR; INTRAVENOUS; SOFT TISSUE PRN
Status: DISCONTINUED | OUTPATIENT
Start: 2021-01-03 | End: 2021-01-03 | Stop reason: SURG

## 2021-01-03 RX ORDER — FLUMAZENIL 0.1 MG/ML
INJECTION INTRAVENOUS
Status: DISCONTINUED
Start: 2021-01-03 | End: 2021-01-04 | Stop reason: HOSPADM

## 2021-01-03 RX ORDER — LIDOCAINE HYDROCHLORIDE 20 MG/ML
INJECTION, SOLUTION EPIDURAL; INFILTRATION; INTRACAUDAL; PERINEURAL PRN
Status: DISCONTINUED | OUTPATIENT
Start: 2021-01-03 | End: 2021-01-03

## 2021-01-03 RX ORDER — SODIUM CHLORIDE, SODIUM LACTATE, POTASSIUM CHLORIDE, CALCIUM CHLORIDE 600; 310; 30; 20 MG/100ML; MG/100ML; MG/100ML; MG/100ML
INJECTION, SOLUTION INTRAVENOUS
Status: DISCONTINUED | OUTPATIENT
Start: 2021-01-03 | End: 2021-01-03 | Stop reason: SURG

## 2021-01-03 RX ORDER — LIDOCAINE HYDROCHLORIDE 20 MG/ML
INJECTION, SOLUTION EPIDURAL; INFILTRATION; INTRACAUDAL; PERINEURAL PRN
Status: DISCONTINUED | OUTPATIENT
Start: 2021-01-03 | End: 2021-01-03 | Stop reason: HOSPADM

## 2021-01-03 RX ORDER — MORPHINE SULFATE 4 MG/ML
4 INJECTION, SOLUTION INTRAMUSCULAR; INTRAVENOUS ONCE
Status: COMPLETED | OUTPATIENT
Start: 2021-01-03 | End: 2021-01-03

## 2021-01-03 RX ORDER — ONDANSETRON 2 MG/ML
4 INJECTION INTRAMUSCULAR; INTRAVENOUS
Status: DISCONTINUED | OUTPATIENT
Start: 2021-01-03 | End: 2021-01-04 | Stop reason: HOSPADM

## 2021-01-03 RX ORDER — SODIUM CHLORIDE 9 MG/ML
INJECTION, SOLUTION INTRAVENOUS CONTINUOUS
Status: CANCELLED | OUTPATIENT
Start: 2021-01-03

## 2021-01-03 RX ORDER — OXYCODONE HYDROCHLORIDE 5 MG/1
5 TABLET ORAL EVERY 4 HOURS PRN
Status: DISCONTINUED | OUTPATIENT
Start: 2021-01-03 | End: 2021-01-04 | Stop reason: HOSPADM

## 2021-01-03 RX ORDER — HYDROMORPHONE HYDROCHLORIDE 1 MG/ML
0.2 INJECTION, SOLUTION INTRAMUSCULAR; INTRAVENOUS; SUBCUTANEOUS
Status: DISCONTINUED | OUTPATIENT
Start: 2021-01-03 | End: 2021-01-04 | Stop reason: HOSPADM

## 2021-01-03 RX ORDER — SODIUM CHLORIDE, SODIUM LACTATE, POTASSIUM CHLORIDE, CALCIUM CHLORIDE 600; 310; 30; 20 MG/100ML; MG/100ML; MG/100ML; MG/100ML
INJECTION, SOLUTION INTRAVENOUS CONTINUOUS
Status: DISCONTINUED | OUTPATIENT
Start: 2021-01-03 | End: 2021-01-04 | Stop reason: HOSPADM

## 2021-01-03 RX ORDER — BUPIVACAINE HYDROCHLORIDE 2.5 MG/ML
INJECTION, SOLUTION EPIDURAL; INFILTRATION; INTRACAUDAL
Status: DISCONTINUED | OUTPATIENT
Start: 2021-01-03 | End: 2021-01-03 | Stop reason: HOSPADM

## 2021-01-03 RX ORDER — HALOPERIDOL 5 MG/ML
1 INJECTION INTRAMUSCULAR
Status: DISCONTINUED | OUTPATIENT
Start: 2021-01-03 | End: 2021-01-04 | Stop reason: HOSPADM

## 2021-01-03 RX ADMIN — FENTANYL CITRATE 100 MCG: 50 INJECTION, SOLUTION INTRAMUSCULAR; INTRAVENOUS at 20:33

## 2021-01-03 RX ADMIN — ROCURONIUM BROMIDE 50 MG: 10 INJECTION, SOLUTION INTRAVENOUS at 20:35

## 2021-01-03 RX ADMIN — PROPOFOL 180 MG: 10 INJECTION, EMULSION INTRAVENOUS at 20:35

## 2021-01-03 RX ADMIN — ONDANSETRON 4 MG: 2 INJECTION INTRAMUSCULAR; INTRAVENOUS at 21:04

## 2021-01-03 RX ADMIN — OXYCODONE HYDROCHLORIDE 10 MG: 5 SOLUTION ORAL at 21:52

## 2021-01-03 RX ADMIN — SODIUM CHLORIDE, POTASSIUM CHLORIDE, SODIUM LACTATE AND CALCIUM CHLORIDE: 600; 310; 30; 20 INJECTION, SOLUTION INTRAVENOUS at 20:29

## 2021-01-03 RX ADMIN — ONDANSETRON 4 MG: 2 INJECTION INTRAMUSCULAR; INTRAVENOUS at 16:30

## 2021-01-03 RX ADMIN — LIDOCAINE HYDROCHLORIDE 50 MG: 20 INJECTION, SOLUTION EPIDURAL; INFILTRATION; INTRACAUDAL at 20:35

## 2021-01-03 RX ADMIN — FENTANYL CITRATE 50 MCG: 50 INJECTION, SOLUTION INTRAMUSCULAR; INTRAVENOUS at 21:26

## 2021-01-03 RX ADMIN — MORPHINE SULFATE 4 MG: 4 INJECTION INTRAVENOUS at 19:45

## 2021-01-03 RX ADMIN — KETOROLAC TROMETHAMINE 15 MG: 30 INJECTION, SOLUTION INTRAMUSCULAR at 16:04

## 2021-01-03 RX ADMIN — KETOROLAC TROMETHAMINE 30 MG: 30 INJECTION, SOLUTION INTRAMUSCULAR at 21:05

## 2021-01-03 RX ADMIN — CEFTRIAXONE SODIUM 2 G: 2 INJECTION, POWDER, FOR SOLUTION INTRAMUSCULAR; INTRAVENOUS at 19:32

## 2021-01-03 RX ADMIN — DEXAMETHASONE SODIUM PHOSPHATE 8 MG: 4 INJECTION, SOLUTION INTRA-ARTICULAR; INTRALESIONAL; INTRAMUSCULAR; INTRAVENOUS; SOFT TISSUE at 20:43

## 2021-01-03 RX ADMIN — FENTANYL CITRATE 50 MCG: 50 INJECTION, SOLUTION INTRAMUSCULAR; INTRAVENOUS at 22:03

## 2021-01-03 RX ADMIN — SUGAMMADEX 200 MG: 100 INJECTION, SOLUTION INTRAVENOUS at 21:08

## 2021-01-03 ASSESSMENT — PAIN SCALES - GENERAL: PAIN_LEVEL: 3

## 2021-01-03 ASSESSMENT — FIBROSIS 4 INDEX: FIB4 SCORE: 0.62

## 2021-01-03 ASSESSMENT — PAIN DESCRIPTION - PAIN TYPE
TYPE: SURGICAL PAIN

## 2021-01-03 NOTE — ED TRIAGE NOTES
Pt ambulated to triage with   Chief Complaint   Patient presents with   • RUQ Pain     under right ribs, pt reports bout of chills at home from pain, started today     Pt in obvious discomfort.  C/o nausea.   Pt Informed regarding triage process and verbalized understanding to inform triage tech or RN for any changes in condition. Placed in lobby.

## 2021-01-03 NOTE — ED PROVIDER NOTES
ED Provider Note    CHIEF COMPLAINT  Chief Complaint   Patient presents with   • RUQ Pain     under right ribs, pt reports bout of chills at home from pain, started today       HPI  Justice Infante is a 30 y.o. male who presents with chief complaint of right upper quadrant pain.  Patient reports that pain began this morning.  He reports that the pain has been coming and going since that time.  He reports that the pain decreases without any intervention and it increases sporadically.  Patient reports that he has some associated nausea when the pain reaches its maximal intensity.  He reports he cannot get comfortable and this occurs.  He has been unable to identify any provoking or relieving factors.  He denies any associated chest pain or shortness of breath.  Patient is status post appendectomy years ago.  Patient reports it felt like gas initially and drink some apple cider vinegar but immediately vomited this up.  Patient denies any hematemesis.  Denies any melena or hematochezia.  He had a normal bowel movement since pain started.  Patient denies any associated testicular pain but does report some very mild relief whenever he urinates.  He denies any dysuria urgency frequency or hematuria.    REVIEW OF SYSTEMS  ROS    See HPI for further details. All other systems are negative.     PAST MEDICAL HISTORY   has a past medical history of Anxiety, Migraine, and OCD (obsessive compulsive disorder).    SOCIAL HISTORY  Social History     Tobacco Use   • Smoking status: Former Smoker     Packs/day: 0.25     Types: Cigarettes     Quit date: 2015     Years since quittin.7   • Smokeless tobacco: Never Used   Substance and Sexual Activity   • Alcohol use: No     Alcohol/week: 0.0 oz   • Drug use: No   • Sexual activity: Not Currently     Partners: Male     Birth control/protection: Condom       SURGICAL HISTORY   has a past surgical history that includes appendectomy ().    CURRENT MEDICATIONS  Home  Medications     Reviewed by Lorri Beck R.N. (Registered Nurse) on 01/03/21 at 1518  Med List Status: Partial   Medication Last Dose Status   emtricitabine-TAF (DESCOVY) 200-25 mg Tab tablet  Active   mirtazapine (REMERON) 30 MG Tab tablet 1/2/2021 Active   venlafaxine (EFFEXOR-XR) 150 MG extended-release capsule 1/3/2021 Active   venlafaxine XR (EFFEXOR XR) 37.5 MG CAPSULE SR 24 HR 1/3/2021 Active   venlafaxine XR (EFFEXOR XR) 75 MG CAPSULE SR 24 HR 1/3/2021 Active                ALLERGIES  No Known Allergies    PHYSICAL EXAM  Vitals:    01/03/21 1520   BP: 141/85   Pulse: 74   Resp: 16   Temp: 36.2 °C (97.1 °F)   SpO2: 96%       Physical Exam   Constitutional: He is oriented to person, place, and time. He appears well-developed and well-nourished.   Patient is moving around constantly in pain   HENT:   Head: Normocephalic and atraumatic.   Eyes: Pupils are equal, round, and reactive to light. Conjunctivae are normal.   Neck: Normal range of motion. Neck supple.   Cardiovascular: Normal rate and regular rhythm. Exam reveals no gallop and no friction rub.   No murmur heard.  Pulmonary/Chest: Effort normal and breath sounds normal. No respiratory distress. He has no wheezes.   Abdominal: Soft. Bowel sounds are normal. He exhibits no distension. There is abdominal tenderness. There is no rebound.   Mild tenderness of the right upper quadrant without rebound or guarding, bedside ultrasound is difficult secondary to overlying bowel gas at the neck of the gallbladder however the body of the gallbladder is clear of any obvious stone or shadowing   Neurological: He is alert and oriented to person, place, and time.   Skin: Skin is warm and dry.   Psychiatric: He has a normal mood and affect. His behavior is normal.         DIAGNOSTIC STUDIES / PROCEDURES    LABS  Results for orders placed or performed during the hospital encounter of 01/03/21   CBC WITH DIFFERENTIAL   Result Value Ref Range    WBC 14.0 (H) 4.8 - 10.8  K/uL    RBC 5.52 4.70 - 6.10 M/uL    Hemoglobin 17.6 14.0 - 18.0 g/dL    Hematocrit 48.9 42.0 - 52.0 %    MCV 88.6 81.4 - 97.8 fL    MCH 31.9 27.0 - 33.0 pg    MCHC 36.0 (H) 33.7 - 35.3 g/dL    RDW 38.8 35.9 - 50.0 fL    Platelet Count 225 164 - 446 K/uL    MPV 10.3 9.0 - 12.9 fL    Neutrophils-Polys 91.40 (H) 44.00 - 72.00 %    Lymphocytes 5.50 (L) 22.00 - 41.00 %    Monocytes 2.30 0.00 - 13.40 %    Eosinophils 0.10 0.00 - 6.90 %    Basophils 0.20 0.00 - 1.80 %    Immature Granulocytes 0.50 0.00 - 0.90 %    Nucleated RBC 0.00 /100 WBC    Neutrophils (Absolute) 12.84 (H) 1.82 - 7.42 K/uL    Lymphs (Absolute) 0.77 (L) 1.00 - 4.80 K/uL    Monos (Absolute) 0.32 0.00 - 0.85 K/uL    Eos (Absolute) 0.01 0.00 - 0.51 K/uL    Baso (Absolute) 0.03 0.00 - 0.12 K/uL    Immature Granulocytes (abs) 0.07 0.00 - 0.11 K/uL    NRBC (Absolute) 0.00 K/uL   CMP   Result Value Ref Range    Sodium 140 135 - 145 mmol/L    Potassium 3.7 3.6 - 5.5 mmol/L    Chloride 104 96 - 112 mmol/L    Co2 22 20 - 33 mmol/L    Anion Gap 14.0 7.0 - 16.0    Glucose 130 (H) 65 - 99 mg/dL    Bun 7 (L) 8 - 22 mg/dL    Creatinine 0.70 0.50 - 1.40 mg/dL    Calcium 10.2 8.5 - 10.5 mg/dL    AST(SGOT) 23 12 - 45 U/L    ALT(SGPT) 31 2 - 50 U/L    Alkaline Phosphatase 88 30 - 99 U/L    Total Bilirubin 0.4 0.1 - 1.5 mg/dL    Albumin 5.0 (H) 3.2 - 4.9 g/dL    Total Protein 8.3 (H) 6.0 - 8.2 g/dL    Globulin 3.3 1.9 - 3.5 g/dL    A-G Ratio 1.5 g/dL   LIPASE   Result Value Ref Range    Lipase 23 11 - 82 U/L   ESTIMATED GFR   Result Value Ref Range    GFR If African American >60 >60 mL/min/1.73 m 2    GFR If Non African American >60 >60 mL/min/1.73 m 2   COVID/SARS CoV-2 PCR    Specimen: Nasopharyngeal; Respirate   Result Value Ref Range    COVID Order Status Received    SARS-COV Antigen ZOHREH   Result Value Ref Range    SARS-CoV-2 Source Nasal Swab     SARS-COV ANTIGEN ZOHREH NotDetected Not-Detected         RADIOLOGY  US-RUQ   Final Result      1.  There is cholelithiasis.       2.  Exam is otherwise within normal limits.      CT-RENAL COLIC EVALUATION(A/P W/O)   Final Result         No evidence of  urinary tract calculus or hydronephrosis.  No evidence of peritoneal inflammation.   Mild distention of the gallbladder is noted.                 COURSE & MEDICAL DECISION MAKING  Pertinent Labs & Imaging studies reviewed. (See chart for details)    Patient here with likely kidney stone, given his age, sex and his inability to be comfortable this appears most consistent with a kidney stone and will be less likely a gallbladder issue.  Given that he does have some right upper quadrant pain a intra-abdominal pathology otherwise is possible and therefore will check basic labs to evaluate for possible transaminitis or cholestasis.  Will check CT without to evaluate for stone.  Given Toradol and Zofran in the interim.  CT fails to reveal any evidence of kidney stone.  Gallbladder is significantly distended.  Will check ultrasound.  Ultrasound reveals gallstones without any secondary findings consistent with cholecystitis.  However on reexamination patient does have a positive Avery's.  His white count is 14 and he is having ongoing pain, I do believe that developing cholecystitis is likely and therefore will discuss the case with general surgery.  Patient given ceftriaxone and Flagyl.  Morphine for pain.  I discussed the case with Dr. Tracy who will take patient to the operating room.    FINAL IMPRESSION  1.  Cholecystitis      Electronically signed by: Demar Lora M.D., 1/3/2021 3:50 PM

## 2021-01-04 ENCOUNTER — APPOINTMENT (OUTPATIENT)
Dept: RADIOLOGY | Facility: MEDICAL CENTER | Age: 31
End: 2021-01-04
Attending: EMERGENCY MEDICINE
Payer: COMMERCIAL

## 2021-01-04 ENCOUNTER — HOSPITAL ENCOUNTER (OUTPATIENT)
Facility: MEDICAL CENTER | Age: 31
End: 2021-01-07
Attending: EMERGENCY MEDICINE | Admitting: SURGERY
Payer: COMMERCIAL

## 2021-01-04 DIAGNOSIS — G89.18 POST-OPERATIVE PAIN: ICD-10-CM

## 2021-01-04 DIAGNOSIS — R55 SYNCOPE, UNSPECIFIED SYNCOPE TYPE: ICD-10-CM

## 2021-01-04 LAB
ABO + RH BLD: NORMAL
ABO GROUP BLD: NORMAL
ALBUMIN SERPL BCP-MCNC: 4 G/DL (ref 3.2–4.9)
ALBUMIN/GLOB SERPL: 1.5 G/DL
ALP SERPL-CCNC: 77 U/L (ref 30–99)
ALT SERPL-CCNC: 42 U/L (ref 2–50)
ANION GAP SERPL CALC-SCNC: 14 MMOL/L (ref 7–16)
APTT PPP: 26.5 SEC (ref 24.7–36)
AST SERPL-CCNC: 35 U/L (ref 12–45)
BASOPHILS # BLD AUTO: 0.1 % (ref 0–1.8)
BASOPHILS # BLD: 0.03 K/UL (ref 0–0.12)
BILIRUB SERPL-MCNC: 0.5 MG/DL (ref 0.1–1.5)
BLD GP AB SCN SERPL QL: NORMAL
BLOOD CULTURE HOLD CXBCH: NORMAL
BUN SERPL-MCNC: 9 MG/DL (ref 8–22)
CALCIUM SERPL-MCNC: 8.7 MG/DL (ref 8.5–10.5)
CFT BLD TEG: 5.2 MIN (ref 5–10)
CHLORIDE SERPL-SCNC: 101 MMOL/L (ref 96–112)
CLOT ANGLE BLD TEG: 68.3 DEGREES (ref 53–72)
CLOT LYSIS 30M P MA LENFR BLD TEG: 0.9 % (ref 0–8)
CO2 SERPL-SCNC: 21 MMOL/L (ref 20–33)
COVID ORDER STATUS COVID19: NORMAL
CREAT SERPL-MCNC: 0.93 MG/DL (ref 0.5–1.4)
CT.EXTRINSIC BLD ROTEM: 1.6 MIN (ref 1–3)
EKG IMPRESSION: NORMAL
EOSINOPHIL # BLD AUTO: 0.03 K/UL (ref 0–0.51)
EOSINOPHIL NFR BLD: 0.1 % (ref 0–6.9)
ERYTHROCYTE [DISTWIDTH] IN BLOOD BY AUTOMATED COUNT: 40.2 FL (ref 35.9–50)
GLOBULIN SER CALC-MCNC: 2.7 G/DL (ref 1.9–3.5)
GLUCOSE BLD-MCNC: 140 MG/DL (ref 65–99)
GLUCOSE SERPL-MCNC: 245 MG/DL (ref 65–99)
HCT VFR BLD AUTO: 35.3 % (ref 42–52)
HCT VFR BLD AUTO: 41.3 % (ref 42–52)
HGB BLD-MCNC: 10 G/DL (ref 14–18)
HGB BLD-MCNC: 10.4 G/DL (ref 14–18)
HGB BLD-MCNC: 12 G/DL (ref 14–18)
HGB BLD-MCNC: 12.4 G/DL (ref 14–18)
HGB BLD-MCNC: 14.2 G/DL (ref 14–18)
HGB RETIC QN AUTO: 37.1 PG/CELL (ref 29–35)
IMM GRANULOCYTES # BLD AUTO: 0.16 K/UL (ref 0–0.11)
IMM GRANULOCYTES NFR BLD AUTO: 0.8 % (ref 0–0.9)
IMM RETICS NFR: 12.8 % (ref 9.3–17.4)
INR PPP: 1.07 (ref 0.87–1.13)
IRON SATN MFR SERPL: 33 % (ref 15–55)
IRON SERPL-MCNC: 68 UG/DL (ref 50–180)
LYMPHOCYTES # BLD AUTO: 1.58 K/UL (ref 1–4.8)
LYMPHOCYTES NFR BLD: 7.7 % (ref 22–41)
MCF BLD TEG: 65.8 MM (ref 50–70)
MCH RBC QN AUTO: 30.9 PG (ref 27–33)
MCHC RBC AUTO-ENTMCNC: 34.4 G/DL (ref 33.7–35.3)
MCV RBC AUTO: 89.8 FL (ref 81.4–97.8)
MONOCYTES # BLD AUTO: 0.43 K/UL (ref 0–0.85)
MONOCYTES NFR BLD AUTO: 2.1 % (ref 0–13.4)
NEUTROPHILS # BLD AUTO: 18.22 K/UL (ref 1.82–7.42)
NEUTROPHILS NFR BLD: 89.2 % (ref 44–72)
NRBC # BLD AUTO: 0 K/UL
NRBC BLD-RTO: 0 /100 WBC
PA AA BLD-ACNC: 97.5 %
PA ADP BLD-ACNC: 13.3 %
PATHOLOGY CONSULT NOTE: NORMAL
PLATELET # BLD AUTO: 315 K/UL (ref 164–446)
PMV BLD AUTO: 10.7 FL (ref 9–12.9)
POTASSIUM SERPL-SCNC: 4.3 MMOL/L (ref 3.6–5.5)
PROT SERPL-MCNC: 6.7 G/DL (ref 6–8.2)
PROTHROMBIN TIME: 14.2 SEC (ref 12–14.6)
RBC # BLD AUTO: 4.6 M/UL (ref 4.7–6.1)
RETICS # AUTO: 0.07 M/UL (ref 0.04–0.06)
RETICS/RBC NFR: 2.1 % (ref 0.8–2.1)
RH BLD: NORMAL
SARS-COV-2 RDRP RESP QL NAA+PROBE: NOTDETECTED
SODIUM SERPL-SCNC: 136 MMOL/L (ref 135–145)
SPECIMEN SOURCE: NORMAL
TEG ALGORITHM TGALG: NORMAL
TIBC SERPL-MCNC: 209 UG/DL (ref 250–450)
UIBC SERPL-MCNC: 141 UG/DL (ref 110–370)
WBC # BLD AUTO: 20.5 K/UL (ref 4.8–10.8)

## 2021-01-04 PROCEDURE — 85347 COAGULATION TIME ACTIVATED: CPT

## 2021-01-04 PROCEDURE — 85576 BLOOD PLATELET AGGREGATION: CPT | Mod: 91

## 2021-01-04 PROCEDURE — 96376 TX/PRO/DX INJ SAME DRUG ADON: CPT

## 2021-01-04 PROCEDURE — G0378 HOSPITAL OBSERVATION PER HR: HCPCS

## 2021-01-04 PROCEDURE — 700105 HCHG RX REV CODE 258: Performed by: EMERGENCY MEDICINE

## 2021-01-04 PROCEDURE — 700105 HCHG RX REV CODE 258: Performed by: SURGERY

## 2021-01-04 PROCEDURE — 83550 IRON BINDING TEST: CPT

## 2021-01-04 PROCEDURE — 76705 ECHO EXAM OF ABDOMEN: CPT

## 2021-01-04 PROCEDURE — 700102 HCHG RX REV CODE 250 W/ 637 OVERRIDE(OP): Performed by: SURGERY

## 2021-01-04 PROCEDURE — 82962 GLUCOSE BLOOD TEST: CPT

## 2021-01-04 PROCEDURE — U0004 COV-19 TEST NON-CDC HGH THRU: HCPCS

## 2021-01-04 PROCEDURE — 85610 PROTHROMBIN TIME: CPT

## 2021-01-04 PROCEDURE — 80053 COMPREHEN METABOLIC PANEL: CPT

## 2021-01-04 PROCEDURE — 86850 RBC ANTIBODY SCREEN: CPT

## 2021-01-04 PROCEDURE — 99285 EMERGENCY DEPT VISIT HI MDM: CPT

## 2021-01-04 PROCEDURE — 83540 ASSAY OF IRON: CPT

## 2021-01-04 PROCEDURE — 36415 COLL VENOUS BLD VENIPUNCTURE: CPT

## 2021-01-04 PROCEDURE — 96375 TX/PRO/DX INJ NEW DRUG ADDON: CPT

## 2021-01-04 PROCEDURE — 85730 THROMBOPLASTIN TIME PARTIAL: CPT

## 2021-01-04 PROCEDURE — 700111 HCHG RX REV CODE 636 W/ 250 OVERRIDE (IP): Performed by: SURGERY

## 2021-01-04 PROCEDURE — 93005 ELECTROCARDIOGRAM TRACING: CPT | Performed by: EMERGENCY MEDICINE

## 2021-01-04 PROCEDURE — 94760 N-INVAS EAR/PLS OXIMETRY 1: CPT

## 2021-01-04 PROCEDURE — U0005 INFEC AGEN DETEC AMPLI PROBE: HCPCS

## 2021-01-04 PROCEDURE — 85046 RETICYTE/HGB CONCENTRATE: CPT

## 2021-01-04 PROCEDURE — 85018 HEMOGLOBIN: CPT

## 2021-01-04 PROCEDURE — 86900 BLOOD TYPING SEROLOGIC ABO: CPT

## 2021-01-04 PROCEDURE — A9270 NON-COVERED ITEM OR SERVICE: HCPCS | Performed by: SURGERY

## 2021-01-04 PROCEDURE — 700102 HCHG RX REV CODE 250 W/ 637 OVERRIDE(OP): Performed by: EMERGENCY MEDICINE

## 2021-01-04 PROCEDURE — 86901 BLOOD TYPING SEROLOGIC RH(D): CPT

## 2021-01-04 PROCEDURE — 700111 HCHG RX REV CODE 636 W/ 250 OVERRIDE (IP): Performed by: EMERGENCY MEDICINE

## 2021-01-04 PROCEDURE — 85384 FIBRINOGEN ACTIVITY: CPT

## 2021-01-04 PROCEDURE — 96374 THER/PROPH/DIAG INJ IV PUSH: CPT

## 2021-01-04 PROCEDURE — 85014 HEMATOCRIT: CPT

## 2021-01-04 PROCEDURE — C9803 HOPD COVID-19 SPEC COLLECT: HCPCS | Performed by: EMERGENCY MEDICINE

## 2021-01-04 PROCEDURE — 85025 COMPLETE CBC W/AUTO DIFF WBC: CPT

## 2021-01-04 PROCEDURE — A9270 NON-COVERED ITEM OR SERVICE: HCPCS | Performed by: EMERGENCY MEDICINE

## 2021-01-04 RX ORDER — OXYCODONE HYDROCHLORIDE 5 MG/1
5 TABLET ORAL
Status: DISCONTINUED | OUTPATIENT
Start: 2021-01-04 | End: 2021-01-07 | Stop reason: HOSPADM

## 2021-01-04 RX ORDER — VENLAFAXINE HYDROCHLORIDE 75 MG/1
75 CAPSULE, EXTENDED RELEASE ORAL DAILY
Status: DISCONTINUED | OUTPATIENT
Start: 2021-01-04 | End: 2021-01-04

## 2021-01-04 RX ORDER — ONDANSETRON 2 MG/ML
4 INJECTION INTRAMUSCULAR; INTRAVENOUS EVERY 6 HOURS PRN
Status: DISCONTINUED | OUTPATIENT
Start: 2021-01-04 | End: 2021-01-04

## 2021-01-04 RX ORDER — MORPHINE SULFATE 4 MG/ML
4 INJECTION, SOLUTION INTRAMUSCULAR; INTRAVENOUS
Status: DISCONTINUED | OUTPATIENT
Start: 2021-01-04 | End: 2021-01-04

## 2021-01-04 RX ORDER — OXYCODONE HYDROCHLORIDE AND ACETAMINOPHEN 5; 325 MG/1; MG/1
1 TABLET ORAL ONCE
Status: COMPLETED | OUTPATIENT
Start: 2021-01-04 | End: 2021-01-04

## 2021-01-04 RX ORDER — ENEMA 19; 7 G/133ML; G/133ML
1 ENEMA RECTAL
Status: DISCONTINUED | OUTPATIENT
Start: 2021-01-04 | End: 2021-01-07 | Stop reason: HOSPADM

## 2021-01-04 RX ORDER — AMOXICILLIN 250 MG
1 CAPSULE ORAL NIGHTLY
Status: DISCONTINUED | OUTPATIENT
Start: 2021-01-04 | End: 2021-01-07 | Stop reason: HOSPADM

## 2021-01-04 RX ORDER — BISACODYL 10 MG
10 SUPPOSITORY, RECTAL RECTAL
Status: DISCONTINUED | OUTPATIENT
Start: 2021-01-04 | End: 2021-01-07 | Stop reason: HOSPADM

## 2021-01-04 RX ORDER — SODIUM CHLORIDE, SODIUM LACTATE, POTASSIUM CHLORIDE, CALCIUM CHLORIDE 600; 310; 30; 20 MG/100ML; MG/100ML; MG/100ML; MG/100ML
1000 INJECTION, SOLUTION INTRAVENOUS ONCE
Status: COMPLETED | OUTPATIENT
Start: 2021-01-04 | End: 2021-01-04

## 2021-01-04 RX ORDER — SODIUM CHLORIDE, SODIUM LACTATE, POTASSIUM CHLORIDE, CALCIUM CHLORIDE 600; 310; 30; 20 MG/100ML; MG/100ML; MG/100ML; MG/100ML
INJECTION, SOLUTION INTRAVENOUS CONTINUOUS
Status: DISCONTINUED | OUTPATIENT
Start: 2021-01-04 | End: 2021-01-07 | Stop reason: HOSPADM

## 2021-01-04 RX ORDER — DEXTROSE, SODIUM CHLORIDE, SODIUM LACTATE, POTASSIUM CHLORIDE, AND CALCIUM CHLORIDE 5; .6; .31; .03; .02 G/100ML; G/100ML; G/100ML; G/100ML; G/100ML
INJECTION, SOLUTION INTRAVENOUS CONTINUOUS
Status: DISCONTINUED | OUTPATIENT
Start: 2021-01-04 | End: 2021-01-04

## 2021-01-04 RX ORDER — VENLAFAXINE HYDROCHLORIDE 150 MG/1
150 CAPSULE, EXTENDED RELEASE ORAL DAILY
Status: DISCONTINUED | OUTPATIENT
Start: 2021-01-04 | End: 2021-01-04

## 2021-01-04 RX ORDER — POLYETHYLENE GLYCOL 3350 17 G/17G
1 POWDER, FOR SOLUTION ORAL 2 TIMES DAILY
Status: DISCONTINUED | OUTPATIENT
Start: 2021-01-04 | End: 2021-01-07 | Stop reason: HOSPADM

## 2021-01-04 RX ORDER — FAMOTIDINE 20 MG/1
20 TABLET, FILM COATED ORAL 2 TIMES DAILY
Status: DISCONTINUED | OUTPATIENT
Start: 2021-01-04 | End: 2021-01-05

## 2021-01-04 RX ORDER — OXYCODONE HYDROCHLORIDE 10 MG/1
10 TABLET ORAL
Status: DISCONTINUED | OUTPATIENT
Start: 2021-01-04 | End: 2021-01-07 | Stop reason: HOSPADM

## 2021-01-04 RX ORDER — DOCUSATE SODIUM 100 MG/1
100 CAPSULE, LIQUID FILLED ORAL 2 TIMES DAILY
Status: DISCONTINUED | OUTPATIENT
Start: 2021-01-04 | End: 2021-01-07 | Stop reason: HOSPADM

## 2021-01-04 RX ORDER — MORPHINE SULFATE 4 MG/ML
4 INJECTION, SOLUTION INTRAMUSCULAR; INTRAVENOUS
Status: DISCONTINUED | OUTPATIENT
Start: 2021-01-04 | End: 2021-01-07 | Stop reason: HOSPADM

## 2021-01-04 RX ORDER — AMOXICILLIN 250 MG
1 CAPSULE ORAL
Status: DISCONTINUED | OUTPATIENT
Start: 2021-01-04 | End: 2021-01-07 | Stop reason: HOSPADM

## 2021-01-04 RX ORDER — MIRTAZAPINE 30 MG/1
30 TABLET, FILM COATED ORAL
Status: DISCONTINUED | OUTPATIENT
Start: 2021-01-04 | End: 2021-01-07 | Stop reason: HOSPADM

## 2021-01-04 RX ORDER — ACETAMINOPHEN 500 MG
1000 TABLET ORAL EVERY 6 HOURS
Status: DISCONTINUED | OUTPATIENT
Start: 2021-01-04 | End: 2021-01-07 | Stop reason: HOSPADM

## 2021-01-04 RX ORDER — ONDANSETRON 2 MG/ML
4 INJECTION INTRAMUSCULAR; INTRAVENOUS EVERY 4 HOURS PRN
Status: DISCONTINUED | OUTPATIENT
Start: 2021-01-04 | End: 2021-01-07 | Stop reason: HOSPADM

## 2021-01-04 RX ORDER — ONDANSETRON 2 MG/ML
4 INJECTION INTRAMUSCULAR; INTRAVENOUS ONCE
Status: COMPLETED | OUTPATIENT
Start: 2021-01-04 | End: 2021-01-04

## 2021-01-04 RX ORDER — SODIUM CHLORIDE, SODIUM LACTATE, POTASSIUM CHLORIDE, CALCIUM CHLORIDE 600; 310; 30; 20 MG/100ML; MG/100ML; MG/100ML; MG/100ML
INJECTION, SOLUTION INTRAVENOUS CONTINUOUS
Status: DISCONTINUED | OUTPATIENT
Start: 2021-01-04 | End: 2021-01-04

## 2021-01-04 RX ORDER — VENLAFAXINE HYDROCHLORIDE 37.5 MG/1
37.5 CAPSULE, EXTENDED RELEASE ORAL EVERY MORNING
Status: DISCONTINUED | OUTPATIENT
Start: 2021-01-04 | End: 2021-01-04

## 2021-01-04 RX ADMIN — MORPHINE SULFATE 4 MG: 4 INJECTION INTRAVENOUS at 13:02

## 2021-01-04 RX ADMIN — ACETAMINOPHEN 1000 MG: 500 TABLET ORAL at 13:17

## 2021-01-04 RX ADMIN — SODIUM CHLORIDE, POTASSIUM CHLORIDE, SODIUM LACTATE AND CALCIUM CHLORIDE 1000 ML: 600; 310; 30; 20 INJECTION, SOLUTION INTRAVENOUS at 02:14

## 2021-01-04 RX ADMIN — MIRTAZAPINE 30 MG: 30 TABLET, FILM COATED ORAL at 20:11

## 2021-01-04 RX ADMIN — SODIUM CHLORIDE, POTASSIUM CHLORIDE, SODIUM LACTATE AND CALCIUM CHLORIDE 1000 ML: 600; 310; 30; 20 INJECTION, SOLUTION INTRAVENOUS at 03:50

## 2021-01-04 RX ADMIN — SODIUM CHLORIDE, POTASSIUM CHLORIDE, SODIUM LACTATE AND CALCIUM CHLORIDE: 600; 310; 30; 20 INJECTION, SOLUTION INTRAVENOUS at 22:55

## 2021-01-04 RX ADMIN — DOCUSATE SODIUM 50 MG AND SENNOSIDES 8.6 MG 1 TABLET: 8.6; 5 TABLET, FILM COATED ORAL at 20:11

## 2021-01-04 RX ADMIN — FENTANYL CITRATE 50 MCG: 50 INJECTION, SOLUTION INTRAMUSCULAR; INTRAVENOUS at 10:00

## 2021-01-04 RX ADMIN — ONDANSETRON 4 MG: 2 INJECTION INTRAMUSCULAR; INTRAVENOUS at 02:13

## 2021-01-04 RX ADMIN — ONDANSETRON 4 MG: 2 INJECTION INTRAMUSCULAR; INTRAVENOUS at 13:01

## 2021-01-04 RX ADMIN — MORPHINE SULFATE 4 MG: 4 INJECTION INTRAVENOUS at 02:14

## 2021-01-04 RX ADMIN — FAMOTIDINE 20 MG: 10 INJECTION INTRAVENOUS at 17:36

## 2021-01-04 RX ADMIN — VENLAFAXINE HYDROCHLORIDE 37.5 MG: 37.5 CAPSULE, EXTENDED RELEASE ORAL at 22:53

## 2021-01-04 RX ADMIN — ACETAMINOPHEN 1000 MG: 500 TABLET ORAL at 17:36

## 2021-01-04 RX ADMIN — OXYCODONE HYDROCHLORIDE AND ACETAMINOPHEN 1 TABLET: 5; 325 TABLET ORAL at 03:50

## 2021-01-04 RX ADMIN — MORPHINE SULFATE 4 MG: 4 INJECTION INTRAVENOUS at 17:35

## 2021-01-04 RX ADMIN — ACETAMINOPHEN 1000 MG: 500 TABLET ORAL at 22:57

## 2021-01-04 RX ADMIN — SODIUM CHLORIDE, POTASSIUM CHLORIDE, SODIUM LACTATE AND CALCIUM CHLORIDE: 600; 310; 30; 20 INJECTION, SOLUTION INTRAVENOUS at 08:14

## 2021-01-04 RX ADMIN — VENLAFAXINE HYDROCHLORIDE 75 MG: 75 CAPSULE, EXTENDED RELEASE ORAL at 22:53

## 2021-01-04 RX ADMIN — VENLAFAXINE HYDROCHLORIDE 150 MG: 75 CAPSULE, EXTENDED RELEASE ORAL at 22:53

## 2021-01-04 RX ADMIN — FENTANYL CITRATE 50 MCG: 50 INJECTION, SOLUTION INTRAMUSCULAR; INTRAVENOUS at 07:02

## 2021-01-04 ASSESSMENT — PAIN DESCRIPTION - PAIN TYPE
TYPE: ACUTE PAIN

## 2021-01-04 ASSESSMENT — COGNITIVE AND FUNCTIONAL STATUS - GENERAL
SUGGESTED CMS G CODE MODIFIER DAILY ACTIVITY: CH
MOBILITY SCORE: 24
SUGGESTED CMS G CODE MODIFIER MOBILITY: CH
DAILY ACTIVITIY SCORE: 24

## 2021-01-04 ASSESSMENT — LIFESTYLE VARIABLES
TOTAL SCORE: 0
ON A TYPICAL DAY WHEN YOU DRINK ALCOHOL HOW MANY DRINKS DO YOU HAVE: 1
HAVE YOU EVER FELT YOU SHOULD CUT DOWN ON YOUR DRINKING: NO
CONSUMPTION TOTAL: NEGATIVE
HAVE PEOPLE ANNOYED YOU BY CRITICIZING YOUR DRINKING: NO
TOTAL SCORE: 0
HOW MANY TIMES IN THE PAST YEAR HAVE YOU HAD 5 OR MORE DRINKS IN A DAY: 0
ALCOHOL_USE: NO
AVERAGE NUMBER OF DAYS PER WEEK YOU HAVE A DRINK CONTAINING ALCOHOL: 1
EVER HAD A DRINK FIRST THING IN THE MORNING TO STEADY YOUR NERVES TO GET RID OF A HANGOVER: NO
EVER FELT BAD OR GUILTY ABOUT YOUR DRINKING: NO
TOTAL SCORE: 0

## 2021-01-04 ASSESSMENT — COPD QUESTIONNAIRES
DO YOU EVER COUGH UP ANY MUCUS OR PHLEGM?: NO/ONLY WITH OCCASIONAL COLDS OR INFECTIONS
DURING THE PAST 4 WEEKS HOW MUCH DID YOU FEEL SHORT OF BREATH: NONE/LITTLE OF THE TIME
HAVE YOU SMOKED AT LEAST 100 CIGARETTES IN YOUR ENTIRE LIFE: YES
COPD SCREENING SCORE: 2

## 2021-01-04 ASSESSMENT — FIBROSIS 4 INDEX: FIB4 SCORE: 0.55

## 2021-01-04 ASSESSMENT — PATIENT HEALTH QUESTIONNAIRE - PHQ9
2. FEELING DOWN, DEPRESSED, IRRITABLE, OR HOPELESS: NOT AT ALL
1. LITTLE INTEREST OR PLEASURE IN DOING THINGS: NOT AT ALL
SUM OF ALL RESPONSES TO PHQ9 QUESTIONS 1 AND 2: 0

## 2021-01-04 NOTE — ED NOTES
0700  Received report from Connie LOCKWOOD assumed care done. Call light within reach. Instructed to call staff for any assistance.  Rates pain as 3/10. Kept NPO

## 2021-01-04 NOTE — H&P
CHIEF COMPLAINT: Near syncopal event following laparoscopic cholecystectomy    HISTORY OF PRESENT ILLNESS:  The patient is a 30 year-old man who presents to the Emergency Department complaining of lightheadedness and a near syncopal event.  Describes moderate abdominal pain and an episode of diaphoresis, lightheadedness and a near syncopal event.  He denies any other symptoms.  Specifically he denies fever chills cough or pleuritic chest pain.  He returned to the emergency department for further evaluation and management.    Sonography in the emergency department demonstrated abdominal free fluid.  Routine hemogram demonstrated a two-point drop in his hemoglobin from his presurgical admission labs. He has had several episodes of postural dizziness (oddly) associated with hypertension.    PAST MEDICAL HISTORY:  has a past medical history of Anxiety, Migraine, and OCD (obsessive compulsive disorder).    PAST SURGICAL HISTORY:  has a past surgical history that includes appendectomy (2012).     ALLERGIES: No Known Allergies     CURRENT MEDICATIONS:   Home Medications     Reviewed by Anton Perez (Pharmacy Tech) on 01/04/21 at 0726  Med List Status: Complete   Medication Last Dose Status   emtricitabine-TAF (DESCOVY) 200-25 mg Tab tablet  Active   emtricitabine-tenofovir (TRUVADA) 200-300 MG per tablet  Active   mirtazapine (REMERON) 30 MG Tab tablet  Active   Multiple Vitamins-Minerals (MENS MULTIVITAMIN) Tab  Active   oxyCODONE immediate-release (ROXICODONE) 5 MG Tab  Active   simethicone (MYLICON) 80 MG Chew Tab  Active   venlafaxine (EFFEXOR-XR) 150 MG extended-release capsule  Active   venlafaxine XR (EFFEXOR XR) 37.5 MG CAPSULE SR 24 HR  Active   venlafaxine XR (EFFEXOR XR) 75 MG CAPSULE SR 24 HR  Active                FAMILY HISTORY:   Family History   Problem Relation Age of Onset   • Alcohol/Drug Mother    • Alcohol/Drug Father        SOCIAL HISTORY:   Social History     Tobacco Use   • Smoking  status: Former Smoker     Packs/day: 0.25     Types: Cigarettes     Quit date: 2015     Years since quittin.7   • Smokeless tobacco: Never Used   Substance and Sexual Activity   • Alcohol use: No     Alcohol/week: 0.0 oz   • Drug use: No   • Sexual activity: Not Currently     Partners: Male     Birth control/protection: Condom       REVIEW OF SYSTEMS: Comprehensive review of systems is negative with the exception of the aforementioned HPI, PMH, and PSH bullets in accordance with CMS guidelines.     PHYSICAL EXAMINATION:   CONSTITUTIONAL:     Vital Signs: /80   Pulse (!) 107   Temp 36.4 °C (97.5 °F) (Temporal)   Resp (!) 24   Wt 105 kg (231 lb 7.7 oz)   SpO2 99%    General Appearance: appears stated age, is in no apparent distress.  HEAD AND NECK: The pupils are equal, round, and reactive to light bilaterally.. The sclera are non icteric. Nares and oropharynx are clear.   NECK: Supple. No adenopathy.  RESPIRATORY:   Inspection: Unlabored respirations, no intercostal retractions, paradoxical motion, or accessory muscle use.   Palpation:  The chest is nontender.    Auscultation: clear to auscultation.  CARDIOVASCULAR:   Inspection: The skin is warm, dry and well purfused.  Auscultation: Sinus tachycardia.   Peripheral Pulses: Normal.   ABDOMEN:   Inspection: Abdominal inspection reveals mild abdominal distension.   Palpation: Palpation is remarkable for mild tenderness in the right subcostal and port site regions.   EXTREMITIES: Examination of the upper and lower extremities demonstrates no cyanosis edema or clubbing.  NEUROLOGIC: Alert & oriented x 3, Normal motor function, Normal sensory function, No focal deficits noted.  PSYCHIATRIC:   does not appear depressed or anxious, oriented to time, place, person, short and long term memory appears intact, judgement and insight appear intact.    LABORATORY VALUES:   Recent Labs     21  1620 21  0210 21  0645 21  0807   WBC 14.0*  20.5*  --   --    RBC 5.52 4.60*  --   --    HEMOGLOBIN 17.6 14.2 12.4* 12.0*   HEMATOCRIT 48.9 41.3* 35.3*  --    MCV 88.6 89.8  --   --    MCH 31.9 30.9  --   --    MCHC 36.0* 34.4  --   --    RDW 38.8 40.2  --   --    PLATELETCT 225 315  --   --    MPV 10.3 10.7  --   --      Recent Labs     01/03/21  1620 01/04/21  0210   SODIUM 140 136   POTASSIUM 3.7 4.3   CHLORIDE 104 101   CO2 22 21   GLUCOSE 130* 245*   BUN 7* 9   CREATININE 0.70 0.93   CALCIUM 10.2 8.7     Recent Labs     01/03/21  1620 01/04/21  0210   ASTSGOT 23 35   ALTSGPT 31 42   TBILIRUBIN 0.4 0.5   ALKPHOSPHAT 88 77   GLOBULIN 3.3 2.7            IMAGING:   US-ABDOMEN F.A.S.T. LTD (FOR ED USE ONLY)   Final Result      Free fluid seen within the upper abdomen and complex fluid seen within the pelvis. These findings are worrisome for intraperitoneal hemorrhage.      This was discussed with MAC ENCARNACION at 6:55 AM on 1/4/2021.                 IMPRESSION AND PLAN:    Dizziness and modest drop in hemoglobin following laparoscopic cholecystectomy. Concern for post operative hemorrhage. Will admit and follow hemogram and symptoms. Low threshold for repeat operative intervention.     ____________________________________     Geraldo Yepez M.D.    DD: 1/4/2021  7:18 AM

## 2021-01-04 NOTE — PROGRESS NOTES
Trauma Surgery SARS-CoV-2 Contact Precautions De-escalation Note    Admission SARS-CoV-2 PCR testing negative. LOW RISK patient. Repeat SARS-CoV-2 testing not indicated.   Isolation precautions de-escalated.     ____________________________________     Geraldo Yepez M.D.    DD: 1/4/2021  9:28 AM

## 2021-01-04 NOTE — ANESTHESIA PROCEDURE NOTES
Airway    Date/Time: 1/3/2021 8:36 PM  Performed by: Chuy Gonzalez M.D.  Authorized by: Chuy Gonzalez M.D.     Location:  OR  Urgency:  Elective  Difficult Airway: No    Indications for Airway Management:  Anesthesia      Spontaneous Ventilation: absent    Sedation Level:  Deep  Preoxygenated: Yes    Patient Position:  Sniffing  Final Airway Type:  Endotracheal airway  Final Endotracheal Airway:  ETT  Cuffed: Yes    Technique Used for Successful ETT Placement:  Direct laryngoscopy    Insertion Site:  Oral  Blade Type:  Sujey  Laryngoscope Blade/Videolaryngoscope Blade Size:  3  ETT Size (mm):  8.0  Measured from:  Lips  Placement Verified by: auscultation and capnometry    Cormack-Lehane Classification:  Grade IIa - partial view of glottis  Number of Attempts at Approach:  1  Number of Other Approaches Attempted:  0

## 2021-01-04 NOTE — ED NOTES
Pt unable to urinate, reports that he hasn't voided since his surgery last night.    Straight cath inserted >1000ml clear yellow urine drained.  Pt reports feeling much better

## 2021-01-04 NOTE — DISCHARGE INSTRUCTIONS
ACTIVITY: Rest and take it easy for the first 24 hours.  A responsible adult is recommended to remain with you during that time.  It is normal to feel sleepy.  We encourage you to not do anything that requires balance, judgment or coordination.    MILD FLU-LIKE SYMPTOMS ARE NORMAL. YOU MAY EXPERIENCE GENERALIZED MUSCLE ACHES, THROAT IRRITATION, HEADACHE AND/OR SOME NAUSEA.    FOR 24 HOURS DO NOT:  Drive, operate machinery or run household appliances.  Drink beer or alcoholic beverages.   Make important decisions or sign legal documents.    SPECIAL INSTRUCTIONS: keep incisions clean and dry.      DIET: To avoid nausea, slowly advance diet as tolerated, avoiding spicy or greasy foods for the first day.  Add more substantial food to your diet according to your physician's instructions.  Babies can be fed formula or breast milk as soon as they are hungry.  INCREASE FLUIDS AND FIBER TO AVOID CONSTIPATION.    SURGICAL DRESSING/BATHING: may shower tomorrow, let the water run off of your abdomen, do not rub or peel off glue under the 2x2 and tegaderm.      FOLLOW-UP APPOINTMENT:  A follow-up appointment should be arranged with your doctor in one week call to schedule.    You should CALL YOUR PHYSICIAN if you develop:  Fever greater than 101 degrees F.  Pain not relieved by medication, or persistent nausea or vomiting.  Excessive bleeding (blood soaking through dressing) or unexpected drainage from the wound.  Extreme redness or swelling around the incision site, drainage of pus or foul smelling drainage.  Inability to urinate or empty your bladder within 8 hours.  Problems with breathing or chest pain.    You should call 911 if you develop problems with breathing or chest pain.  If you are unable to contact your doctor or surgical center, you should go to the nearest emergency room or urgent care center.  Physician's telephone #: [phone] Dr. Tracy    If any questions arise, call your doctor.  If your doctor is not  available, please feel free to call the Surgical Center at [phone]. The Contact Center is open Monday through Friday 7AM to 5PM and may speak to a nurse at [phone], or toll free at [phone].     A registered nurse may call you a few days after your surgery to see how you are doing after your procedure.    MEDICATIONS: Resume taking daily medication.  Take prescribed pain medication with food.  If no medication is prescribed, you may take non-aspirin pain medication if needed.  PAIN MEDICATION CAN BE VERY CONSTIPATING.  Take a stool softener or laxative such as senokot, pericolace, or milk of magnesia if needed.      If your physician has prescribed pain medication that includes Acetaminophen (Tylenol), do not take additional Acetaminophen (Tylenol) while taking the prescribed medication.    Depression / Suicide Risk    As you are discharged from this [facility] facility, it is important to learn how to keep safe from harming yourself.    Recognize the warning signs:  · Abrupt changes in personality, positive or negative- including increase in energy   · Giving away possessions  · Change in eating patterns- significant weight changes-  positive or negative  · Change in sleeping patterns- unable to sleep or sleeping all the time   · Unwillingness or inability to communicate  · Depression  · Unusual sadness, discouragement and loneliness  · Talk of wanting to die  · Neglect of personal appearance   · Rebelliousness- reckless behavior  · Withdrawal from people/activities they love  · Confusion- inability to concentrate     If you or a loved one observes any of these behaviors or has concerns about self-harm, here's what you can do:  · Talk about it- your feelings and reasons for harming yourself  · Remove any means that you might use to hurt yourself (examples: pills, rope, extension cords, firearm)  · Get professional help from the community (Mental Health, Substance Abuse, psychological  counseling)  · Do not be alone:Call your Safe Contact- someone whom you trust who will be there for you.  · Call your local CRISIS HOTLINE 683-5188 or 835-043-4099  · Call your local Children's Mobile Crisis Response Team [place] (688) 871-4843 or www.[employer]  · Call the toll free National Suicide Prevention Hotlines   · National Suicide Prevention Lifeline 341-193-XGDE (8697)  · National Hope Line Network 800-SUICIDE (205-6672)

## 2021-01-04 NOTE — ED NOTES
0730  Pt offered scheduled pain medication and other scheduled medication but denies need. Will re-assess pt again for need of pain medication.

## 2021-01-04 NOTE — ED NOTES
Pt attempted to stand but became tachycardic in the 150's upon sitting up in bed with increasing pain. Dr. Laguna notified.

## 2021-01-04 NOTE — ANESTHESIA POSTPROCEDURE EVALUATION
Patient: Justice Infante    Procedure Summary     Date: 01/03/21 Room / Location: [place] [id number] / SURGERY [facility]    Anesthesia Start: 2029 Anesthesia Stop: 2121    Procedure: CHOLECYSTECTOMY, LAPAROSCOPIC (Abdomen) Diagnosis: (Acute cholecystitis with cholelithiasis)    Surgeons: Re Tracy M.D. Responsible Provider: Chuy Gonzalez M.D.    Anesthesia Type: general ASA Status: 2          Final Anesthesia Type: general  Last vitals  BP   Blood Pressure: 158/87, NIBP: 147/79    Temp   36.8 °C (98.3 °F)    Pulse   Pulse: (!) 125   Resp   14    SpO2   93 %      Anesthesia Post Evaluation    Patient location during evaluation: PACU  Patient participation: complete - patient participated  Level of consciousness: awake and alert  Pain score: 3    Airway patency: patent  Anesthetic complications: no  Cardiovascular status: hemodynamically stable  Respiratory status: acceptable  Hydration status: euvolemic    PONV: none           Nurse Pain Score: 1 (NPRS)

## 2021-01-04 NOTE — ANESTHESIA TIME REPORT
Anesthesia Start and Stop Event Times     Date Time Event    1/3/2021 2021 Ready for Procedure     2029 Anesthesia Start     2121 Anesthesia Stop        Responsible Staff  01/03/21    Name Role Begin End    Chuy Gonzalez M.D. Anesth 2029 2121        Preop Diagnosis (Free Text):  Pre-op Diagnosis     Acute cholecystitis with cholelithiasis        Preop Diagnosis (Codes):    Post op Diagnosis  Cholelithiasis      Premium Reason  E. Weekend    Comments:

## 2021-01-04 NOTE — ED PROVIDER NOTES
ED Provider Note        Primary care provider: Demar Dobson M.D.    I verified that the patient was wearing a mask and I was wearing appropriate PPE every time I entered the room. The patient's mask was on the patient at all times during my encounter except for a brief view of the oropharynx.      CHIEF COMPLAINT  Chief Complaint   Patient presents with   • GLF     x 2, cholecystectomy today, d/c from hospital at  on 1/3/21. Denies head/neck pain, no visible head trauma. tachycadia 140s       HPI  Justice Infante is a 30 y.o. male who presents to the Emergency Department with chief complaint of syncope.  Patient returned home was just discharged after cholecystectomy.  Upon getting home he had some moderate pain in his abdomen upon ambulating he got profoundly diaphoretic and passed out.  He did hit his head.  Questionable loss of consciousness.  At this time he reports no altered mental status no headache no neck pain no numbness tingling weakness in extremities.  His pain in his abdomen is moderate and states that he feels as though the pain medicine he was given prior to discharge is wearing off.  No fevers no chills no cough congestion chest pain shortness of breath no other acute symptoms or concerns.    REVIEW OF SYSTEMS  10 systems reviewed and otherwise negative, pertinent positives and negatives listed in the history of present illness.    PAST MEDICAL HISTORY   has a past medical history of Anxiety, Migraine, and OCD (obsessive compulsive disorder).    SURGICAL HISTORY   has a past surgical history that includes appendectomy ().    SOCIAL HISTORY  Social History     Tobacco Use   • Smoking status: Former Smoker     Packs/day: 0.25     Types: Cigarettes     Quit date: 2015     Years since quittin.7   • Smokeless tobacco: Never Used   Substance Use Topics   • Alcohol use: No     Alcohol/week: 0.0 oz   • Drug use: No      Social History     Substance and Sexual Activity   Drug  Use No       FAMILY HISTORY  Non-Contributory    CURRENT MEDICATIONS  Home Medications     Reviewed by Connie Gasca R.N. (Registered Nurse) on 01/04/21 at 0203  Med List Status: Not Addressed   Medication Last Dose Status   emtricitabine-TAF (DESCOVY) 200-25 mg Tab tablet  Active   emtricitabine-tenofovir (TRUVADA) 200-300 MG per tablet  Active   mirtazapine (REMERON) 30 MG Tab tablet  Active   Multiple Vitamins-Minerals (MENS MULTIVITAMIN) Tab  Active   oxyCODONE immediate-release (ROXICODONE) 5 MG Tab  Active   simethicone (MYLICON) 80 MG Chew Tab  Active   venlafaxine (EFFEXOR-XR) 150 MG extended-release capsule  Active   venlafaxine XR (EFFEXOR XR) 37.5 MG CAPSULE SR 24 HR  Active   venlafaxine XR (EFFEXOR XR) 75 MG CAPSULE SR 24 HR  Active                ALLERGIES  No Known Allergies    PHYSICAL EXAM  VITAL SIGNS: /97   Pulse (!) 149   Temp 36.4 °C (97.5 °F) (Temporal)   Resp (!) 22   Wt 105 kg (231 lb 7.7 oz)   SpO2 88%   BMI 30.54 kg/m²   Pulse ox interpretation: I interpret this pulse ox as normal.  Constitutional: Alert and oriented x 3, minimal distress  HEENT: Atraumatic normocephalic, pupils are equal round reactive to light extraocular movements are intact. The nares is clear, external ears are normal, mouth shows moist mucous membranes  Neck: Supple, no JVD no tracheal deviation  Cardiovascular: Regular rate and rhythm no murmur rub or gallop 2+ pulses peripherally x4  Thorax & Lungs: No respiratory distress, no wheezes rales or rhonchi, No chest tenderness.   GI: Patient has diffuse tenderness does have localization to the right upper quadrant.  There is no rebound no guarding active bowel sounds  Skin: Warm dry no acute rash or lesion  Musculoskeletal: Moving all extremities with full range and 5 of 5 strength, no acute  deformity  Neurologic: Cranial nerves III through XII are grossly intact, no sensory deficit, no cerebellar dysfunction   Psychiatric: Appropriate affect for situation  at this time      DIAGNOSTIC STUDIES / PROCEDURES  LABS      Results for orders placed or performed during the hospital encounter of 01/03/21   CBC WITH DIFFERENTIAL   Result Value Ref Range    WBC 14.0 (H) 4.8 - 10.8 K/uL    RBC 5.52 4.70 - 6.10 M/uL    Hemoglobin 17.6 14.0 - 18.0 g/dL    Hematocrit 48.9 42.0 - 52.0 %    MCV 88.6 81.4 - 97.8 fL    MCH 31.9 27.0 - 33.0 pg    MCHC 36.0 (H) 33.7 - 35.3 g/dL    RDW 38.8 35.9 - 50.0 fL    Platelet Count 225 164 - 446 K/uL    MPV 10.3 9.0 - 12.9 fL    Neutrophils-Polys 91.40 (H) 44.00 - 72.00 %    Lymphocytes 5.50 (L) 22.00 - 41.00 %    Monocytes 2.30 0.00 - 13.40 %    Eosinophils 0.10 0.00 - 6.90 %    Basophils 0.20 0.00 - 1.80 %    Immature Granulocytes 0.50 0.00 - 0.90 %    Nucleated RBC 0.00 /100 WBC    Neutrophils (Absolute) 12.84 (H) 1.82 - 7.42 K/uL    Lymphs (Absolute) 0.77 (L) 1.00 - 4.80 K/uL    Monos (Absolute) 0.32 0.00 - 0.85 K/uL    Eos (Absolute) 0.01 0.00 - 0.51 K/uL    Baso (Absolute) 0.03 0.00 - 0.12 K/uL    Immature Granulocytes (abs) 0.07 0.00 - 0.11 K/uL    NRBC (Absolute) 0.00 K/uL   CMP   Result Value Ref Range    Sodium 140 135 - 145 mmol/L    Potassium 3.7 3.6 - 5.5 mmol/L    Chloride 104 96 - 112 mmol/L    Co2 22 20 - 33 mmol/L    Anion Gap 14.0 7.0 - 16.0    Glucose 130 (H) 65 - 99 mg/dL    Bun 7 (L) 8 - 22 mg/dL    Creatinine 0.70 0.50 - 1.40 mg/dL    Calcium 10.2 8.5 - 10.5 mg/dL    AST(SGOT) 23 12 - 45 U/L    ALT(SGPT) 31 2 - 50 U/L    Alkaline Phosphatase 88 30 - 99 U/L    Total Bilirubin 0.4 0.1 - 1.5 mg/dL    Albumin 5.0 (H) 3.2 - 4.9 g/dL    Total Protein 8.3 (H) 6.0 - 8.2 g/dL    Globulin 3.3 1.9 - 3.5 g/dL    A-G Ratio 1.5 g/dL   LIPASE   Result Value Ref Range    Lipase 23 11 - 82 U/L   ESTIMATED GFR   Result Value Ref Range    GFR If African American >60 >60 mL/min/1.73 m 2    GFR If Non African American >60 >60 mL/min/1.73 m 2   COVID/SARS CoV-2 PCR    Specimen: Nasopharyngeal; Respirate   Result Value Ref Range    COVID Order  Status Received    SARS-COV Antigen ZOHREH   Result Value Ref Range    SARS-CoV-2 Source Nasal Swab     SARS-COV ANTIGEN ZOHREH NotDetected Not-Detected       All labs reviewed by me.      RADIOLOGY  No orders to display     The radiologist's interpretation of all radiological studies have been reviewed by me.    COURSE & MEDICAL DECISION MAKING  Pertinent Labs & Imaging studies reviewed. (See chart for details)    2:01 AM - Patient seen and examined at bedside.     Coagulation studies were ordered in light of possible need for surgical intervention    Patient noted to have slightly elevated blood pressure likely circumstantial secondary to presenting complaint. Referred to primary care physician for further evaluation.    Patient was given IV fluids based on tachycardia and syncope, oral hydration was not attempted due to insufficiency for hydration, after fluids had improvement of vital signs    Medical Decision Making: Pleasant 30-year-old male who is now approximately 12 hours postop for cholecystectomy laparoscopic.  Patient had syncopal event prior to arrival here he was profoundly tachycardic upon arrival.  However he is also hypertensive.  With pain control his vital signs rapidly improved.  He is observed for several hours his hemoglobin was 14.2 it was 17.6 at arrival yesterday however patient had been given IV fluids and had surgical procedure felt as though with his hypertension and reduction of tachycardia with pain management that is tachycardia was much more likely from the pain rather than from acute blood loss.  Discussed with Dr. Tracy and we deemed the patient likely appropriate for discharge if he maintains stability.  He was observed for several hours was feeling much better.  We attempted to ambulate the patient in preparation for discharge and he once again became diaphoretic felt weak and his heart rate jumped into the 150s again.  At which point I had a second discussion with his  surgeon with and  we obtained abdominal ultrasound which demonstrated a large amount of free fluid in the right upper quadrant and some free fluid in the left upper quadrant as well as some organized fluid within the pelvis.  Concerning for hemoperitoneum in the postoperative state.  Patient was admitted to the surgical service under Dr. Yepez for ongoing evaluation and management.       FINAL IMPRESSION  1. Syncope, unspecified syncope type Active   2. Post-operative pain Active   3.  Hemoperitoneum      This dictation has been created using voice recognition software and/or scribes. The accuracy of the dictation is limited by the abilities of the software and the expertise of the scribes. I expect there may be some errors of grammar and possibly content. I made every attempt to manually correct the errors within my dictation. However, errors related to voice recognition software and/or scribes may still exist and should be interpreted within the appropriate context.

## 2021-01-04 NOTE — ED NOTES
Med rec completed per Pt at bedside.  Pt was prescribed Descovy but has not started this medication yet; Pt is currently still taking Truvada.  Pt takes Effexor XR 37.5 mg, 75 mg, and 150 mg for a total of 262.5 mg.; all three taken at same time every day.  Allergies reviewed with Pt. No known drug allergies.  No oral antibiotics in last 14 days.

## 2021-01-04 NOTE — ED NOTES
Med rec completed yesterday by med rec [employer]  Note from med rec tech   Med rec completed per Pt at bedside.  Pt was prescribed Descovy but has not started this medication yet; Pt is currently still taking Truvada.  Pt takes Effexor XR 37.5 mg, 75 mg, and 150 mg for a total of 262.5 mg.; all three taken at same time every day.  Allergies reviewed with Pt. No known drug allergies.  No oral antibiotics in last 14 days.

## 2021-01-04 NOTE — ED TRIAGE NOTES
Chief Complaint   Patient presents with   • GLF     x 2, cholecystectomy today, d/c from hospital this evening 1/3/21. Denies head/neck pain, no visible head trauma. tachycadia 140s     Pt BIB EMS for syncopal episodes x 2 after being discharged from inpatient stay for cholecystomy. Pt states LOC and that he fell into a door during one episode. No facial/head trauma noted. Pt presented to ED diaphoretic w/ R sided abd pain. Pt ate and drank very little post hospital discharge. A/o x 4 GCS 15 upon arrival to ED.     /81   Pulse (!) 107   Temp (!) 35.8 °C (96.4 °F) (Temporal)   Resp 20   Wt 105 kg (231 lb 7.7 oz)   SpO2 96%   BMI 30.54 kg/m²

## 2021-01-04 NOTE — CONSULTS
CHIEF COMPLAINT: cholelithiasis     HISTORY OF PRESENT ILLNESS:  The patient is a 30 year-old White man who presents to the Emergency Department a 8-10- hour history of moderate right upper quadrant  abdominal pain. The pain is associated with nausea. He denies any food intolerance or temporal relationship between symptoms and eating.  The patient denies any recent or intercurrent illness. The patient denies any recent or intercurrent illness. The patient has undergone appendectomy.    PAST MEDICAL HISTORY:  has a past medical history of Anxiety, Migraine, and OCD (obsessive compulsive disorder).    PAST SURGICAL HISTORY:  has a past surgical history that includes appendectomy ().     ALLERGIES: No Known Allergies     CURRENT MEDICATIONS:   Home Medications     Reviewed by Anton House (Pharmacy Tech) on 21 at 1905  Med List Status: Complete   Medication Last Dose Status   emtricitabine-TAF (DESCOVY) 200-25 mg Tab tablet New medication Active   emtricitabine-tenofovir (TRUVADA) 200-300 MG per tablet 2021 Active   mirtazapine (REMERON) 30 MG Tab tablet 2021 Active   Multiple Vitamins-Minerals (MENS MULTIVITAMIN) Tab 2021 Active   simethicone (MYLICON) 80 MG Chew Tab 1/3/2021 Active   venlafaxine (EFFEXOR-XR) 150 MG extended-release capsule 1/3/2021 Active   venlafaxine XR (EFFEXOR XR) 37.5 MG CAPSULE SR 24 HR 1/3/2021 Active   venlafaxine XR (EFFEXOR XR) 75 MG CAPSULE SR 24 HR 1/3/2021 Active                FAMILY HISTORY:   Family History   Problem Relation Age of Onset   • Alcohol/Drug Mother    • Alcohol/Drug Father        SOCIAL HISTORY:   Social History     Tobacco Use   • Smoking status: Former Smoker     Packs/day: 0.25     Types: Cigarettes     Quit date: 2015     Years since quittin.7   • Smokeless tobacco: Never Used   Substance and Sexual Activity   • Alcohol use: No     Alcohol/week: 0.0 oz   • Drug use: No   • Sexual activity: Not Currently     Partners: Male      "Birth control/protection: Condom       REVIEW OF SYSTEMS: Comprehensive review of systems is negative with the exception of the aforementioned HPI, PMH, and PSH bullets in accordance with CMS guidelines.     PHYSICAL EXAMINATION:   CONSTITUTIONAL:     Vital Signs: /85   Pulse 74   Temp 36.2 °C (97.1 °F) (Temporal)   Resp 16   Ht 1.854 m (6' 1\")   Wt 107.8 kg (237 lb 10.5 oz)   SpO2 96%    General Appearance: appears stated age.  HEAD AND NECK: The pupils are equal, round, and reactive to light bilaterally. The sclera are anicteric. Nares and oropharynx are clear.   NECK: Supple.   RESPIRATORY:   Inspection: Unlabored respirations, no intercostal retractions, paradoxical motion, or accessory muscle use.    CARDIOVASCULAR:   Inspection: The skin is warm.    ABDOMEN:   Inspection: Abdominal inspection reveals prior scars from lap appy..   Palpation: Palpation is remarkable for moderate tenderness in the right upper quadrant  region.   EXTREMITIES: Examination of the upper and lower extremities demonstrates no cyanosis edema or clubbing.  NEUROLOGIC: Alert & oriented x 3, Normal motor function, Normal sensory function, No focal deficits noted.       LABORATORY VALUES:   Recent Labs     01/03/21  1620   WBC 14.0*   RBC 5.52   HEMOGLOBIN 17.6   HEMATOCRIT 48.9   MCV 88.6   MCH 31.9   MCHC 36.0*   RDW 38.8   PLATELETCT 225   MPV 10.3     Recent Labs     01/03/21  1620   SODIUM 140   POTASSIUM 3.7   CHLORIDE 104   CO2 22   GLUCOSE 130*   BUN 7*   CREATININE 0.70   CALCIUM 10.2     Recent Labs     01/03/21  1620   ASTSGOT 23   ALTSGPT 31   TBILIRUBIN 0.4   ALKPHOSPHAT 88   GLOBULIN 3.3            IMAGING:   US-RUQ   Final Result      1.  There is cholelithiasis.      2.  Exam is otherwise within normal limits.      CT-RENAL COLIC EVALUATION(A/P W/O)   Final Result         No evidence of  urinary tract calculus or hydronephrosis.  No evidence of peritoneal inflammation.   Mild distention of the gallbladder is " noted.              IMPRESSION AND PLAN:  Acute cholecystitis with cholelithiasis    The patient has Grade II (moderate) cholelithiasis and acute cholecystitis. Plan: laparoscopic cholecystectomy.    The patient will be taken to the operating room for laparoscopic cholecystectomy. The surgical conduct was discussed in detail. Potential complications including, but not limited to, infection, bleeding, damage to adjacent structures, bile duct injury, need to convert to an open procedure, and anesthetic complications were discussed. Operative consent signed.      ____________________________________     Re Tracy M.D.    DD: 1/3/2021  7:28 PM

## 2021-01-04 NOTE — OP REPORT
DATE OF SERVICE:  01/03/2021     PREOPERATIVE DIAGNOSIS:  Acute cholecystitis with cholelithiasis.     POSTOPERATIVE DIAGNOSIS:  Acute cholecystitis with cholelithiasis.     PROCEDURE:  Laparoscopic cholecystectomy.     SURGEON:  Re Tracy MD     ANESTHESIA:  General endotracheal.     ANESTHESIOLOGIST:  Chuy Gonzalez MD     INDICATIONS:  The patient is a 30-year-old male who presents with a 12-hour   history of abdominal pain.  He was worked up and found to have cholelithiasis   as well as early cholecystitis.  He is being brought at this time for   laparoscopic cholecystectomy.     FINDINGS:  Acute cholecystitis with cholelithiasis.  Single duct and single   artery were identified.     DESCRIPTION OF PROCEDURE:  After the patient was identified and consented, he   was brought to the operating room and placed in supine position.  The patient   underwent general endotracheal anesthetic.  The patient's abdomen was prepped   and draped in sterile fashion.  After anesthetizing the periumbilical area   with 0.25% Marcaine, a 1 cm incision was made.  The abdominal wall was lifted   up.  A Veress needle was inserted into the abdominal cavity.  After positive   drop test, pneumoperitoneum was obtained, Veress needle was removed, and 5 mm   trocar was placed.  Under laparoscopic guidance, a 10 mm trocar was placed in   the epigastric position and two 5 mm trocars were placed in the subcostal   position.  The gallbladder was lifted up and demonstrated the triangle of   Calot.  The duct, artery, and surrounding tissues were doubly clipped and   transected.  The gallbladder was excised from the liver bed using   electrocautery.  It was ___ in the process of excision.  The patient was   extubated and taken to recovery in stable position.  All sponge and needle   counts were correct.        ______________________________  RE TRACY MD    H/YVAN/EFFIE    DD:  01/03/2021 21:13  DT:  01/03/2021 22:41    Job#:   [id number]    CC:SUZETTE DALTON MD

## 2021-01-04 NOTE — DISCHARGE PLANNING
Care Transition Team Assessment    Information Source  Orientation : Oriented x 4  Information Given By: Patient  Who is responsible for making decisions for patient? : Patient    Readmission Evaluation  Is this a readmission?: Yes - unplanned readmission  Why do you think you were readmitted?: (dizzy)  Was an appointment arranged for you prior to discharge?: Yes, did not attend appointment  Were there new prescriptions you were supposed to fill after you were discharged?: Yes, prescriptions filled  Did you understand your discharge instructions?: Yes  Did you have enough support after your last discharge?: Yes    Elopement Risk  Legal Hold: No  Ambulatory or Self Mobile in Wheelchair: No-Not an Elopement Risk    Interdisciplinary Discharge Planning  Does Admitting Nurse Feel This Could be a Complex Discharge?: No  Primary Care Physician: (JORGE ALBERTO CLARK M.D.)  Lives with - Patient's Self Care Capacity: Alone and Able to Care For Self  Support Systems: Friends / Neighbors  Housing / Facility: [address]  Do You Take your Prescribed Medications Regularly: Yes  Able to Return to Previous ADL's: Yes  Mobility Issues: No  Prior Services: None  Assistance Needed: No  Durable Medical Equipment: Not Applicable    Discharge Preparedness  What is your plan after discharge?: Uncertain - pending medical team collaboration  What are your discharge supports?: Partner  Prior Functional Level: Ambulatory  Difficulity with ADLs: None  Difficulity with IADLs: None    Functional Assesment  Prior Functional Level: Ambulatory    Finances  Financial Barriers to Discharge: No  Prescription Coverage: Yes         Values / Beliefs / Concerns  Values / Beliefs Concerns : No    Advance Directive  Advance Directive?: None  Advance Directive offered?: AD Booklet refused    Domestic Abuse  Have you ever been the victim of abuse or violence?: No         Discharge Risks or Barriers  Discharge risks or barriers?: No    Anticipated Discharge  Information  Discharge Disposition: Still a Patient (30)  Discharge Address: ([address])

## 2021-01-04 NOTE — ED NOTES
Spoke with Dr Tracy.  Notified that covid swab was collected and sent as ordered and her team is already aware.

## 2021-01-05 ENCOUNTER — ANESTHESIA (OUTPATIENT)
Dept: SURGERY | Facility: MEDICAL CENTER | Age: 31
End: 2021-01-05
Payer: COMMERCIAL

## 2021-01-05 ENCOUNTER — ANESTHESIA EVENT (OUTPATIENT)
Dept: SURGERY | Facility: MEDICAL CENTER | Age: 31
End: 2021-01-05
Payer: COMMERCIAL

## 2021-01-05 LAB
ALBUMIN SERPL BCP-MCNC: 3.1 G/DL (ref 3.2–4.9)
ALBUMIN/GLOB SERPL: 1.6 G/DL
ALP SERPL-CCNC: 54 U/L (ref 30–99)
ALT SERPL-CCNC: 45 U/L (ref 2–50)
ANION GAP SERPL CALC-SCNC: 6 MMOL/L (ref 7–16)
AST SERPL-CCNC: 31 U/L (ref 12–45)
BASOPHILS # BLD AUTO: 0.3 % (ref 0–1.8)
BASOPHILS # BLD: 0.03 K/UL (ref 0–0.12)
BILIRUB SERPL-MCNC: 0.3 MG/DL (ref 0.1–1.5)
BUN SERPL-MCNC: 9 MG/DL (ref 8–22)
CALCIUM SERPL-MCNC: 8.5 MG/DL (ref 8.5–10.5)
CHLORIDE SERPL-SCNC: 104 MMOL/L (ref 96–112)
CO2 SERPL-SCNC: 28 MMOL/L (ref 20–33)
CREAT SERPL-MCNC: 0.64 MG/DL (ref 0.5–1.4)
EOSINOPHIL # BLD AUTO: 0.11 K/UL (ref 0–0.51)
EOSINOPHIL NFR BLD: 1.1 % (ref 0–6.9)
ERYTHROCYTE [DISTWIDTH] IN BLOOD BY AUTOMATED COUNT: 41.5 FL (ref 35.9–50)
GLOBULIN SER CALC-MCNC: 2 G/DL (ref 1.9–3.5)
GLUCOSE SERPL-MCNC: 106 MG/DL (ref 65–99)
HCT VFR BLD AUTO: 25.4 % (ref 42–52)
HGB BLD-MCNC: 8.9 G/DL (ref 14–18)
IMM GRANULOCYTES # BLD AUTO: 0.04 K/UL (ref 0–0.11)
IMM GRANULOCYTES NFR BLD AUTO: 0.4 % (ref 0–0.9)
LYMPHOCYTES # BLD AUTO: 3.13 K/UL (ref 1–4.8)
LYMPHOCYTES NFR BLD: 31 % (ref 22–41)
MCH RBC QN AUTO: 32.1 PG (ref 27–33)
MCHC RBC AUTO-ENTMCNC: 35 G/DL (ref 33.7–35.3)
MCV RBC AUTO: 91.7 FL (ref 81.4–97.8)
MONOCYTES # BLD AUTO: 0.77 K/UL (ref 0–0.85)
MONOCYTES NFR BLD AUTO: 7.6 % (ref 0–13.4)
NEUTROPHILS # BLD AUTO: 6.02 K/UL (ref 1.82–7.42)
NEUTROPHILS NFR BLD: 59.6 % (ref 44–72)
NRBC # BLD AUTO: 0 K/UL
NRBC BLD-RTO: 0 /100 WBC
PLATELET # BLD AUTO: 160 K/UL (ref 164–446)
PMV BLD AUTO: 10.3 FL (ref 9–12.9)
POTASSIUM SERPL-SCNC: 3.8 MMOL/L (ref 3.6–5.5)
PROT SERPL-MCNC: 5.1 G/DL (ref 6–8.2)
RBC # BLD AUTO: 2.77 M/UL (ref 4.7–6.1)
SODIUM SERPL-SCNC: 138 MMOL/L (ref 135–145)
WBC # BLD AUTO: 10.1 K/UL (ref 4.8–10.8)

## 2021-01-05 PROCEDURE — 700111 HCHG RX REV CODE 636 W/ 250 OVERRIDE (IP): Performed by: SURGERY

## 2021-01-05 PROCEDURE — 160036 HCHG PACU - EA ADDL 30 MINS PHASE I: Performed by: SURGERY

## 2021-01-05 PROCEDURE — 501583 HCHG TROCAR, THRD CAN&SEAL 5X100: Performed by: SURGERY

## 2021-01-05 PROCEDURE — 700102 HCHG RX REV CODE 250 W/ 637 OVERRIDE(OP): Performed by: STUDENT IN AN ORGANIZED HEALTH CARE EDUCATION/TRAINING PROGRAM

## 2021-01-05 PROCEDURE — 501577 HCHG TROCAR, STEP 11MM: Performed by: SURGERY

## 2021-01-05 PROCEDURE — 502571 HCHG PACK, LAP CHOLE: Performed by: SURGERY

## 2021-01-05 PROCEDURE — 96376 TX/PRO/DX INJ SAME DRUG ADON: CPT

## 2021-01-05 PROCEDURE — 501570 HCHG TROCAR, SEPARATOR: Performed by: SURGERY

## 2021-01-05 PROCEDURE — 160009 HCHG ANES TIME/MIN: Performed by: SURGERY

## 2021-01-05 PROCEDURE — 51798 US URINE CAPACITY MEASURE: CPT

## 2021-01-05 PROCEDURE — 700111 HCHG RX REV CODE 636 W/ 250 OVERRIDE (IP): Performed by: STUDENT IN AN ORGANIZED HEALTH CARE EDUCATION/TRAINING PROGRAM

## 2021-01-05 PROCEDURE — 501838 HCHG SUTURE GENERAL: Performed by: SURGERY

## 2021-01-05 PROCEDURE — 160002 HCHG RECOVERY MINUTES (STAT): Performed by: SURGERY

## 2021-01-05 PROCEDURE — 80053 COMPREHEN METABOLIC PANEL: CPT

## 2021-01-05 PROCEDURE — 500868 HCHG NEEDLE, SURGI(VARES): Performed by: SURGERY

## 2021-01-05 PROCEDURE — 96375 TX/PRO/DX INJ NEW DRUG ADDON: CPT

## 2021-01-05 PROCEDURE — 160028 HCHG SURGERY MINUTES - 1ST 30 MINS LEVEL 3: Performed by: SURGERY

## 2021-01-05 PROCEDURE — 501582 HCHG TROCAR, THRD BLADED: Performed by: SURGERY

## 2021-01-05 PROCEDURE — 700102 HCHG RX REV CODE 250 W/ 637 OVERRIDE(OP): Performed by: SURGERY

## 2021-01-05 PROCEDURE — 700105 HCHG RX REV CODE 258: Performed by: SURGERY

## 2021-01-05 PROCEDURE — 700101 HCHG RX REV CODE 250: Performed by: STUDENT IN AN ORGANIZED HEALTH CARE EDUCATION/TRAINING PROGRAM

## 2021-01-05 PROCEDURE — 160039 HCHG SURGERY MINUTES - EA ADDL 1 MIN LEVEL 3: Performed by: SURGERY

## 2021-01-05 PROCEDURE — 85025 COMPLETE CBC W/AUTO DIFF WBC: CPT

## 2021-01-05 PROCEDURE — 500378 HCHG DRAIN, J-VAC ROUND 19FR: Performed by: SURGERY

## 2021-01-05 PROCEDURE — A9270 NON-COVERED ITEM OR SERVICE: HCPCS | Performed by: SURGERY

## 2021-01-05 PROCEDURE — A9270 NON-COVERED ITEM OR SERVICE: HCPCS | Performed by: STUDENT IN AN ORGANIZED HEALTH CARE EDUCATION/TRAINING PROGRAM

## 2021-01-05 PROCEDURE — 160035 HCHG PACU - 1ST 60 MINS PHASE I: Performed by: SURGERY

## 2021-01-05 PROCEDURE — G0378 HOSPITAL OBSERVATION PER HR: HCPCS

## 2021-01-05 PROCEDURE — 160048 HCHG OR STATISTICAL LEVEL 1-5: Performed by: SURGERY

## 2021-01-05 PROCEDURE — 501399 HCHG SPECIMAN BAG, ENDO CATC: Performed by: SURGERY

## 2021-01-05 RX ORDER — BUPIVACAINE HYDROCHLORIDE AND EPINEPHRINE 5; 5 MG/ML; UG/ML
INJECTION, SOLUTION EPIDURAL; INTRACAUDAL; PERINEURAL
Status: DISCONTINUED | OUTPATIENT
Start: 2021-01-05 | End: 2021-01-05 | Stop reason: HOSPADM

## 2021-01-05 RX ORDER — DEXAMETHASONE SODIUM PHOSPHATE 4 MG/ML
INJECTION, SOLUTION INTRA-ARTICULAR; INTRALESIONAL; INTRAMUSCULAR; INTRAVENOUS; SOFT TISSUE PRN
Status: DISCONTINUED | OUTPATIENT
Start: 2021-01-05 | End: 2021-01-05 | Stop reason: SURG

## 2021-01-05 RX ORDER — CEFAZOLIN SODIUM 1 G/3ML
INJECTION, POWDER, FOR SOLUTION INTRAMUSCULAR; INTRAVENOUS PRN
Status: DISCONTINUED | OUTPATIENT
Start: 2021-01-05 | End: 2021-01-05 | Stop reason: SURG

## 2021-01-05 RX ORDER — SODIUM CHLORIDE 9 MG/ML
INJECTION, SOLUTION INTRAVENOUS CONTINUOUS
Status: DISCONTINUED | OUTPATIENT
Start: 2021-01-05 | End: 2021-01-05

## 2021-01-05 RX ORDER — HYDROMORPHONE HYDROCHLORIDE 1 MG/ML
0.4 INJECTION, SOLUTION INTRAMUSCULAR; INTRAVENOUS; SUBCUTANEOUS
Status: DISCONTINUED | OUTPATIENT
Start: 2021-01-05 | End: 2021-01-05 | Stop reason: HOSPADM

## 2021-01-05 RX ORDER — NEOSTIGMINE METHYLSULFATE 1 MG/ML
INJECTION, SOLUTION INTRAVENOUS PRN
Status: DISCONTINUED | OUTPATIENT
Start: 2021-01-05 | End: 2021-01-05 | Stop reason: SURG

## 2021-01-05 RX ORDER — HYDROMORPHONE HYDROCHLORIDE 1 MG/ML
0.1 INJECTION, SOLUTION INTRAMUSCULAR; INTRAVENOUS; SUBCUTANEOUS
Status: DISCONTINUED | OUTPATIENT
Start: 2021-01-05 | End: 2021-01-05 | Stop reason: HOSPADM

## 2021-01-05 RX ORDER — DIPHENHYDRAMINE HYDROCHLORIDE 50 MG/ML
12.5 INJECTION INTRAMUSCULAR; INTRAVENOUS
Status: DISCONTINUED | OUTPATIENT
Start: 2021-01-05 | End: 2021-01-05 | Stop reason: HOSPADM

## 2021-01-05 RX ORDER — LIDOCAINE HYDROCHLORIDE 20 MG/ML
INJECTION, SOLUTION EPIDURAL; INFILTRATION; INTRACAUDAL; PERINEURAL PRN
Status: DISCONTINUED | OUTPATIENT
Start: 2021-01-05 | End: 2021-01-05 | Stop reason: SURG

## 2021-01-05 RX ORDER — SODIUM CHLORIDE, SODIUM LACTATE, POTASSIUM CHLORIDE, CALCIUM CHLORIDE 600; 310; 30; 20 MG/100ML; MG/100ML; MG/100ML; MG/100ML
INJECTION, SOLUTION INTRAVENOUS CONTINUOUS
Status: DISCONTINUED | OUTPATIENT
Start: 2021-01-05 | End: 2021-01-05 | Stop reason: HOSPADM

## 2021-01-05 RX ORDER — ROCURONIUM BROMIDE 10 MG/ML
INJECTION, SOLUTION INTRAVENOUS PRN
Status: DISCONTINUED | OUTPATIENT
Start: 2021-01-05 | End: 2021-01-05 | Stop reason: SURG

## 2021-01-05 RX ORDER — MEPERIDINE HYDROCHLORIDE 25 MG/ML
12.5 INJECTION INTRAMUSCULAR; INTRAVENOUS; SUBCUTANEOUS
Status: DISCONTINUED | OUTPATIENT
Start: 2021-01-05 | End: 2021-01-05 | Stop reason: HOSPADM

## 2021-01-05 RX ORDER — SUCCINYLCHOLINE/SOD CL,ISO/PF 200MG/10ML
SYRINGE (ML) INTRAVENOUS PRN
Status: DISCONTINUED | OUTPATIENT
Start: 2021-01-05 | End: 2021-01-05 | Stop reason: SURG

## 2021-01-05 RX ORDER — MIDAZOLAM HYDROCHLORIDE 1 MG/ML
INJECTION INTRAMUSCULAR; INTRAVENOUS PRN
Status: DISCONTINUED | OUTPATIENT
Start: 2021-01-05 | End: 2021-01-05 | Stop reason: SURG

## 2021-01-05 RX ORDER — HYDROMORPHONE HYDROCHLORIDE 1 MG/ML
0.2 INJECTION, SOLUTION INTRAMUSCULAR; INTRAVENOUS; SUBCUTANEOUS
Status: DISCONTINUED | OUTPATIENT
Start: 2021-01-05 | End: 2021-01-05 | Stop reason: HOSPADM

## 2021-01-05 RX ORDER — ONDANSETRON 2 MG/ML
4 INJECTION INTRAMUSCULAR; INTRAVENOUS
Status: DISCONTINUED | OUTPATIENT
Start: 2021-01-05 | End: 2021-01-05 | Stop reason: HOSPADM

## 2021-01-05 RX ORDER — OXYCODONE HCL 5 MG/5 ML
10 SOLUTION, ORAL ORAL
Status: COMPLETED | OUTPATIENT
Start: 2021-01-05 | End: 2021-01-05

## 2021-01-05 RX ORDER — HALOPERIDOL 5 MG/ML
1 INJECTION INTRAMUSCULAR
Status: DISCONTINUED | OUTPATIENT
Start: 2021-01-05 | End: 2021-01-05 | Stop reason: HOSPADM

## 2021-01-05 RX ORDER — OXYCODONE HCL 5 MG/5 ML
5 SOLUTION, ORAL ORAL
Status: COMPLETED | OUTPATIENT
Start: 2021-01-05 | End: 2021-01-05

## 2021-01-05 RX ORDER — ONDANSETRON 2 MG/ML
INJECTION INTRAMUSCULAR; INTRAVENOUS PRN
Status: DISCONTINUED | OUTPATIENT
Start: 2021-01-05 | End: 2021-01-05 | Stop reason: SURG

## 2021-01-05 RX ORDER — GLYCOPYRROLATE 0.2 MG/ML
INJECTION INTRAMUSCULAR; INTRAVENOUS PRN
Status: DISCONTINUED | OUTPATIENT
Start: 2021-01-05 | End: 2021-01-05 | Stop reason: SURG

## 2021-01-05 RX ADMIN — DOCUSATE SODIUM 100 MG: 100 CAPSULE, LIQUID FILLED ORAL at 06:13

## 2021-01-05 RX ADMIN — ACETAMINOPHEN 1000 MG: 500 TABLET ORAL at 12:30

## 2021-01-05 RX ADMIN — GLYCOPYRROLATE 0.4 MG: 0.2 INJECTION INTRAMUSCULAR; INTRAVENOUS at 09:49

## 2021-01-05 RX ADMIN — PROPOFOL 160 MG: 10 INJECTION, EMULSION INTRAVENOUS at 08:52

## 2021-01-05 RX ADMIN — FENTANYL CITRATE 50 MCG: 50 INJECTION, SOLUTION INTRAMUSCULAR; INTRAVENOUS at 08:49

## 2021-01-05 RX ADMIN — ACETAMINOPHEN 1000 MG: 500 TABLET ORAL at 23:21

## 2021-01-05 RX ADMIN — HYDROMORPHONE HYDROCHLORIDE 0.4 MG: 1 INJECTION, SOLUTION INTRAMUSCULAR; INTRAVENOUS; SUBCUTANEOUS at 10:22

## 2021-01-05 RX ADMIN — FENTANYL CITRATE 50 MCG: 50 INJECTION, SOLUTION INTRAMUSCULAR; INTRAVENOUS at 09:00

## 2021-01-05 RX ADMIN — DEXAMETHASONE SODIUM PHOSPHATE 4 MG: 4 INJECTION, SOLUTION INTRA-ARTICULAR; INTRALESIONAL; INTRAMUSCULAR; INTRAVENOUS; SOFT TISSUE at 08:59

## 2021-01-05 RX ADMIN — MIDAZOLAM HYDROCHLORIDE 2 MG: 1 INJECTION, SOLUTION INTRAMUSCULAR; INTRAVENOUS at 08:46

## 2021-01-05 RX ADMIN — LIDOCAINE HYDROCHLORIDE 80 MG: 20 INJECTION, SOLUTION EPIDURAL; INFILTRATION; INTRACAUDAL at 08:52

## 2021-01-05 RX ADMIN — FAMOTIDINE 20 MG: 20 TABLET, FILM COATED ORAL at 06:13

## 2021-01-05 RX ADMIN — OXYCODONE HYDROCHLORIDE 10 MG: 10 TABLET ORAL at 16:14

## 2021-01-05 RX ADMIN — SODIUM CHLORIDE, POTASSIUM CHLORIDE, SODIUM LACTATE AND CALCIUM CHLORIDE: 600; 310; 30; 20 INJECTION, SOLUTION INTRAVENOUS at 20:19

## 2021-01-05 RX ADMIN — FENTANYL CITRATE 50 MCG: 50 INJECTION, SOLUTION INTRAMUSCULAR; INTRAVENOUS at 09:36

## 2021-01-05 RX ADMIN — FENTANYL CITRATE 50 MCG: 50 INJECTION, SOLUTION INTRAMUSCULAR; INTRAVENOUS at 10:04

## 2021-01-05 RX ADMIN — NEOSTIGMINE METHYLSULFATE 3 MG: 1 INJECTION INTRAVENOUS at 09:49

## 2021-01-05 RX ADMIN — CEFAZOLIN 2 G: 330 INJECTION, POWDER, FOR SOLUTION INTRAMUSCULAR; INTRAVENOUS at 08:55

## 2021-01-05 RX ADMIN — MORPHINE SULFATE 4 MG: 4 INJECTION INTRAVENOUS at 21:35

## 2021-01-05 RX ADMIN — Medication 100 MG: at 08:52

## 2021-01-05 RX ADMIN — VENLAFAXINE HYDROCHLORIDE 262.5 MG: 75 CAPSULE, EXTENDED RELEASE ORAL at 12:34

## 2021-01-05 RX ADMIN — ONDANSETRON 4 MG: 2 INJECTION INTRAMUSCULAR; INTRAVENOUS at 09:38

## 2021-01-05 RX ADMIN — OXYCODONE 5 MG: 5 TABLET ORAL at 20:24

## 2021-01-05 RX ADMIN — ACETAMINOPHEN 1000 MG: 500 TABLET ORAL at 06:13

## 2021-01-05 RX ADMIN — ROCURONIUM BROMIDE 50 MG: 10 INJECTION, SOLUTION INTRAVENOUS at 08:52

## 2021-01-05 RX ADMIN — MIRTAZAPINE 30 MG: 30 TABLET, FILM COATED ORAL at 20:22

## 2021-01-05 RX ADMIN — SODIUM CHLORIDE, POTASSIUM CHLORIDE, SODIUM LACTATE AND CALCIUM CHLORIDE: 600; 310; 30; 20 INJECTION, SOLUTION INTRAVENOUS at 06:13

## 2021-01-05 RX ADMIN — OXYCODONE HYDROCHLORIDE 10 MG: 5 SOLUTION ORAL at 10:19

## 2021-01-05 RX ADMIN — DOCUSATE SODIUM 100 MG: 100 CAPSULE, LIQUID FILLED ORAL at 17:54

## 2021-01-05 RX ADMIN — MORPHINE SULFATE 4 MG: 4 INJECTION INTRAVENOUS at 16:56

## 2021-01-05 RX ADMIN — DOCUSATE SODIUM 50 MG AND SENNOSIDES 8.6 MG 1 TABLET: 8.6; 5 TABLET, FILM COATED ORAL at 20:22

## 2021-01-05 RX ADMIN — ACETAMINOPHEN 1000 MG: 500 TABLET ORAL at 17:54

## 2021-01-05 RX ADMIN — MORPHINE SULFATE 4 MG: 4 INJECTION INTRAVENOUS at 12:37

## 2021-01-05 RX ADMIN — OXYCODONE HYDROCHLORIDE 10 MG: 10 TABLET ORAL at 12:30

## 2021-01-05 RX ADMIN — HYDROMORPHONE HYDROCHLORIDE 0.2 MG: 1 INJECTION, SOLUTION INTRAMUSCULAR; INTRAVENOUS; SUBCUTANEOUS at 10:39

## 2021-01-05 RX ADMIN — OXYCODONE 5 MG: 5 TABLET ORAL at 06:15

## 2021-01-05 RX ADMIN — HYDROMORPHONE HYDROCHLORIDE 0.4 MG: 1 INJECTION, SOLUTION INTRAMUSCULAR; INTRAVENOUS; SUBCUTANEOUS at 10:33

## 2021-01-05 ASSESSMENT — PAIN DESCRIPTION - PAIN TYPE
TYPE: ACUTE PAIN
TYPE: ACUTE PAIN;SURGICAL PAIN
TYPE: ACUTE PAIN

## 2021-01-05 ASSESSMENT — PATIENT HEALTH QUESTIONNAIRE - PHQ9
2. FEELING DOWN, DEPRESSED, IRRITABLE, OR HOPELESS: NOT AT ALL
SUM OF ALL RESPONSES TO PHQ9 QUESTIONS 1 AND 2: 0
1. LITTLE INTEREST OR PLEASURE IN DOING THINGS: NOT AT ALL

## 2021-01-05 ASSESSMENT — FIBROSIS 4 INDEX: FIB4 SCORE: 0.87

## 2021-01-05 NOTE — OP REPORT
DATE OF OPERATION: 1/5/2021    PREOPERATIVE DIAGNOSIS: Postoperative hemorrhage.    POSTOPERATIVE DIAGNOSIS: Same.    PROCEDURE PERFORMED: Exploratory laparoscopy, evacuation of hematoma, control of hemorrhage from umbilical port site    SURGEON: Geraldo Yepez M.D.    ANESTHESIOLOGIST: Uriah Buchanan M.D.    ANESTHESIA: General endotracheal anesthesia.    ASA CLASSIFICATION: II. Emergent.    INDICATIONS: The patient is a 30 year-old man with a persistent postoperative hemorrhage following a laparoscopic cholecystectomy 2 days ago.  He is taken the operating room for laparoscopic exploration.    FINDINGS: 1000 mL of clot and blood within the abdominal cavity.  No active hemorrhage identified.  No hemorrhage from the gallbladder fossa.  Peritoneal bruising and discoloration at the right lateral and umbilical port sites.  Large amount of clot beneath the umbilical port site.    WOUND CLASSIFICATION: Class II, Clean Contaminated.    SPECIMEN: None.    ESTIMATED BLOOD LOSS: 1100 mL.    PROCEDURE: Following informed consent consent, the patient was properly identified, taken to the operating room and placed in supine position where general endotracheal anesthesia was administered. Intravenous antibiotics were administered by the anesthesiologist in correct time interval. The patient voided prior to surgery. A urinary catheter was not placed. Sequential compression devices were employed. The abdomen was prepped and draped into a sterile field.     The fascia at the umbilical port site was elevated with a hook and a Veress needle was atraumatically inserted. Carbon dioxide pneumoperitoneum was instilled. An 11 mm separator port was passed. A 10 mm 30 degree lens and camera was passed into the peritoneal cavity. An 11 mm port was placed in the epigastric port site under direct vision. A 5 mm right lateral subcostal port was placed under direct vision.     The abdomen was inspected with the findings as detailed above.   The old blood and clot was completely evacuated from the abdominal cavity.  The abdominal cavity was irrigated with multiple liters of sterile warm saline.     The gallbladder fossa was inspected. Hemostasis was satisfactory. A 10 mm round channel drain was placed within the right upper quadrant and secured to the skin at the right lateral port site with a 3-0 nonabsorbable monofilament suture. The drain was connected to closed bulb suction.     The umbilical port site fascia was approximated using a trocar site closure device with a 0 VICRYL® Plus Antibacterial suture. The abdomen was desufflated and the ports were removed.  The port site skin incisions were closed with interrupted 4-0 VICRYL® Plus Antibacterial subcuticular sutures. Steri-Strips with Compound Benzoin Tincture were applied beneath a sterile dressing.    The patient tolerated the procedure well, and there were no apparent complications. All sponge, needle, and instrument counts were correct on 2 separate occasions. The patient was awakened, extubated, and transferred to  to the post anesthesia care unit (PACU) in satisfactory condition.       ____________________________________     Geraldo Yepez M.D.    DD: 1/5/2021  10:28 AM

## 2021-01-05 NOTE — PROGRESS NOTES
Returned to floor from PACU   Pt is A&Ox4, drowsy   Reports soreness around umbilical area, will address per MAR.   On 2L O2 NC, denies SOB or chest pain   Hypoactive BS x 4. Remains NPO at this time, will verify order with MD. Denies N&V   - flatus - BM   - void at this time. Urinal at bedside.   RACHELL drain noted to abdomen with small, sanguinous output.   Dressing in place over umbilical lap site, CDI   PIV running IVF  Updated on POC. Belongings and call light within reach. All needs met at this time.

## 2021-01-05 NOTE — OR NURSING
Pt recovered well. Had some pain in abdomen that was managed with medication, denies nausea tolerating sips of water. Pt is A&Ox4, VSS, incision is CDI with RACHELL drain present. Sister was called and updated on pt status and plan to return to room shortly. Pt has earrings in bag in chart, no other belongings. Pt has been resting comfortably in bed in no acute distress, able to make needs known.    Pt to be transported to room on 2L O2 via NC. Tank on bed is full.

## 2021-01-05 NOTE — PROGRESS NOTES
Bedside report received.  Assessment complete.  A&O x 4. Patient calls appropriately.  Patient ambulates with x1 assist. Bed alarm off.   Patient has 2/10 pain. Pain managed with prescribed medications.  Denies N&V. Tolerating full liquid diet.  Surgical dressings CDI.  + void into urinal, + flatus, - BM.  Patient denies SOB.  SCD's off.    Review plan with of care with patient. Call light and personal belongings with in reach. Hourly rounding in place. All needs met at this time.

## 2021-01-05 NOTE — PROGRESS NOTES
Patient unable to void. Pain addressed per MAR so patient could mobilize better, however patient was unable to void upon standing at edge of bed and mobilizing x 2. Pt was reporting bladder distension and frustration with inability to pee. Bladder scan revealed > 900 ml of urine in bladder. Per standing orders, if bladder scan > 600 ml, to place Colunga catheter. Colunga catheter placed with patient's permission following education. Pt tolerated procedure well.

## 2021-01-05 NOTE — PROGRESS NOTES
Oriented to room call light and smoking policy.  Reviewed plan of care (equipment, incentive spirometer, sequential compression devices, medications, activity, diet, fall precautions, skin care, and pain) with patient.    Patient is A&Ox4  Reports abdominal pain   Reports weakness and dizziness upon mobilizing out of bed.   On RA, denies SOB or chest pain   Normoactive BS x 4. NPO at this time. Denies N&V   Abdominal distension and rebound tenderness noted.   - flatus - BM   - void. Pt required straight catheterization in the ED d/t difficulty voiding.   Abdominal lap sites x 4 with gauze and tegaderm.   PIV running IVF  Updated on POC. Belongings and call light within reach. All needs met at this time.     2 RN skin check -   Abdominal lap sites x 4   Skin over bony prominences intact

## 2021-01-05 NOTE — ANESTHESIA POSTPROCEDURE EVALUATION
Patient: Justice Infante    Procedure Summary     Date: 01/05/21 Room / Location: [place] [address] / SURGERY [facility]    Anesthesia Start: 0845 Anesthesia Stop: 1002    Procedure: LAPAROSCOPY-DIAGNOSTIC removal ov clott and controle of hemorrhage (N/A Abdomen) Diagnosis:       Hemorrhage      (postoperative hemmorage)    Surgeons: Geraldo Yepez M.D. Responsible Provider: Uriha Buchanan M.D.    Anesthesia Type: general ASA Status: 2 - Emergent          Final Anesthesia Type: general  Last vitals  BP   Blood Pressure: 156/65    Temp   36.1 °C (96.9 °F)    Pulse   Pulse: 92   Resp   12    SpO2   99 %      Anesthesia Post Evaluation    Patient location during evaluation: PACU  Patient participation: complete - patient participated  Level of consciousness: awake and alert    Airway patency: patent  Anesthetic complications: no  Cardiovascular status: hemodynamically stable  Respiratory status: acceptable  Hydration status: euvolemic    PONV: none           Nurse Pain Score: 4 (NPRS)

## 2021-01-05 NOTE — PROGRESS NOTES
DATE: 1/5/2021    Post Operative Day  2 laparoscopic cholecystectomy.    Interval Events:  Ongoing hemorrhage.    PHYSICAL EXAMINATION:  Constitutional:     Vital Signs: /57   Pulse 73   Temp 36.6 °C (97.8 °F) (Temporal)   Resp 17   Wt 105 kg (231 lb 7.7 oz)   SpO2 93%    Respiratory:   Inspection: Unlabored respirations, no intercostal retractions, paradoxical motion, or accessory muscle use.   Palpation:  The chest is nontender.    Auscultation: clear to auscultation.  Cardiovascular:   Inspection: The skin is cool.  Auscultation: Sinus tachycardia.   Peripheral Pulses: Normal.   Abdomen:   Inspection: Abdominal inspection reveals mild abdominal distension.   Palpation: Palpation is remarkable for mild tenderness in the right subcostal region.    Laboratory Values:   Recent Labs     01/03/21 1620 01/04/21  0210 01/04/21  0645 01/04/21  0645 01/04/21  1209 01/04/21  1824 01/05/21  0109   WBC 14.0* 20.5*  --   --   --   --  10.1   RBC 5.52 4.60*  --   --   --   --  2.77*   HEMOGLOBIN 17.6 14.2 12.4*   < > 10.0* 10.4* 8.9*   HEMATOCRIT 48.9 41.3* 35.3*  --   --   --  25.4*   MCV 88.6 89.8  --   --   --   --  91.7   MCH 31.9 30.9  --   --   --   --  32.1   MCHC 36.0* 34.4  --   --   --   --  35.0   RDW 38.8 40.2  --   --   --   --  41.5   PLATELETCT 225 315  --   --   --   --  160*   MPV 10.3 10.7  --   --   --   --  10.3    < > = values in this interval not displayed.     Recent Labs     01/03/21 1620 01/04/21 0210 01/05/21  0109   SODIUM 140 136 138   POTASSIUM 3.7 4.3 3.8   CHLORIDE 104 101 104   CO2 22 21 28   GLUCOSE 130* 245* 106*   BUN 7* 9 9   CREATININE 0.70 0.93 0.64   CALCIUM 10.2 8.7 8.5     Recent Labs     01/03/21 1620 01/04/21  0210 01/04/21  0807 01/05/21  0109   ASTSGOT 23 35  --  31   ALTSGPT 31 42  --  45   TBILIRUBIN 0.4 0.5  --  0.3   ALKPHOSPHAT 88 77  --  54   GLOBULIN 3.3 2.7  --  2.0   INR  --   --  1.07  --      Recent Labs     01/04/21  0807   APTT 26.5   INR 1.07         Imaging:   US-ABDOMEN [employer] LTD (FOR ED USE ONLY)   Final Result      Free fluid seen within the upper abdomen and complex fluid seen within the pelvis. These findings are worrisome for intraperitoneal hemorrhage.      This was discussed with MAC ENCARNACION at 6:55 AM on 1/4/2021.                ASSESSMENT AND PLAN:     DISPOSITION: To surgery for exploratory laparoscopy, evacuation of clot, control of hemorrhage, and possible open laparotomy. I have seen and examined the patient today.  Critical physical examination findings, laboratory indices, and radiographic studies reviewed. The operative strategy was explained and reviewed. Alternatives discussed. Potential complications including, but not limited to, infection, bleeding, damage to adjacent structures, and anesthetic complications were discussed. Questions were elicited and answered to the patient's satisfaction.  Operative consent signed.  Admission SARS-CoV-2 testing negative. LOW RISK patient. Repeat SARS-CoV-2 testing not indicated. Isolation precautions de-escalated.         ____________________________________     Geraldo Yepez M.D.    DD: 1/5/2021  7:19 AM

## 2021-01-05 NOTE — ANESTHESIA TIME REPORT
Anesthesia Start and Stop Event Times     Date Time Event    1/5/2021 0845 Ready for Procedure     0845 Anesthesia Start     1002 Anesthesia Stop        Responsible Staff  01/05/21    Name Role Begin End    Uriah Buchanan M.D. Anesth 0845 1002        Preop Diagnosis (Free Text):  Pre-op Diagnosis     Post operative hemorrage        Preop Diagnosis (Codes):  Diagnosis Information     Diagnosis Code(s): Hemorrhage [R58]        Post op Diagnosis  Hemorrhage intraabdominal      Premium Reason  Non-Premium    Comments:

## 2021-01-05 NOTE — ANESTHESIA PREPROCEDURE EVALUATION
Obesity, anxiety    Relevant Problems   No relevant active problems       Physical Exam    Airway   Mallampati: II  TM distance: >3 FB  Neck ROM: full       Cardiovascular - normal exam  Rhythm: regular  Rate: normal  (-) murmur     Dental - normal exam           Pulmonary - normal exam  Breath sounds clear to auscultation     Abdominal    Neurological - normal exam                 Anesthesia Plan    ASA 2- EMERGENT   ASA physical status emergent criteria: acute hemorrhage    Plan - general       Airway plan will be ETT        Induction: intravenous    Postoperative Plan: Postoperative administration of opioids is intended.    Pertinent diagnostic labs and testing reviewed    Informed Consent:    Anesthetic plan and risks discussed with patient.    Use of blood products discussed with: patient whom consented to blood products.

## 2021-01-06 LAB
BASOPHILS # BLD AUTO: 0.3 % (ref 0–1.8)
BASOPHILS # BLD AUTO: 0.3 % (ref 0–1.8)
BASOPHILS # BLD: 0.02 K/UL (ref 0–0.12)
BASOPHILS # BLD: 0.02 K/UL (ref 0–0.12)
EOSINOPHIL # BLD AUTO: 0.05 K/UL (ref 0–0.51)
EOSINOPHIL # BLD AUTO: 0.07 K/UL (ref 0–0.51)
EOSINOPHIL NFR BLD: 0.6 % (ref 0–6.9)
EOSINOPHIL NFR BLD: 1.1 % (ref 0–6.9)
ERYTHROCYTE [DISTWIDTH] IN BLOOD BY AUTOMATED COUNT: 40.5 FL (ref 35.9–50)
ERYTHROCYTE [DISTWIDTH] IN BLOOD BY AUTOMATED COUNT: 41.5 FL (ref 35.9–50)
HCT VFR BLD AUTO: 25 % (ref 42–52)
HCT VFR BLD AUTO: 26.4 % (ref 42–52)
HGB BLD-MCNC: 8.5 G/DL (ref 14–18)
HGB BLD-MCNC: 9.1 G/DL (ref 14–18)
IMM GRANULOCYTES # BLD AUTO: 0.03 K/UL (ref 0–0.11)
IMM GRANULOCYTES # BLD AUTO: 0.08 K/UL (ref 0–0.11)
IMM GRANULOCYTES NFR BLD AUTO: 0.4 % (ref 0–0.9)
IMM GRANULOCYTES NFR BLD AUTO: 1.2 % (ref 0–0.9)
LYMPHOCYTES # BLD AUTO: 2.12 K/UL (ref 1–4.8)
LYMPHOCYTES # BLD AUTO: 2.32 K/UL (ref 1–4.8)
LYMPHOCYTES NFR BLD: 27.4 % (ref 22–41)
LYMPHOCYTES NFR BLD: 35.5 % (ref 22–41)
MCH RBC QN AUTO: 31.1 PG (ref 27–33)
MCH RBC QN AUTO: 31.4 PG (ref 27–33)
MCHC RBC AUTO-ENTMCNC: 34 G/DL (ref 33.7–35.3)
MCHC RBC AUTO-ENTMCNC: 34.5 G/DL (ref 33.7–35.3)
MCV RBC AUTO: 90.1 FL (ref 81.4–97.8)
MCV RBC AUTO: 92.3 FL (ref 81.4–97.8)
MONOCYTES # BLD AUTO: 0.51 K/UL (ref 0–0.85)
MONOCYTES # BLD AUTO: 0.57 K/UL (ref 0–0.85)
MONOCYTES NFR BLD AUTO: 7.4 % (ref 0–13.4)
MONOCYTES NFR BLD AUTO: 7.8 % (ref 0–13.4)
NEUTROPHILS # BLD AUTO: 3.53 K/UL (ref 1.82–7.42)
NEUTROPHILS # BLD AUTO: 4.94 K/UL (ref 1.82–7.42)
NEUTROPHILS NFR BLD: 54.1 % (ref 44–72)
NEUTROPHILS NFR BLD: 63.9 % (ref 44–72)
NRBC # BLD AUTO: 0 K/UL
NRBC # BLD AUTO: 0 K/UL
NRBC BLD-RTO: 0 /100 WBC
NRBC BLD-RTO: 0 /100 WBC
PLATELET # BLD AUTO: 149 K/UL (ref 164–446)
PLATELET # BLD AUTO: 170 K/UL (ref 164–446)
PMV BLD AUTO: 10.3 FL (ref 9–12.9)
PMV BLD AUTO: 10.5 FL (ref 9–12.9)
RBC # BLD AUTO: 2.71 M/UL (ref 4.7–6.1)
RBC # BLD AUTO: 2.93 M/UL (ref 4.7–6.1)
WBC # BLD AUTO: 6.5 K/UL (ref 4.8–10.8)
WBC # BLD AUTO: 7.7 K/UL (ref 4.8–10.8)

## 2021-01-06 PROCEDURE — 85025 COMPLETE CBC W/AUTO DIFF WBC: CPT | Mod: 91

## 2021-01-06 PROCEDURE — A9270 NON-COVERED ITEM OR SERVICE: HCPCS | Performed by: SURGERY

## 2021-01-06 PROCEDURE — 700102 HCHG RX REV CODE 250 W/ 637 OVERRIDE(OP): Performed by: SURGERY

## 2021-01-06 PROCEDURE — 700105 HCHG RX REV CODE 258: Performed by: SURGERY

## 2021-01-06 PROCEDURE — 700111 HCHG RX REV CODE 636 W/ 250 OVERRIDE (IP): Performed by: SURGERY

## 2021-01-06 PROCEDURE — 96376 TX/PRO/DX INJ SAME DRUG ADON: CPT

## 2021-01-06 PROCEDURE — G0378 HOSPITAL OBSERVATION PER HR: HCPCS

## 2021-01-06 PROCEDURE — 700101 HCHG RX REV CODE 250: Performed by: SURGERY

## 2021-01-06 RX ADMIN — MIRTAZAPINE 30 MG: 30 TABLET, FILM COATED ORAL at 20:24

## 2021-01-06 RX ADMIN — MORPHINE SULFATE 4 MG: 4 INJECTION INTRAVENOUS at 15:35

## 2021-01-06 RX ADMIN — ACETAMINOPHEN 1000 MG: 500 TABLET ORAL at 23:16

## 2021-01-06 RX ADMIN — OXYCODONE HYDROCHLORIDE 10 MG: 10 TABLET ORAL at 09:51

## 2021-01-06 RX ADMIN — MORPHINE SULFATE 4 MG: 4 INJECTION INTRAVENOUS at 12:03

## 2021-01-06 RX ADMIN — POLYETHYLENE GLYCOL 3350 1 PACKET: 17 POWDER, FOR SOLUTION ORAL at 18:09

## 2021-01-06 RX ADMIN — DOCUSATE SODIUM 100 MG: 100 CAPSULE, LIQUID FILLED ORAL at 18:09

## 2021-01-06 RX ADMIN — ACETAMINOPHEN 1000 MG: 500 TABLET ORAL at 18:10

## 2021-01-06 RX ADMIN — POLYETHYLENE GLYCOL 3350 1 PACKET: 17 POWDER, FOR SOLUTION ORAL at 04:50

## 2021-01-06 RX ADMIN — SODIUM CHLORIDE, POTASSIUM CHLORIDE, SODIUM LACTATE AND CALCIUM CHLORIDE: 600; 310; 30; 20 INJECTION, SOLUTION INTRAVENOUS at 03:42

## 2021-01-06 RX ADMIN — MORPHINE SULFATE 4 MG: 4 INJECTION INTRAVENOUS at 20:24

## 2021-01-06 RX ADMIN — SODIUM CHLORIDE, POTASSIUM CHLORIDE, SODIUM LACTATE AND CALCIUM CHLORIDE: 600; 310; 30; 20 INJECTION, SOLUTION INTRAVENOUS at 20:44

## 2021-01-06 RX ADMIN — ACETAMINOPHEN 1000 MG: 500 TABLET ORAL at 12:03

## 2021-01-06 RX ADMIN — DOCUSATE SODIUM 50 MG AND SENNOSIDES 8.6 MG 1 TABLET: 8.6; 5 TABLET, FILM COATED ORAL at 20:24

## 2021-01-06 RX ADMIN — DOCUSATE SODIUM 100 MG: 100 CAPSULE, LIQUID FILLED ORAL at 04:49

## 2021-01-06 RX ADMIN — OXYCODONE HYDROCHLORIDE 10 MG: 10 TABLET ORAL at 03:39

## 2021-01-06 RX ADMIN — MORPHINE SULFATE 4 MG: 4 INJECTION INTRAVENOUS at 04:49

## 2021-01-06 RX ADMIN — OXYCODONE HYDROCHLORIDE 10 MG: 10 TABLET ORAL at 14:08

## 2021-01-06 RX ADMIN — VENLAFAXINE HYDROCHLORIDE 262.5 MG: 75 CAPSULE, EXTENDED RELEASE ORAL at 06:42

## 2021-01-06 RX ADMIN — OXYCODONE HYDROCHLORIDE 10 MG: 10 TABLET ORAL at 18:10

## 2021-01-06 ASSESSMENT — PAIN DESCRIPTION - PAIN TYPE
TYPE: ACUTE PAIN

## 2021-01-06 NOTE — PROGRESS NOTES
DATE: 1/6/2021    Post Operative Day 3 laparoscopic cholecystectomy, postoperative day 1 following diagnostic laparoscopy, evacuation hematoma, uncontrolled port site hemorrhage.    Interval Events:  Urinary retention.  Colunga catheter replaced.    PHYSICAL EXAMINATION:  Vital Signs: /74   Pulse 70   Temp 36.6 °C (97.9 °F) (Temporal)   Resp 16   Wt 108.7 kg (239 lb 11.2 oz)   SpO2 98%     The patient is well-appearing.  Skin is warm and well-perfused.  No diaphoresis.  Regular rate and rhythm.  The abdomen is soft minimally tender in the right subcostal region.  Right lateral Mark Anthony drain with bloody drainage.  Trocar sites are clean and dry.    Laboratory Values:   Recent Labs     01/04/21  0210 01/04/21  0645 01/04/21  0645 01/04/21  1824 01/05/21  0109 01/06/21  0043   WBC 20.5*  --   --   --  10.1 7.7   RBC 4.60*  --   --   --  2.77* 2.71*   HEMOGLOBIN 14.2 12.4*   < > 10.4* 8.9* 8.5*   HEMATOCRIT 41.3* 35.3*  --   --  25.4* 25.0*   MCV 89.8  --   --   --  91.7 92.3   MCH 30.9  --   --   --  32.1 31.4   MCHC 34.4  --   --   --  35.0 34.0   RDW 40.2  --   --   --  41.5 41.5   PLATELETCT 315  --   --   --  160* 149*   MPV 10.7  --   --   --  10.3 10.3    < > = values in this interval not displayed.       ASSESSMENT AND PLAN:     Satisfactory progress.  Remove Colunga catheter later today.  Continue Mark Anthony drainage overnight and trend CBC.  Tentative discharge tomorrow if hemoglobin remains stable.     ____________________________________     Geraldo Yepez M.D.    DD: 1/6/2021  10:06 AM

## 2021-01-06 NOTE — PROGRESS NOTES
Assessment complete.  A&O x 4. Patient calls appropriately.  Patient ambulates with x1 assist. Bed alarm off.   Patient has 5/10 pain. Pain managed with prescribed medications.  Denies N&V. Tolerating reg diet.  Surgical dressings CDI. RLQ RACHELL drain dressing changed. Small amounts of drainage at insertion site, oozes out upon movement.  + void into garcia, - flatus, - BM.  Patient denies SOB.  SCD's off.    Review plan with of care with patient. Call light and personal belongings with in reach. Hourly rounding in place. All needs met at this time.

## 2021-01-06 NOTE — DIETARY
Nutrition services: Day 0 of admit.  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 male with admitting DX of hemoperitoneum    Consult received for MST 3 (reported poor appetite and 14-23 lb weight loss within past 3 months)    RD able to visit pt at bedside. Pt states has had a poor appetite since Sunday. Mentions is experiencing early satiety after just a few bites, states did not eat much breakfast this AM as a result. Pt reports a 20 lb weight loss within the past 6 months, states UBW of 250 lbs. Relays weight loss attributed to medication for behavioral health and this caused some differences in appetite and some fatigue. States has noticed clothes fit loosely. Pt expresses interest in shakes, states would like a chocolate Boost shake as able.     Assessment:  Weight: 108.7 kg (239 lb 11.2 oz)  Body mass index is 31.62 kg/m²., BMI classification: Obesity Class I  Diet/Intake: Regular diet     Evaluation:   1. Hx of obesity , COD, generalized anxiety disorder, cholecystitis, moderate episode of recurrent major depressive disorder.   2. Pt reports early satiety impacting intake starting Sunday, indicating potential 3 days of inadequate intake. RD to add Boost shake once per day and adjust pending appetite improvements. Two meals charted thus far 50-75% at this time.   3. Pt reported UBW of 250 lbs; per chart review, pt weighed 244 lbs on 10/2/19, indicating potential 5 lb weight loss within past 3 months. This is an insignificant 2% loss.   4. Per nutrition-focused physical assessment, pt appears appropriately nourished per visual observation.   5. Labs: glucose 106, albumin 3.1   6. Meds: colace, milk of magnesia, remeron, miralax, senokot, effexor, LR infusion  7. Last BM: PTA    Malnutrition Risk: Pt does not meet criteria per ASPEN/AND guidelines for malnutrition. RD to reevaluate as indicated.     Recommendations/Plan:  1. RD to add Boost Plus once daily-MD approved.   2. Encourage intake of meals  3. Document  intake of all meals  as % taken in ADL's to provide interdisciplinary communication across all shifts.   4. Monitor weight.  5. Nutrition rep will continue to see patient for ongoing meal and snack preferences.     RD following

## 2021-01-06 NOTE — CARE PLAN
Problem: Nutritional:  Goal: Achieve adequate nutritional intake  Description: Patient will consume >50% of meals  Outcome: PROGRESSING AS EXPECTED  Note: See RD note

## 2021-01-07 VITALS
TEMPERATURE: 98 F | WEIGHT: 239.7 LBS | RESPIRATION RATE: 17 BRPM | BODY MASS INDEX: 31.62 KG/M2 | SYSTOLIC BLOOD PRESSURE: 123 MMHG | OXYGEN SATURATION: 96 % | DIASTOLIC BLOOD PRESSURE: 68 MMHG | HEART RATE: 68 BPM

## 2021-01-07 LAB
BASOPHILS # BLD AUTO: 0.3 % (ref 0–1.8)
BASOPHILS # BLD: 0.02 K/UL (ref 0–0.12)
EOSINOPHIL # BLD AUTO: 0.14 K/UL (ref 0–0.51)
EOSINOPHIL NFR BLD: 2.2 % (ref 0–6.9)
ERYTHROCYTE [DISTWIDTH] IN BLOOD BY AUTOMATED COUNT: 41.3 FL (ref 35.9–50)
HCT VFR BLD AUTO: 26.3 % (ref 42–52)
HGB BLD-MCNC: 9 G/DL (ref 14–18)
IMM GRANULOCYTES # BLD AUTO: 0.06 K/UL (ref 0–0.11)
IMM GRANULOCYTES NFR BLD AUTO: 0.9 % (ref 0–0.9)
LYMPHOCYTES # BLD AUTO: 1.89 K/UL (ref 1–4.8)
LYMPHOCYTES NFR BLD: 29.1 % (ref 22–41)
MAGNESIUM SERPL-MCNC: 1.9 MG/DL (ref 1.5–2.5)
MCH RBC QN AUTO: 31.1 PG (ref 27–33)
MCHC RBC AUTO-ENTMCNC: 34.2 G/DL (ref 33.7–35.3)
MCV RBC AUTO: 91 FL (ref 81.4–97.8)
MONOCYTES # BLD AUTO: 0.45 K/UL (ref 0–0.85)
MONOCYTES NFR BLD AUTO: 6.9 % (ref 0–13.4)
NEUTROPHILS # BLD AUTO: 3.94 K/UL (ref 1.82–7.42)
NEUTROPHILS NFR BLD: 60.6 % (ref 44–72)
NRBC # BLD AUTO: 0 K/UL
NRBC BLD-RTO: 0 /100 WBC
PHOSPHATE SERPL-MCNC: 3.3 MG/DL (ref 2.5–4.5)
PLATELET # BLD AUTO: 158 K/UL (ref 164–446)
PMV BLD AUTO: 10.4 FL (ref 9–12.9)
RBC # BLD AUTO: 2.89 M/UL (ref 4.7–6.1)
WBC # BLD AUTO: 6.5 K/UL (ref 4.8–10.8)

## 2021-01-07 PROCEDURE — G0378 HOSPITAL OBSERVATION PER HR: HCPCS

## 2021-01-07 PROCEDURE — 700102 HCHG RX REV CODE 250 W/ 637 OVERRIDE(OP): Performed by: SURGERY

## 2021-01-07 PROCEDURE — 84100 ASSAY OF PHOSPHORUS: CPT

## 2021-01-07 PROCEDURE — 700101 HCHG RX REV CODE 250: Performed by: SURGERY

## 2021-01-07 PROCEDURE — 700111 HCHG RX REV CODE 636 W/ 250 OVERRIDE (IP): Performed by: SURGERY

## 2021-01-07 PROCEDURE — 96376 TX/PRO/DX INJ SAME DRUG ADON: CPT

## 2021-01-07 PROCEDURE — 85025 COMPLETE CBC W/AUTO DIFF WBC: CPT

## 2021-01-07 PROCEDURE — A9270 NON-COVERED ITEM OR SERVICE: HCPCS | Performed by: SURGERY

## 2021-01-07 PROCEDURE — 83735 ASSAY OF MAGNESIUM: CPT

## 2021-01-07 RX ADMIN — POLYETHYLENE GLYCOL 3350 1 PACKET: 17 POWDER, FOR SOLUTION ORAL at 05:30

## 2021-01-07 RX ADMIN — VENLAFAXINE HYDROCHLORIDE 262.5 MG: 75 CAPSULE, EXTENDED RELEASE ORAL at 08:22

## 2021-01-07 RX ADMIN — ACETAMINOPHEN 1000 MG: 500 TABLET ORAL at 05:30

## 2021-01-07 RX ADMIN — MAGNESIUM HYDROXIDE 30 ML: 400 SUSPENSION ORAL at 05:30

## 2021-01-07 RX ADMIN — DOCUSATE SODIUM 100 MG: 100 CAPSULE, LIQUID FILLED ORAL at 05:31

## 2021-01-07 RX ADMIN — OXYCODONE 5 MG: 5 TABLET ORAL at 05:34

## 2021-01-07 RX ADMIN — MORPHINE SULFATE 4 MG: 4 INJECTION INTRAVENOUS at 02:24

## 2021-01-07 ASSESSMENT — PAIN DESCRIPTION - PAIN TYPE
TYPE: ACUTE PAIN
TYPE: ACUTE PAIN

## 2021-01-07 NOTE — PROGRESS NOTES
D/C'd.  Discharge instructions provided to pt.  Pt states understanding.  Pt states all questions have been answered.  Copy of discharge provided to pt.  Signed copy in chart.   Pt states that all personal belongings are in possession.   Pt escorted off unit with assistance fromCNA and RN without incident.

## 2021-01-07 NOTE — DISCHARGE SUMMARY
DATE OF ADMISSION:  01/04/2021   DATE OF DISCHARGE:  01/07/2021     ADMITTING DIAGNOSIS:  Near syncopal event following laparoscopic   cholecystectomy.     DISCHARGE DIAGNOSIS:  Postoperative hemorrhage.     PROCEDURE PERFORMED:  Diagnostic laparoscopy with evacuation of   intraperitoneal hematoma and control of port site hemorrhage.     HISTORY OF PRESENT ILLNESS AND HOSPITAL COURSE:  The patient is a 30-year-old   gentleman who underwent an uneventful laparoscopic cholecystectomy several   days prior to admission.  He was discharged to home the same day.  He   presented back to the Emergency Department following an outpatient episode of   dizziness with a near syncopal event.  Workup in the Emergency Department   demonstrated a modest drop in his hemoglobin and ultrasound demonstrating free   fluid within the abdomen.     The patient was admitted to the hospital for observation.  Over the next   several hours, his hemoglobin continued to have progressive decline.  He was   taken to surgery and reexplored laparoscopically.  A large amount of clot and   old blood was evacuated from the abdominal cavity.  No active bleeding was   identified; however, there appeared to be staining and a large amount of clot   beneath the umbilical port site, which was presumed to be the site of   hemorrhage.  The clot was evacuated and a hemostatic suture placed over the   umbilical port site.     The patient's postoperative convalescence was unremarkable.  His diet was   advanced.  He tolerated this well.  He was noted to be ambulating, voiding   spontaneously, and tolerating a regular diet.  He had initial large output   from his drain which abated quickly.  This was removed without incident.  He   was discharged to home today in good condition.     DISCHARGE MEDICATIONS:  New discharge medications none.  The patient will   resume all of his outpatient medications as previously prescribed.  He will   follow up with Dr. Tracy in the  office in 1 week for routine evaluation and   management.     DISCHARGE DIET:  Regular.     DISCHARGE ACTIVITY:  As tolerated without restrictions.        ______________________________  MD DANIELITO HAMMOND/DIN/[facility]    DD:  01/07/2021 07:46  DT:  01/07/2021 08:37    Job#:  [id number]    CC:MD JORGE ALBERTO CHING MD

## 2021-01-07 NOTE — DISCHARGE INSTRUCTIONS
Laparoscopic Cholecystectomy Discharge Instructions:    1. DIET: Upon discharge from the hospital you may resume your normal preoperative diet. Depending on how you are feeling and whether you have nausea or not, you may wish to stay with a bland diet for the first few days. However, you can advance this as quickly as you feel ready.    2. ACTIVITIES: After discharge from the hospital, you may resume full routine activities. Heavy lifting and strenuous activities may make your incisions sore, but are not likely to result in injury. Routine activities of daily living are acceptable.    3. DRIVING: You may drive whenever you are no longer taking narcotic pain medications and are able to perform the activities needed to drive safely, i.e. turning, bending, twisting, etc.    4. BATHING: You may get the wound wet at any time after leaving the hospital. You may shower, but do not submerge in a bath for at least 48 hours. Dressings may come off after 48 hours.    5. BOWEL FUNCTION: A few patients, after this operation, will develop either frequent or loose stools after meals. This usually corrects itself after a few days, to a few weeks. If this occurs, do not worry; it is not unusual and will likely resolve. Much more common than loose stools, is constipation. The combination of pain medication and decreased activity level can cause constipation in otherwise normal patients. If you feel this is occurring, take a laxative (Milk of Magnesia, Ex-Lax, Senokot, etc.) until the problem has resolved.    6. PAIN MEDICATION: You will be given a prescription for pain medication at discharge. Please take these as directed. It is important to remember not to take medications on an empty stomach as this may cause nausea. Avoid alcohol consumption while taking pain medication.    7. Call if you have: (1) Fevers to more than 1010 F, (2) Unusual chest or leg pain, (3) Drainage or fluid from incision that may be foul smelling, increased  tenderness or soreness at the wound or the wound edges are no longer together, redness or swelling at the incision site. (4) New or recurrent right upper quadrant pain.    If you have any additional questions, please do not hesitate to call the office and speak to either myself or the physician on call.    [address] [facility]  [address]   [place] [address]    Re Tracy MD, FACS  [facility]  [phone]      Discharge Instructions    Discharged to home by car with relative. Discharged via wheelchair, hospital escort: Yes.  Special equipment needed: Not Applicable    Be sure to schedule a follow-up appointment with your primary care doctor or any specialists as instructed.     Discharge Plan:   Influenza Vaccine Indication: Not indicated: Previously immunized this influenza season and > 8 years of age    I understand that a diet low in cholesterol, fat, and sodium is recommended for good health. Unless I have been given specific instructions below for another diet, I accept this instruction as my diet prescription.   Other diet: regular    Special Instructions: None    · Is patient discharged on Warfarin / Coumadin?   No     Depression / Suicide Risk    As you are discharged from this [facility] facility, it is important to learn how to keep safe from harming yourself.    Recognize the warning signs:  · Abrupt changes in personality, positive or negative- including increase in energy   · Giving away possessions  · Change in eating patterns- significant weight changes-  positive or negative  · Change in sleeping patterns- unable to sleep or sleeping all the time   · Unwillingness or inability to communicate  · Depression  · Unusual sadness, discouragement and loneliness  · Talk of wanting to die  · Neglect of personal appearance   · Rebelliousness- reckless behavior  · Withdrawal from people/activities they love  · Confusion- inability to concentrate     If you or a loved one observes any of these  behaviors or has concerns about self-harm, here's what you can do:  · Talk about it- your feelings and reasons for harming yourself  · Remove any means that you might use to hurt yourself (examples: pills, rope, extension cords, firearm)  · Get professional help from the community (Mental Health, Substance Abuse, psychological counseling)  · Do not be alone:Call your Safe Contact- someone whom you trust who will be there for you.  · Call your local CRISIS HOTLINE 614-2193 or 547-210-4500  · Call your local Children's Mobile Crisis Response Team [place] (242) 212-7485 or www.Graphenea  · Call the toll free National Suicide Prevention Hotlines   · National Suicide Prevention Lifeline 524-750-KEEZ (1219)  · National Hope Line Network 800-SUICIDE (541-5121)

## 2021-01-07 NOTE — DISCHARGE PLANNING
Anticipated Discharge Disposition:   Home    Action:   This RN CM is following the case. Plan is to discharge Pt to home     Barriers to Discharge:   None    Plan:   Will continue to assist Pt with discharge as needed.

## 2021-02-09 ENCOUNTER — TELEMEDICINE (OUTPATIENT)
Dept: BEHAVIORAL HEALTH | Facility: CLINIC | Age: 31
End: 2021-02-09
Payer: COMMERCIAL

## 2021-02-09 VITALS — BODY MASS INDEX: 31.81 KG/M2 | HEIGHT: 73 IN | WEIGHT: 240 LBS

## 2021-02-09 DIAGNOSIS — G47.09 OTHER INSOMNIA: ICD-10-CM

## 2021-02-09 DIAGNOSIS — F33.2 SEVERE EPISODE OF RECURRENT MAJOR DEPRESSIVE DISORDER, WITHOUT PSYCHOTIC FEATURES (HCC): ICD-10-CM

## 2021-02-09 DIAGNOSIS — F41.1 GENERALIZED ANXIETY DISORDER: ICD-10-CM

## 2021-02-09 DIAGNOSIS — F33.1 MODERATE EPISODE OF RECURRENT MAJOR DEPRESSIVE DISORDER (HCC): ICD-10-CM

## 2021-02-09 PROCEDURE — 99214 OFFICE O/P EST MOD 30 MIN: CPT | Mod: 95,CR | Performed by: PSYCHIATRY & NEUROLOGY

## 2021-02-09 PROCEDURE — 90833 PSYTX W PT W E/M 30 MIN: CPT | Mod: 95,CR | Performed by: PSYCHIATRY & NEUROLOGY

## 2021-02-09 RX ORDER — VENLAFAXINE HYDROCHLORIDE 150 MG/1
CAPSULE, EXTENDED RELEASE ORAL
Qty: 180 CAP | Refills: 0 | Status: SHIPPED | OUTPATIENT
Start: 2021-02-09 | End: 2021-05-05 | Stop reason: SDUPTHER

## 2021-02-09 RX ORDER — VENLAFAXINE 37.5 MG/1
TABLET ORAL
Qty: 90 TAB | Refills: 0 | Status: SHIPPED | OUTPATIENT
Start: 2021-02-09 | End: 2021-03-09

## 2021-02-09 RX ORDER — MIRTAZAPINE 30 MG/1
30 TABLET, FILM COATED ORAL
Qty: 90 TAB | Refills: 0 | Status: SHIPPED | OUTPATIENT
Start: 2021-02-09 | End: 2021-08-03

## 2021-02-09 ASSESSMENT — PATIENT HEALTH QUESTIONNAIRE - PHQ9
9. THOUGHTS THAT YOU WOULD BE BETTER OFF DEAD, OR OF HURTING YOURSELF: SEVERAL DAYS
5. POOR APPETITE OR OVEREATING: NEARLY EVERY DAY
4. FEELING TIRED OR HAVING LITTLE ENERGY: NEARLY EVERY DAY
3. TROUBLE FALLING OR STAYING ASLEEP OR SLEEPING TOO MUCH: NEARLY EVERY DAY
SUM OF ALL RESPONSES TO PHQ QUESTIONS 1-9: 23
7. TROUBLE CONCENTRATING ON THINGS, SUCH AS READING THE NEWSPAPER OR WATCHING TELEVISION: MORE THAN HALF THE DAYS
SUM OF ALL RESPONSES TO PHQ9 QUESTIONS 1 AND 2: 6
2. FEELING DOWN, DEPRESSED, IRRITABLE, OR HOPELESS: NEARLY EVERY DAY
6. FEELING BAD ABOUT YOURSELF - OR THAT YOU ARE A FAILURE OR HAVE LET YOURSELF OR YOUR FAMILY DOWN: NEARLY EVERY DAY
1. LITTLE INTEREST OR PLEASURE IN DOING THINGS: NEARLY EVERY DAY
8. MOVING OR SPEAKING SO SLOWLY THAT OTHER PEOPLE COULD HAVE NOTICED. OR THE OPPOSITE, BEING SO FIGETY OR RESTLESS THAT YOU HAVE BEEN MOVING AROUND A LOT MORE THAN USUAL: MORE THAN HALF THE DAYS

## 2021-02-09 ASSESSMENT — FIBROSIS 4 INDEX: FIB4 SCORE: 0.88
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
## 2021-02-12 ENCOUNTER — HOSPITAL ENCOUNTER (OUTPATIENT)
Dept: BEHAVIORAL HEALTH | Facility: MEDICAL CENTER | Age: 31
End: 2021-02-12
Attending: PSYCHIATRY & NEUROLOGY
Payer: COMMERCIAL

## 2021-02-12 NOTE — NON-PROVIDER
Patient came in for a TMS consultation. Patient does not report any contraindications for TMS. Patient has been scheduled for mapping on 2/23/2020 at 230pm. Patients PA has been submitted.

## 2021-02-23 ENCOUNTER — HOSPITAL ENCOUNTER (OUTPATIENT)
Dept: BEHAVIORAL HEALTH | Facility: MEDICAL CENTER | Age: 31
End: 2021-02-23
Attending: PSYCHIATRY & NEUROLOGY
Payer: COMMERCIAL

## 2021-02-23 DIAGNOSIS — F33.1 MODERATE EPISODE OF RECURRENT MAJOR DEPRESSIVE DISORDER (HCC): ICD-10-CM

## 2021-02-23 PROCEDURE — 90868 TCRANIAL MAGN STIM TX DELI: CPT | Performed by: PSYCHIATRY & NEUROLOGY

## 2021-02-23 PROCEDURE — 90867 TCRANIAL MAGN STIM TX PLAN: CPT | Performed by: PSYCHIATRY & NEUROLOGY

## 2021-02-23 ASSESSMENT — PATIENT HEALTH QUESTIONNAIRE - PHQ9
5. POOR APPETITE OR OVEREATING: 3 - NEARLY EVERY DAY
SUM OF ALL RESPONSES TO PHQ QUESTIONS 1-9: 20
CLINICAL INTERPRETATION OF PHQ2 SCORE: 5

## 2021-02-23 NOTE — ADDENDUM NOTE
Encounter addended by: Jimmie Akers M.D. on: 2/23/2021 3:57 PM   Actions taken: Clinical Note Signed

## 2021-02-23 NOTE — NON-PROVIDER
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  Combine with Effexor  mg for a total of 337.5 mg once a day 90 Tab 0   • mirtazapine (REMERON) 30 MG Tab tablet Take 1 Tab by mouth every bedtime. 90 Tab 0   • emtricitabine-tenofovir (TRUVADA) 200-300 MG per tablet Take 1 Tab by mouth every evening.     • Multiple Vitamins-Minerals (MENS MULTIVITAMIN) Tab Take 1 Tab by mouth every evening.     • simethicone (MYLICON) 80 MG Chew Tab Chew 240 mg one time as needed for Flatulence. 3 tablets = 240 mg.     • emtricitabine-TAF (DESCOVY) 200-25 mg Tab tablet Take 1 Tab by mouth every day. (Patient taking differently: Take 1 Tab by mouth every day. New medication NOT STARTED) 90 Tab 3     No current facility-administered medications for this encounter.         Pretreatment Questions    Any Metal in or around head? No  Taking current medications as prescribed? Yes  Change in medical history or medications since patient last saw physician? No  Change in meals or caffeine use? No, patient reports having 3 meals and a 12 oz cup of coffee on a daily basis.   Change in sleep pattern? No,   Did patient pre-treat today with any over the counter medication? No   Any problems or changes since patient's last session? No    Was PHQ9 completed today?  Yes  If yes, Score: 20/27      Treatment  Were ear plugs given and inserted by patient? Yes  Was patient positioned correctly? Yes  Did patient have any finger or hand twitching? No  Did patient have any facial discomfort? No  Did patient have any changes to SMT? No  How did patient tolerate treatment? Patient tolerated well.     Did patient retrieve any belongings they  removed before treatment? Yes    See attached Session Report scanned into chart.    [unfilled]

## 2021-02-23 NOTE — PROGRESS NOTES
NAME@ is a 30 y.o. male here today for TMS Mapping      Was BMI II completed? Yes    Was PHQ9 given today?  Yes    Treatment consent form was read, reviewed, and discussed with the patient who was given the opportunity to ask questions.  Treatment consent was signed by the patient, physician, and witness.      Mapping Questions    Were ear plugs given and inserted by patient? Yes  Was patient positioned correctly? Yes  How did patient tolerate mapping process?  Initially patient had mild headache and tearing from left eye which resolved with discontinuation of treatment.  Session began with 80% strength and was gradually increased to 110% strength and patient agreed with plan of increasing to 120% tomorrow.    See attached Session Report scanned into chart.      Assessment and Plan:     [unfilled]    TMS Treatment Plan:  Treatment will consist of 30 TMS sessions over the next six weeks.    Patient began TMS treatment today immediately following mapping.      This plan was reviewed with the patient who is in agreement.

## 2021-02-24 ENCOUNTER — HOSPITAL ENCOUNTER (OUTPATIENT)
Dept: BEHAVIORAL HEALTH | Facility: MEDICAL CENTER | Age: 31
End: 2021-02-24
Attending: PSYCHIATRY & NEUROLOGY
Payer: COMMERCIAL

## 2021-02-24 DIAGNOSIS — F33.1 MODERATE EPISODE OF RECURRENT MAJOR DEPRESSIVE DISORDER (HCC): ICD-10-CM

## 2021-02-24 PROCEDURE — 90868 TCRANIAL MAGN STIM TX DELI: CPT | Performed by: PSYCHIATRY & NEUROLOGY

## 2021-02-25 ENCOUNTER — HOSPITAL ENCOUNTER (OUTPATIENT)
Dept: BEHAVIORAL HEALTH | Facility: MEDICAL CENTER | Age: 31
End: 2021-02-25
Attending: PSYCHIATRY & NEUROLOGY
Payer: COMMERCIAL

## 2021-02-25 NOTE — NON-PROVIDER
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  Combine with Effexor  mg for a total of 337.5 mg once a day 90 Tab 0   • mirtazapine (REMERON) 30 MG Tab tablet Take 1 Tab by mouth every bedtime. 90 Tab 0   • emtricitabine-tenofovir (TRUVADA) 200-300 MG per tablet Take 1 Tab by mouth every evening.     • Multiple Vitamins-Minerals (MENS MULTIVITAMIN) Tab Take 1 Tab by mouth every evening.     • simethicone (MYLICON) 80 MG Chew Tab Chew 240 mg one time as needed for Flatulence. 3 tablets = 240 mg.     • emtricitabine-TAF (DESCOVY) 200-25 mg Tab tablet Take 1 Tab by mouth every day. (Patient taking differently: Take 1 Tab by mouth every day. New medication NOT STARTED) 90 Tab 3     No current facility-administered medications for this encounter.         Pretreatment Questions    Any Metal in or around head? No  Taking current medications as prescribed? Yes  Change in medical history or medications since patient last saw physician? No  Change in meals or caffeine use? No  Change in sleep pattern? No   Did patient pre-treat today with any over the counter medication? No   Any problems or changes since patient's last session? No    Was PHQ9 completed today?  No   If yes, Score:       Treatment  Were ear plugs given and inserted by patient? Yes  Was patient positioned correctly? Yes  Did patient have any finger or hand twitching? No  Did patient have any facial discomfort? No  Did patient have any changes to SMT? No  How did patient tolerate treatment? Patient tolerated well. Patient reports sleeping well yesterday and not having any headaches or concerns from initial visit.     Did patient retrieve any  belongings they removed before treatment? Yes    See attached Session Report scanned into chart.    [unfilled]

## 2021-02-26 ENCOUNTER — HOSPITAL ENCOUNTER (OUTPATIENT)
Dept: BEHAVIORAL HEALTH | Facility: MEDICAL CENTER | Age: 31
End: 2021-02-26
Attending: PSYCHIATRY & NEUROLOGY
Payer: COMMERCIAL

## 2021-02-26 NOTE — NON-PROVIDER
"TMS visit was not completed due to patient feeling like his temple was cramping. Patient felt states \"it feels different.\" Due to the positioning of the patient and not having the LAC I didn't feel comfortable continuing. Reached out to Dr Akers, he will be down at patients visit tomorrow. Patient did not complete treatment today.   "

## 2021-02-27 NOTE — NON-PROVIDER
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  Combine with Effexor  mg for a total of 337.5 mg once a day 90 Tab 0   • mirtazapine (REMERON) 30 MG Tab tablet Take 1 Tab by mouth every bedtime. 90 Tab 0   • emtricitabine-tenofovir (TRUVADA) 200-300 MG per tablet Take 1 Tab by mouth every evening.     • Multiple Vitamins-Minerals (MENS MULTIVITAMIN) Tab Take 1 Tab by mouth every evening.     • simethicone (MYLICON) 80 MG Chew Tab Chew 240 mg one time as needed for Flatulence. 3 tablets = 240 mg.     • emtricitabine-TAF (DESCOVY) 200-25 mg Tab tablet Take 1 Tab by mouth every day. (Patient taking differently: Take 1 Tab by mouth every day. New medication NOT STARTED) 90 Tab 3     No current facility-administered medications for this encounter.         Pretreatment Questions    Any Metal in or around head? No  Taking current medications as prescribed? Yes  Change in medical history or medications since patient last saw physician? No  Change in meals or caffeine use? No  Change in sleep pattern? No. Patient states he did not sleep well last night.   Did patient pre-treat today with any over the counter medication? No   Any problems or changes since patient's last session? No    Was PHQ9 completed today?  No   If yes, Score:       Treatment  Were ear plugs given and inserted by patient? Yes  Was patient positioned correctly? Yes  Did patient have any finger or hand twitching? No  Did patient have any facial discomfort? No  Did patient have any changes to SMT? No  How did patient tolerate treatment? Patient tolerated well. Patient was remapped due to LAC location, per [employer], use another type of  method of accuracy.Patient LAC is now located in ear on helicis augustine.     Did patient retrieve any belongings they removed before treatment? Yes    See attached Session Report scanned into chart.    [unfilled]

## 2021-02-27 NOTE — PROGRESS NOTES
NAME@ is a 30 y.o. male here today for Re-TMS Mapping    Treatment consent form was read, reviewed, and discussed with the patient who was given the opportunity to ask questions.  Treatment consent was signed by the patient, physician, and witness.      Mapping Questions    Were ear plugs given and inserted by patient? Yes  Was patient positioned correctly? Yes  How did patient tolerate mapping process?  Patient was remap today due to issues with his positioning.  A landmark was chosen on his left ear that will be used for positioning in each session.  Patient tolerated mapping well and session was began at 90% strength.  Patient reports having radiating pain to the left eye going down to left jaw.  Coil angle was moved to +5 degree and patient reported resolution of this pain and gradually the the strength was increased 110 degree with plan of increasing to 120 degree in next session.    See attached Session Report scanned into chart.      Assessment and Plan:     [unfilled]    TMS Treatment Plan:  Treatment will consist of 30 TMS sessions over the next six weeks.    Patient began TMS treatment today immediately following mapping.      This plan was reviewed with the patient who is in agreement.

## 2021-03-01 ENCOUNTER — HOSPITAL ENCOUNTER (OUTPATIENT)
Dept: BEHAVIORAL HEALTH | Facility: MEDICAL CENTER | Age: 31
End: 2021-03-01
Attending: PSYCHIATRY & NEUROLOGY
Payer: COMMERCIAL

## 2021-03-01 DIAGNOSIS — F33.1 MODERATE EPISODE OF RECURRENT MAJOR DEPRESSIVE DISORDER (HCC): ICD-10-CM

## 2021-03-01 PROCEDURE — 90868 TCRANIAL MAGN STIM TX DELI: CPT | Performed by: PSYCHIATRY & NEUROLOGY

## 2021-03-01 NOTE — ADDENDUM NOTE
Encounter addended by: Corrine Mcdonough M.D. on: 3/1/2021 3:15 PM   Actions taken: Clinical Note Signed

## 2021-03-01 NOTE — PROGRESS NOTES
Marybel Kim MA is following my treatment plan in today's treatment. I reviewed her notes and agree with the today's treatment planning.

## 2021-03-02 ENCOUNTER — HOSPITAL ENCOUNTER (OUTPATIENT)
Dept: BEHAVIORAL HEALTH | Facility: MEDICAL CENTER | Age: 31
End: 2021-03-02
Attending: PSYCHIATRY & NEUROLOGY
Payer: COMMERCIAL

## 2021-03-02 DIAGNOSIS — F33.1 MODERATE EPISODE OF RECURRENT MAJOR DEPRESSIVE DISORDER (HCC): ICD-10-CM

## 2021-03-02 PROCEDURE — 90868 TCRANIAL MAGN STIM TX DELI: CPT | Performed by: PSYCHIATRY & NEUROLOGY

## 2021-03-02 NOTE — NON-PROVIDER
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  Combine with Effexor  mg for a total of 337.5 mg once a day 90 Tab 0   • mirtazapine (REMERON) 30 MG Tab tablet Take 1 Tab by mouth every bedtime. 90 Tab 0   • emtricitabine-tenofovir (TRUVADA) 200-300 MG per tablet Take 1 Tab by mouth every evening.     • Multiple Vitamins-Minerals (MENS MULTIVITAMIN) Tab Take 1 Tab by mouth every evening.     • simethicone (MYLICON) 80 MG Chew Tab Chew 240 mg one time as needed for Flatulence. 3 tablets = 240 mg.     • emtricitabine-TAF (DESCOVY) 200-25 mg Tab tablet Take 1 Tab by mouth every day. (Patient taking differently: Take 1 Tab by mouth every day. New medication NOT STARTED) 90 Tab 3     No current facility-administered medications for this encounter.         Pretreatment Questions    Any Metal in or around head? No  Taking current medications as prescribed? Yes  Change in medical history or medications since patient last saw physician? No  Change in meals or caffeine use? No  Change in sleep pattern? No   Did patient pre-treat today with any over the counter medication? No   Any problems or changes since patient's last session? No    Was PHQ9 completed today?  No   If yes, Score:       Treatment  Were ear plugs given and inserted by patient? Yes  Was patient positioned correctly? Yes  Did patient have any finger or hand twitching? No  Did patient have any facial discomfort? No  Did patient have any changes to SMT? No  How did patient tolerate treatment? Patient tolerated well. Patient reports twitching of eye felt more then last visit. Per Dr Akers, I rotated angle to 10 degrees from 5.     Did patient  retrieve any belongings they removed before treatment? Yes    See attached Session Report scanned into chart.    [unfilled]

## 2021-03-02 NOTE — ADDENDUM NOTE
Encounter addended by: Marybel Kim, Med Ass't on: 3/1/2021 4:39 PM   Actions taken: Chief Complaint modified, Visit diagnoses modified, Charge Capture section accepted, Clinical Note Signed

## 2021-03-03 ENCOUNTER — HOSPITAL ENCOUNTER (OUTPATIENT)
Dept: BEHAVIORAL HEALTH | Facility: MEDICAL CENTER | Age: 31
End: 2021-03-03
Attending: PSYCHIATRY & NEUROLOGY
Payer: COMMERCIAL

## 2021-03-03 DIAGNOSIS — F33.1 MODERATE EPISODE OF RECURRENT MAJOR DEPRESSIVE DISORDER (HCC): ICD-10-CM

## 2021-03-03 PROCEDURE — 90868 TCRANIAL MAGN STIM TX DELI: CPT | Performed by: PSYCHIATRY & NEUROLOGY

## 2021-03-03 ASSESSMENT — PATIENT HEALTH QUESTIONNAIRE - PHQ9
5. POOR APPETITE OR OVEREATING: 2 - MORE THAN HALF THE DAYS
SUM OF ALL RESPONSES TO PHQ QUESTIONS 1-9: 13
CLINICAL INTERPRETATION OF PHQ2 SCORE: 2

## 2021-03-03 NOTE — PROGRESS NOTES
Marybel Kim is following my treatment plan in today's treatment. I reviewed her notes and agree with the today's treatment planning.

## 2021-03-03 NOTE — NON-PROVIDER
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  Combine with Effexor  mg for a total of 337.5 mg once a day 90 Tab 0   • mirtazapine (REMERON) 30 MG Tab tablet Take 1 Tab by mouth every bedtime. 90 Tab 0   • emtricitabine-tenofovir (TRUVADA) 200-300 MG per tablet Take 1 Tab by mouth every evening.     • Multiple Vitamins-Minerals (MENS MULTIVITAMIN) Tab Take 1 Tab by mouth every evening.     • simethicone (MYLICON) 80 MG Chew Tab Chew 240 mg one time as needed for Flatulence. 3 tablets = 240 mg.     • emtricitabine-TAF (DESCOVY) 200-25 mg Tab tablet Take 1 Tab by mouth every day. (Patient taking differently: Take 1 Tab by mouth every day. New medication NOT STARTED) 90 Tab 3     No current facility-administered medications for this encounter.         Pretreatment Questions    Any Metal in or around head? No  Taking current medications as prescribed? Yes  Change in medical history or medications since patient last saw physician? No  Change in meals or caffeine use? No  Change in sleep pattern? No   Did patient pre-treat today with any over the counter medication? No   Any problems or changes since patient's last session? No    Was PHQ9 completed today?  No   If yes, Score:       Treatment  Were ear plugs given and inserted by patient? Yes  Was patient positioned correctly? Yes  Did patient have any finger or hand twitching? No  Did patient have any facial discomfort? No  Did patient have any changes to SMT? No  How did patient tolerate treatment? Patient tolerated well. Patient states head felt tender after treatment. Patient reports feeling a bit more \".\"       Did patient retrieve any " belongings they removed before treatment? Yes    See attached Session Report scanned into chart.    [unfilled]

## 2021-03-04 ENCOUNTER — HOSPITAL ENCOUNTER (OUTPATIENT)
Dept: BEHAVIORAL HEALTH | Facility: MEDICAL CENTER | Age: 31
End: 2021-03-04
Attending: PSYCHIATRY & NEUROLOGY
Payer: COMMERCIAL

## 2021-03-04 DIAGNOSIS — F33.1 MODERATE EPISODE OF RECURRENT MAJOR DEPRESSIVE DISORDER (HCC): ICD-10-CM

## 2021-03-04 PROCEDURE — 90868 TCRANIAL MAGN STIM TX DELI: CPT | Performed by: PSYCHIATRY & NEUROLOGY

## 2021-03-04 NOTE — NON-PROVIDER
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  Combine with Effexor  mg for a total of 337.5 mg once a day 90 Tab 0   • mirtazapine (REMERON) 30 MG Tab tablet Take 1 Tab by mouth every bedtime. 90 Tab 0   • emtricitabine-tenofovir (TRUVADA) 200-300 MG per tablet Take 1 Tab by mouth every evening.     • Multiple Vitamins-Minerals (MENS MULTIVITAMIN) Tab Take 1 Tab by mouth every evening.     • simethicone (MYLICON) 80 MG Chew Tab Chew 240 mg one time as needed for Flatulence. 3 tablets = 240 mg.     • emtricitabine-TAF (DESCOVY) 200-25 mg Tab tablet Take 1 Tab by mouth every day. (Patient taking differently: Take 1 Tab by mouth every day. New medication NOT STARTED) 90 Tab 3     No current facility-administered medications for this encounter.         Pretreatment Questions    Any Metal in or around head? No  Taking current medications as prescribed? Yes  Change in medical history or medications since patient last saw physician? No  Change in meals or caffeine use? No  Change in sleep pattern? No   Did patient pre-treat today with any over the counter medication? Patient reports taking 2 Excedrin pills before treatment due to headache from glasses.   Any problems or changes since patient's last session? No    Was PHQ9 completed today?  Yes  If yes, Score: 13/27      Treatment  Were ear plugs given and inserted by patient? Yes  Was patient positioned correctly? Yes  Did patient have any finger or hand twitching? No  Did patient have any facial discomfort? No  Did patient have any changes to SMT? No  How did patient tolerate treatment? Patient tolerated well.       Did patient retrieve any  belongings they removed before treatment? Yes    See attached Session Report scanned into chart.    [unfilled]

## 2021-03-05 ENCOUNTER — HOSPITAL ENCOUNTER (OUTPATIENT)
Dept: BEHAVIORAL HEALTH | Facility: MEDICAL CENTER | Age: 31
End: 2021-03-05
Attending: PSYCHIATRY & NEUROLOGY
Payer: COMMERCIAL

## 2021-03-05 DIAGNOSIS — F33.1 MODERATE EPISODE OF RECURRENT MAJOR DEPRESSIVE DISORDER (HCC): ICD-10-CM

## 2021-03-05 PROCEDURE — 90868 TCRANIAL MAGN STIM TX DELI: CPT | Performed by: PSYCHIATRY & NEUROLOGY

## 2021-03-05 NOTE — NON-PROVIDER
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  Combine with Effexor  mg for a total of 337.5 mg once a day 90 Tab 0   • mirtazapine (REMERON) 30 MG Tab tablet Take 1 Tab by mouth every bedtime. 90 Tab 0   • emtricitabine-tenofovir (TRUVADA) 200-300 MG per tablet Take 1 Tab by mouth every evening.     • Multiple Vitamins-Minerals (MENS MULTIVITAMIN) Tab Take 1 Tab by mouth every evening.     • simethicone (MYLICON) 80 MG Chew Tab Chew 240 mg one time as needed for Flatulence. 3 tablets = 240 mg.     • emtricitabine-TAF (DESCOVY) 200-25 mg Tab tablet Take 1 Tab by mouth every day. (Patient taking differently: Take 1 Tab by mouth every day. New medication NOT STARTED) 90 Tab 3     No current facility-administered medications for this encounter.         Pretreatment Questions    Any Metal in or around head? No  Taking current medications as prescribed? Yes  Change in medical history or medications since patient last saw physician? No  Change in meals or caffeine use? No  Change in sleep pattern? No   Did patient pre-treat today with any over the counter medication? No   Any problems or changes since patient's last session? No    Was PHQ9 completed today?  No   If yes, Score:       Treatment  Were ear plugs given and inserted by patient? Yes  Was patient positioned correctly? Yes  Did patient have any finger or hand twitching? No  Did patient have any facial discomfort? No  Did patient have any changes to SMT? No  How did patient tolerate treatment? Patient tolerated well.       Did patient retrieve any belongings they removed before treatment? Yes    See attached Session Report scanned into  chart.    [unfilled]

## 2021-03-06 NOTE — NON-PROVIDER
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  Combine with Effexor  mg for a total of 337.5 mg once a day 90 Tab 0   • mirtazapine (REMERON) 30 MG Tab tablet Take 1 Tab by mouth every bedtime. 90 Tab 0   • emtricitabine-tenofovir (TRUVADA) 200-300 MG per tablet Take 1 Tab by mouth every evening.     • Multiple Vitamins-Minerals (MENS MULTIVITAMIN) Tab Take 1 Tab by mouth every evening.     • simethicone (MYLICON) 80 MG Chew Tab Chew 240 mg one time as needed for Flatulence. 3 tablets = 240 mg.     • emtricitabine-TAF (DESCOVY) 200-25 mg Tab tablet Take 1 Tab by mouth every day. (Patient taking differently: Take 1 Tab by mouth every day. New medication NOT STARTED) 90 Tab 3     No current facility-administered medications for this encounter.         Pretreatment Questions    Any Metal in or around head? No  Taking current medications as prescribed? Yes  Change in medical history or medications since patient last saw physician? No  Change in meals or caffeine use? No  Change in sleep pattern? No   Did patient pre-treat today with any over the counter medication? Yes, patient reports taking 200mg ibuprofen due to neck discomfort from sleeping wrong.   Any problems or changes since patient's last session? No    Was PHQ9 completed today?  No   If yes, Score:       Treatment  Were ear plugs given and inserted by patient? Yes  Was patient positioned correctly? Yes  Did patient have any finger or hand twitching? No  Did patient have any facial discomfort? No  Did patient have any changes to SMT? No  How did patient tolerate treatment? Patient tolerated well.     Did patient retrieve any belongings they  removed before treatment? Yes    See attached Session Report scanned into chart.    [unfilled]

## 2021-03-08 ENCOUNTER — HOSPITAL ENCOUNTER (OUTPATIENT)
Dept: BEHAVIORAL HEALTH | Facility: MEDICAL CENTER | Age: 31
End: 2021-03-08
Attending: PSYCHIATRY & NEUROLOGY
Payer: COMMERCIAL

## 2021-03-08 DIAGNOSIS — F33.1 MODERATE EPISODE OF RECURRENT MAJOR DEPRESSIVE DISORDER (HCC): ICD-10-CM

## 2021-03-08 PROCEDURE — 90868 TCRANIAL MAGN STIM TX DELI: CPT | Performed by: PSYCHIATRY & NEUROLOGY

## 2021-03-09 ENCOUNTER — TELEMEDICINE (OUTPATIENT)
Dept: BEHAVIORAL HEALTH | Facility: CLINIC | Age: 31
End: 2021-03-09
Payer: COMMERCIAL

## 2021-03-09 ENCOUNTER — HOSPITAL ENCOUNTER (OUTPATIENT)
Dept: BEHAVIORAL HEALTH | Facility: MEDICAL CENTER | Age: 31
End: 2021-03-09
Attending: PSYCHIATRY & NEUROLOGY
Payer: COMMERCIAL

## 2021-03-09 DIAGNOSIS — F41.1 GENERALIZED ANXIETY DISORDER: ICD-10-CM

## 2021-03-09 DIAGNOSIS — F33.2 SEVERE EPISODE OF RECURRENT MAJOR DEPRESSIVE DISORDER, WITHOUT PSYCHOTIC FEATURES (HCC): ICD-10-CM

## 2021-03-09 PROCEDURE — 90868 TCRANIAL MAGN STIM TX DELI: CPT | Performed by: PSYCHIATRY & NEUROLOGY

## 2021-03-09 PROCEDURE — 99214 OFFICE O/P EST MOD 30 MIN: CPT | Mod: 95,CR | Performed by: PSYCHIATRY & NEUROLOGY

## 2021-03-09 PROCEDURE — 90833 PSYTX W PT W E/M 30 MIN: CPT | Mod: 95,CR | Performed by: PSYCHIATRY & NEUROLOGY

## 2021-03-09 RX ORDER — PROPRANOLOL HYDROCHLORIDE 10 MG/1
TABLET ORAL
Qty: 60 TABLET | Refills: 0 | Status: SHIPPED | OUTPATIENT
Start: 2021-03-09

## 2021-03-09 RX ORDER — VENLAFAXINE HYDROCHLORIDE 37.5 MG/1
37.5 CAPSULE, EXTENDED RELEASE ORAL DAILY
Qty: 90 CAPSULE | Refills: 0 | Status: SHIPPED | OUTPATIENT
Start: 2021-03-09 | End: 2021-05-05

## 2021-03-09 NOTE — NON-PROVIDER
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  Combine with Effexor  mg for a total of 337.5 mg once a day 90 Tab 0   • mirtazapine (REMERON) 30 MG Tab tablet Take 1 Tab by mouth every bedtime. 90 Tab 0   • emtricitabine-tenofovir (TRUVADA) 200-300 MG per tablet Take 1 Tab by mouth every evening.     • Multiple Vitamins-Minerals (MENS MULTIVITAMIN) Tab Take 1 Tab by mouth every evening.     • simethicone (MYLICON) 80 MG Chew Tab Chew 240 mg one time as needed for Flatulence. 3 tablets = 240 mg.     • emtricitabine-TAF (DESCOVY) 200-25 mg Tab tablet Take 1 Tab by mouth every day. (Patient taking differently: Take 1 Tab by mouth every day. New medication NOT STARTED) 90 Tab 3     No current facility-administered medications for this encounter.         Pretreatment Questions    Any Metal in or around head? No  Taking current medications as prescribed? Yes  Change in medical history or medications since patient last saw physician? No  Change in meals or caffeine use? No  Change in sleep pattern? No   Did patient pre-treat today with any over the counter medication? No   Any problems or changes since patient's last session? No    Was PHQ9 completed today?  No   If yes, Score:       Treatment  Were ear plugs given and inserted by patient? Yes  Was patient positioned correctly? Yes  Did patient have any finger or hand twitching? No  Did patient have any facial discomfort? No  Did patient have any changes to SMT? No  How did patient tolerate treatment? Patient tolerated well. Patient felt the cramping sensation again, readjusted the coil and patient states \"it feels normal.\"     Did patient retrieve any " belongings they removed before treatment? Yes    See attached Session Report scanned into chart.    [unfilled]
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
## 2021-03-10 ENCOUNTER — HOSPITAL ENCOUNTER (OUTPATIENT)
Dept: BEHAVIORAL HEALTH | Facility: MEDICAL CENTER | Age: 31
End: 2021-03-10
Attending: PSYCHIATRY & NEUROLOGY
Payer: COMMERCIAL

## 2021-03-10 DIAGNOSIS — F33.2 SEVERE EPISODE OF RECURRENT MAJOR DEPRESSIVE DISORDER, WITHOUT PSYCHOTIC FEATURES (HCC): ICD-10-CM

## 2021-03-10 PROCEDURE — 90868 TCRANIAL MAGN STIM TX DELI: CPT | Performed by: PSYCHIATRY & NEUROLOGY

## 2021-03-10 NOTE — NON-PROVIDER
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 Combine with 300 mg of Effexor XR for a total of 337.5 mg 90 capsule 0   • propranolol (INDERAL) 10 MG Tab Take 1 to 2 tablets by mouth once a day for performance anxiety, take 1 hour prior to performance 60 tablet 0   • venlafaxine (EFFEXOR-XR) 150 MG extended-release capsule Take 2 tablets by mouth once a day 180 Cap 0   • mirtazapine (REMERON) 30 MG Tab tablet Take 1 Tab by mouth every bedtime. 90 Tab 0   • emtricitabine-tenofovir (TRUVADA) 200-300 MG per tablet Take 1 Tab by mouth every evening.     • Multiple Vitamins-Minerals (MENS MULTIVITAMIN) Tab Take 1 Tab by mouth every evening.     • simethicone (MYLICON) 80 MG Chew Tab Chew 240 mg one time as needed for Flatulence. 3 tablets = 240 mg.     • emtricitabine-TAF (DESCOVY) 200-25 mg Tab tablet Take 1 Tab by mouth every day. (Patient taking differently: Take 1 Tab by mouth every day. New medication NOT STARTED) 90 Tab 3     No current facility-administered medications for this encounter.         Pretreatment Questions    Any Metal in or around head? No  Taking current medications as prescribed? Yes  Change in medical history or medications since patient last saw physician? No  Change in meals or caffeine use? No  Change in sleep pattern? No   Did patient pre-treat today with any over the counter medication? No   Any problems or changes since patient's last session? No    Was PHQ9 completed today?  No   If yes, Score:       Treatment  Were ear plugs given and inserted by patient? Yes  Was patient positioned correctly? Yes  Did patient have any finger or hand twitching? No  Did patient have any facial discomfort? No  Did patient have any changes to SMT? No  How did patient tolerate  treatment? Patient tolerated well.       Did patient retrieve any belongings they removed before treatment? Yes    See attached Session Report scanned into chart.    [unfilled]

## 2021-03-11 ENCOUNTER — HOSPITAL ENCOUNTER (OUTPATIENT)
Dept: BEHAVIORAL HEALTH | Facility: MEDICAL CENTER | Age: 31
End: 2021-03-11
Attending: PSYCHIATRY & NEUROLOGY
Payer: COMMERCIAL

## 2021-03-11 DIAGNOSIS — F33.2 SEVERE EPISODE OF RECURRENT MAJOR DEPRESSIVE DISORDER, WITHOUT PSYCHOTIC FEATURES (HCC): ICD-10-CM

## 2021-03-11 PROCEDURE — 90868 TCRANIAL MAGN STIM TX DELI: CPT | Performed by: PSYCHIATRY & NEUROLOGY

## 2021-03-12 ENCOUNTER — HOSPITAL ENCOUNTER (OUTPATIENT)
Dept: BEHAVIORAL HEALTH | Facility: MEDICAL CENTER | Age: 31
End: 2021-03-12
Attending: PSYCHIATRY & NEUROLOGY
Payer: COMMERCIAL

## 2021-03-12 DIAGNOSIS — F33.2 SEVERE EPISODE OF RECURRENT MAJOR DEPRESSIVE DISORDER, WITHOUT PSYCHOTIC FEATURES (HCC): ICD-10-CM

## 2021-03-12 DIAGNOSIS — F41.1 GENERALIZED ANXIETY DISORDER: ICD-10-CM

## 2021-03-12 PROCEDURE — 90868 TCRANIAL MAGN STIM TX DELI: CPT | Performed by: PSYCHIATRY & NEUROLOGY

## 2021-03-13 NOTE — ADDENDUM NOTE
Encounter addended by: Jimmie Akers M.D. on: 3/12/2021 4:43 PM   Actions taken: Clinical Note Signed

## 2021-03-15 ENCOUNTER — APPOINTMENT (OUTPATIENT)
Dept: BEHAVIORAL HEALTH | Facility: MEDICAL CENTER | Age: 31
End: 2021-03-15
Attending: PSYCHIATRY & NEUROLOGY
Payer: COMMERCIAL

## 2021-03-16 ENCOUNTER — HOSPITAL ENCOUNTER (OUTPATIENT)
Dept: BEHAVIORAL HEALTH | Facility: MEDICAL CENTER | Age: 31
End: 2021-03-16
Attending: PSYCHIATRY & NEUROLOGY
Payer: COMMERCIAL

## 2021-03-16 DIAGNOSIS — F33.2 SEVERE EPISODE OF RECURRENT MAJOR DEPRESSIVE DISORDER, WITHOUT PSYCHOTIC FEATURES (HCC): ICD-10-CM

## 2021-03-16 PROCEDURE — 90868 TCRANIAL MAGN STIM TX DELI: CPT | Performed by: PSYCHIATRY & NEUROLOGY

## 2021-03-16 NOTE — NON-PROVIDER
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 Combine with 300 mg of Effexor XR for a total of 337.5 mg 90 capsule 0   • propranolol (INDERAL) 10 MG Tab Take 1 to 2 tablets by mouth once a day for performance anxiety, take 1 hour prior to performance 60 tablet 0   • venlafaxine (EFFEXOR-XR) 150 MG extended-release capsule Take 2 tablets by mouth once a day 180 Cap 0   • mirtazapine (REMERON) 30 MG Tab tablet Take 1 Tab by mouth every bedtime. 90 Tab 0   • emtricitabine-tenofovir (TRUVADA) 200-300 MG per tablet Take 1 Tab by mouth every evening.     • Multiple Vitamins-Minerals (MENS MULTIVITAMIN) Tab Take 1 Tab by mouth every evening.     • simethicone (MYLICON) 80 MG Chew Tab Chew 240 mg one time as needed for Flatulence. 3 tablets = 240 mg.     • emtricitabine-TAF (DESCOVY) 200-25 mg Tab tablet Take 1 Tab by mouth every day. (Patient taking differently: Take 1 Tab by mouth every day. New medication NOT STARTED) 90 Tab 3     No current facility-administered medications for this encounter.         Pretreatment Questions    Any Metal in or around head? No  Taking current medications as prescribed? Yes  Change in medical history or medications since patient last saw physician? No  Change in meals or caffeine use? No  Change in sleep pattern? No   Did patient pre-treat today with any over the counter medication? No   Any problems or changes since patient's last session? No    Was PHQ9 completed today?  No   If yes, Score:       Treatment  Were ear plugs given and inserted by patient? Yes  Was patient positioned correctly? Yes  Did patient have any finger or hand twitching? No  Did patient have any facial discomfort? No  Did patient have any changes to SMT? No  How did patient tolerate  treatment?       Did patient retrieve any belongings they removed before treatment? Yes    See attached Session Report scanned into chart.    [unfilled]

## 2021-03-16 NOTE — NON-PROVIDER
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 Combine with 300 mg of Effexor XR for a total of 337.5 mg 90 capsule 0   • propranolol (INDERAL) 10 MG Tab Take 1 to 2 tablets by mouth once a day for performance anxiety, take 1 hour prior to performance 60 tablet 0   • venlafaxine (EFFEXOR-XR) 150 MG extended-release capsule Take 2 tablets by mouth once a day 180 Cap 0   • mirtazapine (REMERON) 30 MG Tab tablet Take 1 Tab by mouth every bedtime. 90 Tab 0   • emtricitabine-tenofovir (TRUVADA) 200-300 MG per tablet Take 1 Tab by mouth every evening.     • Multiple Vitamins-Minerals (MENS MULTIVITAMIN) Tab Take 1 Tab by mouth every evening.     • simethicone (MYLICON) 80 MG Chew Tab Chew 240 mg one time as needed for Flatulence. 3 tablets = 240 mg.     • emtricitabine-TAF (DESCOVY) 200-25 mg Tab tablet Take 1 Tab by mouth every day. (Patient taking differently: Take 1 Tab by mouth every day. New medication NOT STARTED) 90 Tab 3     No current facility-administered medications for this encounter.         Pretreatment Questions    Any Metal in or around head? No  Taking current medications as prescribed? Yes  Change in medical history or medications since patient last saw physician? No  Change in meals or caffeine use? No  Change in sleep pattern? No   Did patient pre-treat today with any over the counter medication? No   Any problems or changes since patient's last session? No    Was PHQ9 completed today?  No   If yes, Score:       Treatment  Were ear plugs given and inserted by patient? Yes  Was patient positioned correctly? Yes  Did patient have any finger or hand twitching? No  Did patient have any facial discomfort? No  Did patient have any changes to SMT? No  How did patient tolerate  treatment? Patient tolerated well. Had to readjust twice as patient felt as though it was an unfamiliar feeling, no pain or discomfort.       Did patient retrieve any belongings they removed before treatment? Yes    See attached Session Report scanned into chart.    [unfilled]

## 2021-03-16 NOTE — ADDENDUM NOTE
Encounter addended by: Corrine Mcdonough M.D. on: 3/16/2021 12:51 PM   Actions taken: Clinical Note Signed

## 2021-03-17 ENCOUNTER — HOSPITAL ENCOUNTER (OUTPATIENT)
Dept: BEHAVIORAL HEALTH | Facility: MEDICAL CENTER | Age: 31
End: 2021-03-17
Attending: PSYCHIATRY & NEUROLOGY
Payer: COMMERCIAL

## 2021-03-17 DIAGNOSIS — F33.2 SEVERE EPISODE OF RECURRENT MAJOR DEPRESSIVE DISORDER, WITHOUT PSYCHOTIC FEATURES (HCC): ICD-10-CM

## 2021-03-17 PROCEDURE — 90868 TCRANIAL MAGN STIM TX DELI: CPT | Performed by: PSYCHIATRY & NEUROLOGY

## 2021-03-17 ASSESSMENT — PATIENT HEALTH QUESTIONNAIRE - PHQ9
SUM OF ALL RESPONSES TO PHQ QUESTIONS 1-9: 9
5. POOR APPETITE OR OVEREATING: 1 - SEVERAL DAYS
CLINICAL INTERPRETATION OF PHQ2 SCORE: 2

## 2021-03-17 NOTE — NON-PROVIDER
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 Combine with 300 mg of Effexor XR for a total of 337.5 mg 90 capsule 0   • propranolol (INDERAL) 10 MG Tab Take 1 to 2 tablets by mouth once a day for performance anxiety, take 1 hour prior to performance 60 tablet 0   • venlafaxine (EFFEXOR-XR) 150 MG extended-release capsule Take 2 tablets by mouth once a day 180 Cap 0   • mirtazapine (REMERON) 30 MG Tab tablet Take 1 Tab by mouth every bedtime. 90 Tab 0   • emtricitabine-tenofovir (TRUVADA) 200-300 MG per tablet Take 1 Tab by mouth every evening.     • Multiple Vitamins-Minerals (MENS MULTIVITAMIN) Tab Take 1 Tab by mouth every evening.     • simethicone (MYLICON) 80 MG Chew Tab Chew 240 mg one time as needed for Flatulence. 3 tablets = 240 mg.     • emtricitabine-TAF (DESCOVY) 200-25 mg Tab tablet Take 1 Tab by mouth every day. (Patient taking differently: Take 1 Tab by mouth every day. New medication NOT STARTED) 90 Tab 3     No current facility-administered medications for this encounter.         Pretreatment Questions    Any Metal in or around head? No  Taking current medications as prescribed? Yes  Change in medical history or medications since patient last saw physician? No  Change in meals or caffeine use? No  Change in sleep pattern? No   Did patient pre-treat today with any over the counter medication? No   Any problems or changes since patient's last session? No    Was PHQ9 completed today?  Yes  If yes, Score: 9/27      Treatment  Were ear plugs given and inserted by patient? Yes  Was patient positioned correctly? Yes  Did patient have any finger or hand twitching? No  Did patient have any facial discomfort? No  Did patient have any changes to SMT? No  How did patient tolerate  treatment? Patient tolerated well.       Did patient retrieve any belongings they removed before treatment? Yes    See attached Session Report scanned into chart.    [unfilled]

## 2021-03-17 NOTE — ADDENDUM NOTE
Encounter addended by: Jimmie Akers M.D. on: 3/17/2021 7:52 AM   Actions taken: Clinical Note Signed

## 2021-03-18 ENCOUNTER — HOSPITAL ENCOUNTER (OUTPATIENT)
Dept: BEHAVIORAL HEALTH | Facility: MEDICAL CENTER | Age: 31
End: 2021-03-18
Attending: PSYCHIATRY & NEUROLOGY
Payer: COMMERCIAL

## 2021-03-18 DIAGNOSIS — F33.2 SEVERE EPISODE OF RECURRENT MAJOR DEPRESSIVE DISORDER, WITHOUT PSYCHOTIC FEATURES (HCC): ICD-10-CM

## 2021-03-18 PROCEDURE — 90868 TCRANIAL MAGN STIM TX DELI: CPT | Performed by: PSYCHIATRY & NEUROLOGY

## 2021-03-18 NOTE — ADDENDUM NOTE
Encounter addended by: Corrine Mcdonough M.D. on: 3/18/2021 2:29 PM   Actions taken: Clinical Note Signed

## 2021-03-19 ENCOUNTER — HOSPITAL ENCOUNTER (OUTPATIENT)
Dept: BEHAVIORAL HEALTH | Facility: MEDICAL CENTER | Age: 31
End: 2021-03-19
Attending: PSYCHIATRY & NEUROLOGY
Payer: COMMERCIAL

## 2021-03-19 DIAGNOSIS — F33.2 SEVERE EPISODE OF RECURRENT MAJOR DEPRESSIVE DISORDER, WITHOUT PSYCHOTIC FEATURES (HCC): ICD-10-CM

## 2021-03-19 PROCEDURE — 90868 TCRANIAL MAGN STIM TX DELI: CPT | Performed by: PSYCHIATRY & NEUROLOGY

## 2021-03-19 NOTE — ADDENDUM NOTE
Encounter addended by: Marybel Kim, Med Ass't on: 3/19/2021 4:30 PM   Actions taken: Clinical Note Signed, Visit diagnoses modified, Charge Capture section accepted, Chief Complaint modified

## 2021-03-22 ENCOUNTER — HOSPITAL ENCOUNTER (OUTPATIENT)
Dept: BEHAVIORAL HEALTH | Facility: MEDICAL CENTER | Age: 31
End: 2021-03-22
Attending: PSYCHIATRY & NEUROLOGY
Payer: COMMERCIAL

## 2021-03-22 DIAGNOSIS — F33.2 SEVERE EPISODE OF RECURRENT MAJOR DEPRESSIVE DISORDER, WITHOUT PSYCHOTIC FEATURES (HCC): ICD-10-CM

## 2021-03-22 PROCEDURE — 90868 TCRANIAL MAGN STIM TX DELI: CPT | Performed by: PSYCHIATRY & NEUROLOGY

## 2021-03-22 NOTE — NON-PROVIDER
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 Combine with 300 mg of Effexor XR for a total of 337.5 mg 90 capsule 0   • propranolol (INDERAL) 10 MG Tab Take 1 to 2 tablets by mouth once a day for performance anxiety, take 1 hour prior to performance 60 tablet 0   • venlafaxine (EFFEXOR-XR) 150 MG extended-release capsule Take 2 tablets by mouth once a day 180 Cap 0   • mirtazapine (REMERON) 30 MG Tab tablet Take 1 Tab by mouth every bedtime. 90 Tab 0   • emtricitabine-tenofovir (TRUVADA) 200-300 MG per tablet Take 1 Tab by mouth every evening.     • Multiple Vitamins-Minerals (MENS MULTIVITAMIN) Tab Take 1 Tab by mouth every evening.     • simethicone (MYLICON) 80 MG Chew Tab Chew 240 mg one time as needed for Flatulence. 3 tablets = 240 mg.     • emtricitabine-TAF (DESCOVY) 200-25 mg Tab tablet Take 1 Tab by mouth every day. (Patient taking differently: Take 1 Tab by mouth every day. New medication NOT STARTED) 90 Tab 3     No current facility-administered medications for this encounter.         Pretreatment Questions    Any Metal in or around head? No  Taking current medications as prescribed? Yes  Change in medical history or medications since patient last saw physician? No  Change in meals or caffeine use? No  Change in sleep pattern? No   Did patient pre-treat today with any over the counter medication? No   Any problems or changes since patient's last session? No    Was PHQ9 completed today?  No   If yes, Score:       Treatment  Were ear plugs given and inserted by patient? Yes  Was patient positioned correctly? Yes  Did patient have any finger or hand twitching? No  Did patient have any facial discomfort? No  Did patient have any changes to SMT? No  How did patient tolerate  treatment? Patient tolerated well.       Did patient retrieve any belongings they removed before treatment? Yes    See attached Session Report scanned into chart.    @[facility]

## 2021-03-23 ENCOUNTER — HOSPITAL ENCOUNTER (OUTPATIENT)
Dept: BEHAVIORAL HEALTH | Facility: MEDICAL CENTER | Age: 31
End: 2021-03-23
Attending: PSYCHIATRY & NEUROLOGY
Payer: COMMERCIAL

## 2021-03-23 DIAGNOSIS — F41.1 GENERALIZED ANXIETY DISORDER: ICD-10-CM

## 2021-03-23 DIAGNOSIS — F33.2 SEVERE EPISODE OF RECURRENT MAJOR DEPRESSIVE DISORDER, WITHOUT PSYCHOTIC FEATURES (HCC): ICD-10-CM

## 2021-03-23 PROCEDURE — 90868 TCRANIAL MAGN STIM TX DELI: CPT | Performed by: PSYCHIATRY & NEUROLOGY

## 2021-03-24 ENCOUNTER — HOSPITAL ENCOUNTER (OUTPATIENT)
Dept: BEHAVIORAL HEALTH | Facility: MEDICAL CENTER | Age: 31
End: 2021-03-24
Attending: PSYCHIATRY & NEUROLOGY
Payer: COMMERCIAL

## 2021-03-24 DIAGNOSIS — F33.2 SEVERE EPISODE OF RECURRENT MAJOR DEPRESSIVE DISORDER, WITHOUT PSYCHOTIC FEATURES (HCC): ICD-10-CM

## 2021-03-24 PROCEDURE — 90868 TCRANIAL MAGN STIM TX DELI: CPT | Performed by: PSYCHIATRY & NEUROLOGY

## 2021-03-24 ASSESSMENT — PATIENT HEALTH QUESTIONNAIRE - PHQ9
CLINICAL INTERPRETATION OF PHQ2 SCORE: 1
5. POOR APPETITE OR OVEREATING: 1 - SEVERAL DAYS
SUM OF ALL RESPONSES TO PHQ QUESTIONS 1-9: 6

## 2021-03-24 NOTE — ADDENDUM NOTE
Encounter addended by: Jimmie Akers M.D. on: 3/24/2021 8:04 AM   Actions taken: Clinical Note Signed

## 2021-03-24 NOTE — NON-PROVIDER
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 Combine with 300 mg of Effexor XR for a total of 337.5 mg 90 capsule 0   • propranolol (INDERAL) 10 MG Tab Take 1 to 2 tablets by mouth once a day for performance anxiety, take 1 hour prior to performance 60 tablet 0   • venlafaxine (EFFEXOR-XR) 150 MG extended-release capsule Take 2 tablets by mouth once a day 180 Cap 0   • mirtazapine (REMERON) 30 MG Tab tablet Take 1 Tab by mouth every bedtime. 90 Tab 0   • emtricitabine-tenofovir (TRUVADA) 200-300 MG per tablet Take 1 Tab by mouth every evening.     • Multiple Vitamins-Minerals (MENS MULTIVITAMIN) Tab Take 1 Tab by mouth every evening.     • simethicone (MYLICON) 80 MG Chew Tab Chew 240 mg one time as needed for Flatulence. 3 tablets = 240 mg.     • emtricitabine-TAF (DESCOVY) 200-25 mg Tab tablet Take 1 Tab by mouth every day. (Patient taking differently: Take 1 Tab by mouth every day. New medication NOT STARTED) 90 Tab 3     No current facility-administered medications for this encounter.         Pretreatment Questions    Any Metal in or around head? No  Taking current medications as prescribed? Yes  Change in medical history or medications since patient last saw physician? No  Change in meals or caffeine use? No  Change in sleep pattern? No   Did patient pre-treat today with any over the counter medication? No   Any problems or changes since patient's last session? No    Was PHQ9 completed today?  Yes  If yes, Score: 6      Treatment  Were ear plugs given and inserted by patient? Yes  Was patient positioned correctly? Yes  Did patient have any finger or hand twitching? No  Did patient have any facial discomfort? No  Did patient have any changes to SMT? No  How did patient tolerate  treatment? Patient tolerated well. Patient complained of discomfort when beginning treatment. Adjust patient making sure he was in correct position and he continued to feel it, rotated angle to 20 and patient felt as though it was better.     Did patient retrieve any belongings they removed before treatment? Yes    See attached Session Report scanned into chart.    [unfilled]

## 2021-03-25 ENCOUNTER — HOSPITAL ENCOUNTER (OUTPATIENT)
Dept: BEHAVIORAL HEALTH | Facility: MEDICAL CENTER | Age: 31
End: 2021-03-25
Attending: PSYCHIATRY & NEUROLOGY
Payer: COMMERCIAL

## 2021-03-25 DIAGNOSIS — F33.2 SEVERE EPISODE OF RECURRENT MAJOR DEPRESSIVE DISORDER, WITHOUT PSYCHOTIC FEATURES (HCC): ICD-10-CM

## 2021-03-25 PROCEDURE — 90868 TCRANIAL MAGN STIM TX DELI: CPT | Performed by: PSYCHIATRY & NEUROLOGY

## 2021-03-26 ENCOUNTER — TELEMEDICINE (OUTPATIENT)
Dept: BEHAVIORAL HEALTH | Facility: CLINIC | Age: 31
End: 2021-03-26
Payer: COMMERCIAL

## 2021-03-26 ENCOUNTER — HOSPITAL ENCOUNTER (OUTPATIENT)
Dept: BEHAVIORAL HEALTH | Facility: MEDICAL CENTER | Age: 31
End: 2021-03-26
Attending: PSYCHIATRY & NEUROLOGY
Payer: COMMERCIAL

## 2021-03-26 VITALS — WEIGHT: 240 LBS | HEIGHT: 73 IN | BODY MASS INDEX: 31.81 KG/M2

## 2021-03-26 DIAGNOSIS — F33.41 RECURRENT MAJOR DEPRESSIVE DISORDER, IN PARTIAL REMISSION (HCC): ICD-10-CM

## 2021-03-26 DIAGNOSIS — F42.9 OBSESSIVE-COMPULSIVE DISORDER, UNSPECIFIED TYPE: ICD-10-CM

## 2021-03-26 DIAGNOSIS — F41.1 GENERALIZED ANXIETY DISORDER: ICD-10-CM

## 2021-03-26 DIAGNOSIS — G47.09 OTHER INSOMNIA: ICD-10-CM

## 2021-03-26 PROCEDURE — 90868 TCRANIAL MAGN STIM TX DELI: CPT | Performed by: PSYCHIATRY & NEUROLOGY

## 2021-03-26 PROCEDURE — 99214 OFFICE O/P EST MOD 30 MIN: CPT | Mod: 95,CR | Performed by: PSYCHIATRY & NEUROLOGY

## 2021-03-26 PROCEDURE — 90868 TCRANIAL MAGN STIM TX DELI: CPT

## 2021-03-26 ASSESSMENT — PATIENT HEALTH QUESTIONNAIRE - PHQ9
SUM OF ALL RESPONSES TO PHQ QUESTIONS 1-9: 6
CLINICAL INTERPRETATION OF PHQ2 SCORE: 1
5. POOR APPETITE OR OVEREATING: 1 - SEVERAL DAYS

## 2021-03-26 ASSESSMENT — FIBROSIS 4 INDEX: FIB4 SCORE: 0.88

## 2021-03-26 NOTE — PROGRESS NOTES
This evaluation was conducted via Zoom using secure and encrypted videoconferencing technology. The [facility]    The patient's identity was confirmed and verbal consent was obtained for this virtual visit.     PSYCHIATRY FOLLOW-UP NOTE      Name: Justice Infante  MRN: [id number]  : 1990  Age: 30 y.o.  Date of assessment: 3/26/2021  PCP: Demar Dobson M.D.  Persons in attendance: Patient      REASON FOR VISIT/CHIEF COMPLAINT (as stated by Patient):  Justice Infante is a 30 y.o., White male, attending follow-up appointment for 4 week follow up during current TMS treatment.      HISTORY OF PRESENT ILLNESS:  Justice Infante is a 30 y.o. old male with MDD comes in today for follow up.  Patient is currently undergoing TMS treatment for her major depressive disorder and is seen as a follow-up at week 4 during the current TMS treatment.    Patient is noticing marked improvement in depression and anxiety symptoms with TMS in addition to daily medication compliance and engaging in psychotherapy.  Patient scored 6 on PHQ 9 indicating signs of early remission.  Patient talked in length how he is more mindful of his emotional reactivity and is able to catch himself early on before reacting to stressors.  Patient reports improvement in sleep with reduction in mirtazapine to 15 mg with melatonin as as needed.  Patient reports tiredness is something he is struggling with and attributes this to Effexor dosing.  Discussed that sedation in the morning time could also be attributed to mirtazapine and he just reduced the mirtazapine dose to 15 mg 1 week ago and agreed with plan of giving this a little bit more time to assess the improvement and tiredness during daytime.  Patient remained with brighter affect during entire evaluation and is happy with the improvement.    Patient was allowed to ask questions regarding the treatment after TMS is  finished and importance of maintaining medication compliance and doing psychotherapy for persistence of stable mood and anxiety.    I reviewed Dr. Littlejohn's last progress note done on 3/9/2021 with the following recommendations:  1. Continue in TMS for MDD, Severe  2. Continue Increase dose of  Effexor XR from 262.5 mg to 337.5 mg po q am for improved depression/anxiety.   3. Referral placed to [facility] for MDD, score of 23 on his PHQ9 today.  4. Continue [id number] mg po q HS    5. Switched to new therapist, has discontinued working with with ANNE Powers x 3 years. going well.  6. Reviewed prior visit notes and hospitalization notes  7. Again reviewed [facility], he is a rapid metabolizer for both Remeron and Effexor and normal metabolizer for Pristiq, this may be a better option if the patient doesn't respond to a higher dose of Effexor XR.  Wellbutrin would be another option, he has tried it before but it was dosed several times a day and he struggled with compliance.      CURRENT MEDICATIONS:  Current Outpatient Medications   Medication Sig Dispense Refill   • venlafaxine XR (EFFEXOR XR) 37.5 MG CAPSULE SR 24 HR Take 1 capsule by mouth every day. Combine with 300 mg of Effexor XR for a total of 337.5 mg 90 capsule 0   • propranolol (INDERAL) 10 MG Tab Take 1 to 2 tablets by mouth once a day for performance anxiety, take 1 hour prior to performance 60 tablet 0   • venlafaxine (EFFEXOR-XR) 150 MG extended-release capsule Take 2 tablets by mouth once a day 180 Cap 0   • mirtazapine (REMERON) 30 MG Tab tablet Take 1 Tab by mouth every bedtime. 90 Tab 0   • emtricitabine-tenofovir (TRUVADA) 200-300 MG per tablet Take 1 Tab by mouth every evening.     • Multiple Vitamins-Minerals (MENS MULTIVITAMIN) Tab Take 1 Tab by mouth every evening.     • simethicone (MYLICON) 80 MG Chew Tab Chew 240 mg one time as needed for Flatulence. 3 tablets = 240 mg.     • emtricitabine-TAF (DESCOVY) 200-25 mg Tab tablet Take 1 Tab by mouth  "every day. (Patient taking differently: Take 1 Tab by mouth every day. New medication NOT STARTED) 90 Tab 3     No current facility-administered medications for this visit.       MEDICAL HISTORY  Past Medical History:   Diagnosis Date   • Anxiety    • Migraine    • OCD (obsessive compulsive disorder)      Past Surgical History:   Procedure Laterality Date   • PB LAP,DIAGNOSTIC ABDOMEN N/A 1/5/2021    Procedure: LAPAROSCOPY-DIAGNOSTIC;  Surgeon: Geraldo Yepez M.D.;  Location: SURGERY [facility];  Service: General   • RESTORATE HEMOSTAS N/A 1/5/2021    Procedure: CONTROL OF HEMORRHAGE;  Surgeon: Geraldo Yepez M.D.;  Location: SURGERY [facility];  Service: General   • EDUARDO BY LAPAROSCOPY  1/3/2021    Procedure: CHOLECYSTECTOMY, LAPAROSCOPIC;  Surgeon: Re Tracy M.D.;  Location: SURGERY [facility];  Service: General   • APPENDECTOMY  2012       REVIEW OF SYSTEMS:        Constitutional negative   Eyes negative   Ears/Nose/Mouth/Throat negative   Cardiovascular negative   Respiratory negative   Gastrointestinal negative   Genitourinary negative   Muscular negative   Integumentary negative   Neurological negative   Endocrine negative   Hematologic/Lymphatic negative     PHYSICAL EXAMINAION:  Vital signs: Ht 1.854 m (6' 1\")   Wt 109 kg (240 lb)   BMI 31.66 kg/m²   Musculoskeletal: Normal gait.   Abnormal movements: none      MENTAL STATUS EXAMINATION      General:   - Grooming and hygiene: Casual,   - Apparent distress: none,   - Behavior: Calm  - Eye Contact:  Good,   - no psychomotor agitation or retardation    - Participation: Active verbal participation  Orientation: Alert and Fully Oriented to person, place and time  Mood: Euthymic  Affect: Flexible and Full range,  Thought Process: Logical and Goal-directed  Thought Content: Denies suicidal or homicidal ideations, intent or plan Within normal limits  Perception: Denies auditory or visual hallucinations. No delusions noted Within normal limits  Attention " span and concentration: Intact   Speech:Rate within normal limits and Volume within normal limits  Language: Appropriate   Insight: Good  Judgment: Good  Recent and remote memory: No gross evidence of memory deficits        DEPRESSION SCREENING:  Depression Screen (PHQ-2/PHQ-9) 3/3/2021 3/17/2021 3/24/2021   PHQ-2 Total Score - - -   PHQ-2 Total Score - - -   PHQ-2 Total Score 2 2 1   PHQ-9 Total Score - - -   PHQ-9 Total Score 13 9 6       Interpretation of PHQ-9 Total Score   Score Severity   1-4 No Depression   5-9 Mild Depression   10-14 Moderate Depression   15-19 Moderately Severe Depression   20-27 Severe Depression    CURRENT RISK:       Suicidal: Low       Homicidal: Low       Self-Harm: Low       Relapse: Low       Crisis Safety Plan Reviewed Not Indicated       If evidence of imminent risk is present, intervention/plan:      MEDICAL RECORDS/LABS/DIAGNOSTIC TESTS REVIEWED:  No new lab since last visit     [place]  records -   Reviewed       ASSESSMENT & PLAN:  (1) Major depressive disorder  • PHQ9: 6  • Continue current TMS treatment.  • Continue medications prescribed by Dr. Littlejohn.  • The patient was advised to call, message provider on [facility], or come in to the clinic if symptoms worsen or if any future questions/issues regarding their medications arise; the patient verbalized understanding and agreement.  • The patient was educated to call 911, call the suicide hotline, or go to local ER if having thoughts of suicide or homicide; verbalized understanding.      The proposed treatment plan was discussed with the patient who was provided the opportunity to ask questions and make suggestions regarding alternative treatment. Patient verbalized understanding and expressed agreement with the plan.       Jimmie Akers M.D.  03/26/21    This note was created using voice recognition software (Dragon). The accuracy of the dictation is limited by the abilities of the software. I have reviewed the note prior to  signing, however some errors in grammar and context are still possible. If you have any questions related to this note please do not hesitate to contact our office.

## 2021-03-26 NOTE — ADDENDUM NOTE
Encounter addended by: Paty Littlejohn M.D. on: 3/25/2021 9:28 PM   Actions taken: Clinical Note Signed

## 2021-03-26 NOTE — PROGRESS NOTES
Maryebl Kim is following my treatment plan in today's treatment. I reviewed her notes and agree with the today's treatment planning.

## 2021-03-26 NOTE — PROGRESS NOTES
Marybel Kim is following my treatment plan in today's treatment. I reviewed her note and agree with the today's treatment planning.

## 2021-03-26 NOTE — ADDENDUM NOTE
Encounter addended by: Jimmie Akers M.D. on: 3/26/2021 4:41 PM   Actions taken: Clinical Note Signed

## 2021-03-26 NOTE — ADDENDUM NOTE
Encounter addended by: Marybel Kim, Med Ass't on: 3/26/2021 4:32 PM   Actions taken: Charge Capture section accepted

## 2021-03-26 NOTE — ADDENDUM NOTE
Encounter addended by: Paty Littlejohn M.D. on: 3/25/2021 9:26 PM   Actions taken: Clinical Note Signed

## 2021-03-26 NOTE — ADDENDUM NOTE
Encounter addended by: Paty Littlejohn M.D. on: 3/25/2021 9:23 PM   Actions taken: Clinical Note Signed

## 2021-03-29 ENCOUNTER — HOSPITAL ENCOUNTER (OUTPATIENT)
Dept: BEHAVIORAL HEALTH | Facility: MEDICAL CENTER | Age: 31
End: 2021-03-29
Attending: PSYCHIATRY & NEUROLOGY
Payer: COMMERCIAL

## 2021-03-29 DIAGNOSIS — F33.41 RECURRENT MAJOR DEPRESSIVE DISORDER, IN PARTIAL REMISSION (HCC): ICD-10-CM

## 2021-03-29 PROCEDURE — 90868 TCRANIAL MAGN STIM TX DELI: CPT | Performed by: PSYCHIATRY & NEUROLOGY

## 2021-03-29 NOTE — ADDENDUM NOTE
Encounter addended by: Corrine Mcdonough M.D. on: 3/29/2021 12:54 PM   Actions taken: Clinical Note Signed

## 2021-03-29 NOTE — NON-PROVIDER
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 Combine with 300 mg of Effexor XR for a total of 337.5 mg 90 capsule 0   • propranolol (INDERAL) 10 MG Tab Take 1 to 2 tablets by mouth once a day for performance anxiety, take 1 hour prior to performance 60 tablet 0   • venlafaxine (EFFEXOR-XR) 150 MG extended-release capsule Take 2 tablets by mouth once a day 180 Cap 0   • mirtazapine (REMERON) 30 MG Tab tablet Take 1 Tab by mouth every bedtime. 90 Tab 0   • emtricitabine-tenofovir (TRUVADA) 200-300 MG per tablet Take 1 Tab by mouth every evening.     • Multiple Vitamins-Minerals (MENS MULTIVITAMIN) Tab Take 1 Tab by mouth every evening.     • simethicone (MYLICON) 80 MG Chew Tab Chew 240 mg one time as needed for Flatulence. 3 tablets = 240 mg.     • emtricitabine-TAF (DESCOVY) 200-25 mg Tab tablet Take 1 Tab by mouth every day. (Patient taking differently: Take 1 Tab by mouth every day. New medication NOT STARTED) 90 Tab 3     No current facility-administered medications for this encounter.         Pretreatment Questions    Any Metal in or around head? No  Taking current medications as prescribed? Yes  Change in medical history or medications since patient last saw physician? No  Change in meals or caffeine use? No  Change in sleep pattern? No   Did patient pre-treat today with any over the counter medication? Yes, Details: Patient reports taking 1000 mg of excedrin about 5 hours prior to treatment.    Any problems or changes since patient's last session? No    Was PHQ9 completed today?  No   If yes, Score:       Treatment  Were ear plugs given and inserted by patient? Yes  Was patient positioned correctly? Yes  Did patient have any finger or hand twitching? No  Did patient have any  facial discomfort? No  Did patient have any changes to SMT? No  How did patient tolerate treatment? Patient tolerated well. Patient reports having a hard mental day on Saturday. He is unsure on why, but tried to redirect thoughts and went to bed early to try to end the day.      Did patient retrieve any belongings they removed before treatment? Yes    See attached Session Report scanned into chart.    [unfilled]

## 2021-03-30 ENCOUNTER — HOSPITAL ENCOUNTER (OUTPATIENT)
Dept: BEHAVIORAL HEALTH | Facility: MEDICAL CENTER | Age: 31
End: 2021-03-30
Attending: PSYCHIATRY & NEUROLOGY
Payer: COMMERCIAL

## 2021-03-30 DIAGNOSIS — F33.41 RECURRENT MAJOR DEPRESSIVE DISORDER, IN PARTIAL REMISSION (HCC): ICD-10-CM

## 2021-03-30 PROCEDURE — 90868 TCRANIAL MAGN STIM TX DELI: CPT | Performed by: PSYCHIATRY & NEUROLOGY

## 2021-03-30 NOTE — ADDENDUM NOTE
Encounter addended by: Marybel Kim, Med Ass't on: 3/30/2021 4:39 PM   Actions taken: Clinical Note Signed, Visit diagnoses modified, Charge Capture section accepted, Chief Complaint modified

## 2021-03-31 ENCOUNTER — HOSPITAL ENCOUNTER (OUTPATIENT)
Dept: BEHAVIORAL HEALTH | Facility: MEDICAL CENTER | Age: 31
End: 2021-03-31
Attending: PSYCHIATRY & NEUROLOGY
Payer: COMMERCIAL

## 2021-03-31 DIAGNOSIS — F33.41 RECURRENT MAJOR DEPRESSIVE DISORDER, IN PARTIAL REMISSION (HCC): ICD-10-CM

## 2021-03-31 PROCEDURE — 90868 TCRANIAL MAGN STIM TX DELI: CPT | Performed by: PSYCHIATRY & NEUROLOGY

## 2021-03-31 NOTE — ADDENDUM NOTE
Encounter addended by: Corrine Mcdonough M.D. on: 3/31/2021 2:17 PM   Actions taken: Clinical Note Signed

## 2021-03-31 NOTE — NON-PROVIDER
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 Combine with 300 mg of Effexor XR for a total of 337.5 mg 90 capsule 0   • propranolol (INDERAL) 10 MG Tab Take 1 to 2 tablets by mouth once a day for performance anxiety, take 1 hour prior to performance 60 tablet 0   • venlafaxine (EFFEXOR-XR) 150 MG extended-release capsule Take 2 tablets by mouth once a day 180 Cap 0   • mirtazapine (REMERON) 30 MG Tab tablet Take 1 Tab by mouth every bedtime. 90 Tab 0   • emtricitabine-tenofovir (TRUVADA) 200-300 MG per tablet Take 1 Tab by mouth every evening.     • Multiple Vitamins-Minerals (MENS MULTIVITAMIN) Tab Take 1 Tab by mouth every evening.     • simethicone (MYLICON) 80 MG Chew Tab Chew 240 mg one time as needed for Flatulence. 3 tablets = 240 mg.     • emtricitabine-TAF (DESCOVY) 200-25 mg Tab tablet Take 1 Tab by mouth every day. (Patient taking differently: Take 1 Tab by mouth every day. New medication NOT STARTED) 90 Tab 3     No current facility-administered medications for this encounter.         Pretreatment Questions    Any Metal in or around head? No  Taking current medications as prescribed? Yes  Change in medical history or medications since patient last saw physician? No  Change in meals or caffeine use? No  Change in sleep pattern? No   Did patient pre-treat today with any over the counter medication? No   Any problems or changes since patient's last session? No    Was PHQ9 completed today?  Yes  If yes, Score:       Treatment  Were ear plugs given and inserted by patient? Yes  Was patient positioned correctly? Yes  Did patient have any finger or hand twitching? No  Did patient have any facial discomfort? No  Did patient have any changes to SMT? No  How did patient tolerate  treatment? Patient tolerated well.     Did patient retrieve any belongings they removed before treatment? Yes    See attached Session Report scanned into chart.    [unfilled]

## 2021-03-31 NOTE — PROGRESS NOTES
Marybel Kim MA is following my treatment plan in today's treatment. I reviewed her note and agree with the today's treatment planning.

## 2021-04-01 ENCOUNTER — HOSPITAL ENCOUNTER (OUTPATIENT)
Dept: BEHAVIORAL HEALTH | Facility: MEDICAL CENTER | Age: 31
End: 2021-04-01
Attending: PSYCHIATRY & NEUROLOGY
Payer: COMMERCIAL

## 2021-04-01 DIAGNOSIS — F33.41 RECURRENT MAJOR DEPRESSIVE DISORDER, IN PARTIAL REMISSION (HCC): ICD-10-CM

## 2021-04-01 PROCEDURE — 90868 TCRANIAL MAGN STIM TX DELI: CPT | Performed by: PSYCHIATRY & NEUROLOGY

## 2021-04-01 ASSESSMENT — PATIENT HEALTH QUESTIONNAIRE - PHQ9
5. POOR APPETITE OR OVEREATING: 1 - SEVERAL DAYS
SUM OF ALL RESPONSES TO PHQ QUESTIONS 1-9: 6
CLINICAL INTERPRETATION OF PHQ2 SCORE: 2

## 2021-04-02 ENCOUNTER — HOSPITAL ENCOUNTER (OUTPATIENT)
Dept: BEHAVIORAL HEALTH | Facility: MEDICAL CENTER | Age: 31
End: 2021-04-02
Attending: PSYCHIATRY & NEUROLOGY
Payer: COMMERCIAL

## 2021-04-02 DIAGNOSIS — F33.41 RECURRENT MAJOR DEPRESSIVE DISORDER, IN PARTIAL REMISSION (HCC): ICD-10-CM

## 2021-04-02 PROCEDURE — 90868 TCRANIAL MAGN STIM TX DELI: CPT | Performed by: PSYCHIATRY & NEUROLOGY

## 2021-04-02 NOTE — ADDENDUM NOTE
Encounter addended by: Janusz Lanier M.D. on: 4/1/2021 6:03 PM   Actions taken: Clinical Note Signed

## 2021-04-05 ENCOUNTER — HOSPITAL ENCOUNTER (OUTPATIENT)
Dept: BEHAVIORAL HEALTH | Facility: MEDICAL CENTER | Age: 31
End: 2021-04-05
Attending: PSYCHIATRY & NEUROLOGY
Payer: COMMERCIAL

## 2021-04-05 DIAGNOSIS — F33.41 RECURRENT MAJOR DEPRESSIVE DISORDER, IN PARTIAL REMISSION (HCC): ICD-10-CM

## 2021-04-05 PROCEDURE — 90868 TCRANIAL MAGN STIM TX DELI: CPT | Performed by: PSYCHIATRY & NEUROLOGY

## 2021-04-05 NOTE — NON-PROVIDER
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 Combine with 300 mg of Effexor XR for a total of 337.5 mg 90 capsule 0   • propranolol (INDERAL) 10 MG Tab Take 1 to 2 tablets by mouth once a day for performance anxiety, take 1 hour prior to performance 60 tablet 0   • venlafaxine (EFFEXOR-XR) 150 MG extended-release capsule Take 2 tablets by mouth once a day 180 Cap 0   • mirtazapine (REMERON) 30 MG Tab tablet Take 1 Tab by mouth every bedtime. 90 Tab 0   • emtricitabine-tenofovir (TRUVADA) 200-300 MG per tablet Take 1 Tab by mouth every evening.     • Multiple Vitamins-Minerals (MENS MULTIVITAMIN) Tab Take 1 Tab by mouth every evening.     • simethicone (MYLICON) 80 MG Chew Tab Chew 240 mg one time as needed for Flatulence. 3 tablets = 240 mg.     • emtricitabine-TAF (DESCOVY) 200-25 mg Tab tablet Take 1 Tab by mouth every day. (Patient taking differently: Take 1 Tab by mouth every day. New medication NOT STARTED) 90 Tab 3     No current facility-administered medications for this encounter.         Pretreatment Questions    Any Metal in or around head? No  Taking current medications as prescribed? Yes  Change in medical history or medications since patient last saw physician? No  Change in meals or caffeine use? No  Change in sleep pattern? No   Did patient pre-treat today with any over the counter medication? No. Patient reports taking ibuprofen 600 mg this morning.    Any problems or changes since patient's last session? No    Was PHQ9 completed today?  No   If yes, Score:       Treatment  Were ear plugs given and inserted by patient? Yes  Was patient positioned correctly? Yes  Did patient have any finger or hand twitching? No  Did patient have any facial discomfort? No  Did patient  have any changes to SMT? No  How did patient tolerate treatment? Patient tolerated well.       Did patient retrieve any belongings they removed before treatment? Yes    See attached Session Report scanned into chart.    [unfilled]

## 2021-04-05 NOTE — ADDENDUM NOTE
Encounter addended by: Corrine Mcdonough M.D. on: 4/5/2021 10:50 AM   Actions taken: Clinical Note Signed

## 2021-04-06 ENCOUNTER — HOSPITAL ENCOUNTER (OUTPATIENT)
Dept: BEHAVIORAL HEALTH | Facility: MEDICAL CENTER | Age: 31
End: 2021-04-06
Attending: PSYCHIATRY & NEUROLOGY
Payer: COMMERCIAL

## 2021-04-06 DIAGNOSIS — F33.41 RECURRENT MAJOR DEPRESSIVE DISORDER, IN PARTIAL REMISSION (HCC): ICD-10-CM

## 2021-04-06 PROCEDURE — 90868 TCRANIAL MAGN STIM TX DELI: CPT | Performed by: PSYCHIATRY & NEUROLOGY

## 2021-04-06 ASSESSMENT — PATIENT HEALTH QUESTIONNAIRE - PHQ9
5. POOR APPETITE OR OVEREATING: 1 - SEVERAL DAYS
SUM OF ALL RESPONSES TO PHQ QUESTIONS 1-9: 5
CLINICAL INTERPRETATION OF PHQ2 SCORE: 1

## 2021-04-06 NOTE — ADDENDUM NOTE
Encounter addended by: Marybel Kim, Med Ass't on: 4/6/2021 4:53 PM   Actions taken: SmartForm saved, Clinical Note Signed

## 2021-04-06 NOTE — NON-PROVIDER
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 Combine with 300 mg of Effexor XR for a total of 337.5 mg 90 capsule 0   • propranolol (INDERAL) 10 MG Tab Take 1 to 2 tablets by mouth once a day for performance anxiety, take 1 hour prior to performance 60 tablet 0   • venlafaxine (EFFEXOR-XR) 150 MG extended-release capsule Take 2 tablets by mouth once a day 180 Cap 0   • mirtazapine (REMERON) 30 MG Tab tablet Take 1 Tab by mouth every bedtime. 90 Tab 0   • emtricitabine-tenofovir (TRUVADA) 200-300 MG per tablet Take 1 Tab by mouth every evening.     • Multiple Vitamins-Minerals (MENS MULTIVITAMIN) Tab Take 1 Tab by mouth every evening.     • simethicone (MYLICON) 80 MG Chew Tab Chew 240 mg one time as needed for Flatulence. 3 tablets = 240 mg.     • emtricitabine-TAF (DESCOVY) 200-25 mg Tab tablet Take 1 Tab by mouth every day. (Patient taking differently: Take 1 Tab by mouth every day. New medication NOT STARTED) 90 Tab 3     No current facility-administered medications for this encounter.         Pretreatment Questions    Any Metal in or around head? No  Taking current medications as prescribed? Yes  Change in medical history or medications since patient last saw physician? No  Change in meals or caffeine use? No  Change in sleep pattern? No   Did patient pre-treat today with any over the counter medication? No. Patient reports taking 1000 mg of excedrin this am for a headache.    Any problems or changes since patient's last session? No    Was PHQ9 completed today?  No   If yes, Score: 5      Treatment  Were ear plugs given and inserted by patient? Yes  Was patient positioned correctly? Yes  Did patient have any finger or hand twitching? No  Did patient have any facial discomfort?  No  Did patient have any changes to SMT? No  How did patient tolerate treatment? Patient tolerated well.       Did patient retrieve any belongings they removed before treatment? Yes    See attached Session Report scanned into chart.    [unfilled]

## 2021-04-06 NOTE — ADDENDUM NOTE
Encounter addended by: Corrine Mcdonough M.D. on: 4/6/2021 8:58 AM   Actions taken: Clinical Note Signed

## 2021-04-12 ENCOUNTER — HOSPITAL ENCOUNTER (OUTPATIENT)
Dept: BEHAVIORAL HEALTH | Facility: MEDICAL CENTER | Age: 31
End: 2021-04-12
Attending: PSYCHIATRY & NEUROLOGY
Payer: COMMERCIAL

## 2021-04-12 DIAGNOSIS — F33.41 RECURRENT MAJOR DEPRESSIVE DISORDER, IN PARTIAL REMISSION (HCC): ICD-10-CM

## 2021-04-12 DIAGNOSIS — E78.5 DYSLIPIDEMIA: ICD-10-CM

## 2021-04-12 PROCEDURE — 90868 TCRANIAL MAGN STIM TX DELI: CPT | Performed by: PSYCHIATRY & NEUROLOGY

## 2021-04-12 NOTE — NON-PROVIDER
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 Combine with 300 mg of Effexor XR for a total of 337.5 mg 90 capsule 0   • propranolol (INDERAL) 10 MG Tab Take 1 to 2 tablets by mouth once a day for performance anxiety, take 1 hour prior to performance 60 tablet 0   • venlafaxine (EFFEXOR-XR) 150 MG extended-release capsule Take 2 tablets by mouth once a day 180 Cap 0   • mirtazapine (REMERON) 30 MG Tab tablet Take 1 Tab by mouth every bedtime. 90 Tab 0   • emtricitabine-tenofovir (TRUVADA) 200-300 MG per tablet Take 1 Tab by mouth every evening.     • Multiple Vitamins-Minerals (MENS MULTIVITAMIN) Tab Take 1 Tab by mouth every evening.     • simethicone (MYLICON) 80 MG Chew Tab Chew 240 mg one time as needed for Flatulence. 3 tablets = 240 mg.     • emtricitabine-TAF (DESCOVY) 200-25 mg Tab tablet Take 1 Tab by mouth every day. (Patient taking differently: Take 1 Tab by mouth every day. New medication NOT STARTED) 90 Tab 3     No current facility-administered medications for this encounter.         Pretreatment Questions    Any Metal in or around head? No  Taking current medications as prescribed? Yes  Change in medical history or medications since patient last saw physician? No  Change in meals or caffeine use? No  Change in sleep pattern? No   Did patient pre-treat today with any over the counter medication? No   Any problems or changes since patient's last session? No    Was PHQ9 completed today?  No   If yes, Score:       Treatment  Were ear plugs given and inserted by patient? Yes  Was patient positioned correctly? Yes  Did patient have any finger or hand twitching? No  Did patient have any facial discomfort? No  Did patient have any changes to SMT? No  How did patient tolerate  treatment? Patient tolerated well. Patient reports having food poisening this weekend but is now feeling better.     Did patient retrieve any belongings they removed before treatment? Yes    See attached Session Report scanned into chart.    [unfilled]

## 2021-04-14 ENCOUNTER — HOSPITAL ENCOUNTER (OUTPATIENT)
Dept: BEHAVIORAL HEALTH | Facility: MEDICAL CENTER | Age: 31
End: 2021-04-14
Attending: PSYCHIATRY & NEUROLOGY
Payer: COMMERCIAL

## 2021-04-14 DIAGNOSIS — F33.41 RECURRENT MAJOR DEPRESSIVE DISORDER, IN PARTIAL REMISSION (HCC): ICD-10-CM

## 2021-04-14 PROCEDURE — 90868 TCRANIAL MAGN STIM TX DELI: CPT | Performed by: PSYCHIATRY & NEUROLOGY

## 2021-04-14 ASSESSMENT — PATIENT HEALTH QUESTIONNAIRE - PHQ9
5. POOR APPETITE OR OVEREATING: 1 - SEVERAL DAYS
CLINICAL INTERPRETATION OF PHQ2 SCORE: 1
SUM OF ALL RESPONSES TO PHQ QUESTIONS 1-9: 4

## 2021-04-14 NOTE — NON-PROVIDER
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 Combine with 300 mg of Effexor XR for a total of 337.5 mg 90 capsule 0   • propranolol (INDERAL) 10 MG Tab Take 1 to 2 tablets by mouth once a day for performance anxiety, take 1 hour prior to performance 60 tablet 0   • venlafaxine (EFFEXOR-XR) 150 MG extended-release capsule Take 2 tablets by mouth once a day 180 Cap 0   • mirtazapine (REMERON) 30 MG Tab tablet Take 1 Tab by mouth every bedtime. 90 Tab 0   • emtricitabine-tenofovir (TRUVADA) 200-300 MG per tablet Take 1 Tab by mouth every evening.     • Multiple Vitamins-Minerals (MENS MULTIVITAMIN) Tab Take 1 Tab by mouth every evening.     • simethicone (MYLICON) 80 MG Chew Tab Chew 240 mg one time as needed for Flatulence. 3 tablets = 240 mg.     • emtricitabine-TAF (DESCOVY) 200-25 mg Tab tablet Take 1 Tab by mouth every day. (Patient taking differently: Take 1 Tab by mouth every day. New medication NOT STARTED) 90 Tab 3     No current facility-administered medications for this encounter.         Pretreatment Questions    Any Metal in or around head? No  Taking current medications as prescribed? Yes  Change in medical history or medications since patient last saw physician? No  Change in meals or caffeine use? No  Change in sleep pattern? No   Did patient pre-treat today with any over the counter medication? No   Any problems or changes since patient's last session? No    Was PHQ9 completed today?  Yes  If yes, Score: 4/27      Treatment  Were ear plugs given and inserted by patient? Yes  Was patient positioned correctly? Yes  Did patient have any finger or hand twitching? No  Did patient have any facial discomfort? No  Did patient have any changes to SMT? No  How did patient tolerate  treatment? Patient tolerated well.       Did patient retrieve any belongings they removed before treatment? Yes    See attached Session Report scanned into chart.    [unfilled]

## 2021-04-16 ENCOUNTER — HOSPITAL ENCOUNTER (OUTPATIENT)
Dept: BEHAVIORAL HEALTH | Facility: MEDICAL CENTER | Age: 31
End: 2021-04-16
Attending: PSYCHIATRY & NEUROLOGY
Payer: COMMERCIAL

## 2021-04-16 DIAGNOSIS — F33.41 RECURRENT MAJOR DEPRESSIVE DISORDER, IN PARTIAL REMISSION (HCC): ICD-10-CM

## 2021-04-16 PROCEDURE — 90868 TCRANIAL MAGN STIM TX DELI: CPT | Performed by: PSYCHIATRY & NEUROLOGY

## 2021-04-16 NOTE — ADDENDUM NOTE
Encounter addended by: Jimmie Akers M.D. on: 4/16/2021 4:54 PM   Actions taken: Clinical Note Signed

## 2021-04-19 NOTE — ADDENDUM NOTE
Encounter addended by: Corrine Mcdonough M.D. on: 4/19/2021 11:08 AM   Actions taken: Clinical Note Signed

## 2021-04-20 ENCOUNTER — APPOINTMENT (OUTPATIENT)
Dept: BEHAVIORAL HEALTH | Facility: MEDICAL CENTER | Age: 31
End: 2021-04-20
Attending: PSYCHIATRY & NEUROLOGY
Payer: COMMERCIAL

## 2021-04-21 ENCOUNTER — HOSPITAL ENCOUNTER (OUTPATIENT)
Dept: BEHAVIORAL HEALTH | Facility: MEDICAL CENTER | Age: 31
End: 2021-04-21
Attending: PSYCHIATRY & NEUROLOGY
Payer: COMMERCIAL

## 2021-04-22 ENCOUNTER — HOSPITAL ENCOUNTER (OUTPATIENT)
Dept: BEHAVIORAL HEALTH | Facility: MEDICAL CENTER | Age: 31
End: 2021-04-22
Attending: PSYCHIATRY & NEUROLOGY
Payer: COMMERCIAL

## 2021-04-22 DIAGNOSIS — F33.41 RECURRENT MAJOR DEPRESSIVE DISORDER, IN PARTIAL REMISSION (HCC): ICD-10-CM

## 2021-04-22 PROCEDURE — 90868 TCRANIAL MAGN STIM TX DELI: CPT | Performed by: PSYCHIATRY & NEUROLOGY

## 2021-04-22 NOTE — NON-PROVIDER
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 Combine with 300 mg of Effexor XR for a total of 337.5 mg 90 capsule 0   • propranolol (INDERAL) 10 MG Tab Take 1 to 2 tablets by mouth once a day for performance anxiety, take 1 hour prior to performance 60 tablet 0   • venlafaxine (EFFEXOR-XR) 150 MG extended-release capsule Take 2 tablets by mouth once a day 180 Cap 0   • mirtazapine (REMERON) 30 MG Tab tablet Take 1 Tab by mouth every bedtime. 90 Tab 0   • emtricitabine-tenofovir (TRUVADA) 200-300 MG per tablet Take 1 Tab by mouth every evening.     • Multiple Vitamins-Minerals (MENS MULTIVITAMIN) Tab Take 1 Tab by mouth every evening.     • simethicone (MYLICON) 80 MG Chew Tab Chew 240 mg one time as needed for Flatulence. 3 tablets = 240 mg.     • emtricitabine-TAF (DESCOVY) 200-25 mg Tab tablet Take 1 Tab by mouth every day. (Patient taking differently: Take 1 Tab by mouth every day. New medication NOT STARTED) 90 Tab 3     No current facility-administered medications for this encounter.         Pretreatment Questions    Any Metal in or around head? No  Taking current medications as prescribed? Yes  Change in medical history or medications since patient last saw physician? No  Change in meals or caffeine use? No  Change in sleep pattern? No   Did patient pre-treat today with any over the counter medication? No   Any problems or changes since patient's last session? No    Was PHQ9 completed today?  No   If yes, Score:       Treatment  Were ear plugs given and inserted by patient? Yes  Was patient positioned correctly? Yes  Did patient have any finger or hand twitching? No  Did patient have any facial discomfort? No  Did patient have any changes to SMT? No  How did patient tolerate  "treatment? Patient tolerated well. Patient reports feel \"blah.\" He states he has been working alone this last week and possibly is feeling isolated. Patient has asked about additional sessions and has been informed of the protocol. Patient will finish treatment and discuss with his provider if TMS should be continued.      Did patient retrieve any belongings they removed before treatment? Yes    See attached Session Report scanned into chart.    [unfilled]    "

## 2021-04-23 NOTE — NON-PROVIDER
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 Combine with 300 mg of Effexor XR for a total of 337.5 mg 90 capsule 0   • propranolol (INDERAL) 10 MG Tab Take 1 to 2 tablets by mouth once a day for performance anxiety, take 1 hour prior to performance 60 tablet 0   • venlafaxine (EFFEXOR-XR) 150 MG extended-release capsule Take 2 tablets by mouth once a day 180 Cap 0   • mirtazapine (REMERON) 30 MG Tab tablet Take 1 Tab by mouth every bedtime. 90 Tab 0   • emtricitabine-tenofovir (TRUVADA) 200-300 MG per tablet Take 1 Tab by mouth every evening.     • Multiple Vitamins-Minerals (MENS MULTIVITAMIN) Tab Take 1 Tab by mouth every evening.     • simethicone (MYLICON) 80 MG Chew Tab Chew 240 mg one time as needed for Flatulence. 3 tablets = 240 mg.     • emtricitabine-TAF (DESCOVY) 200-25 mg Tab tablet Take 1 Tab by mouth every day. (Patient taking differently: Take 1 Tab by mouth every day. New medication NOT STARTED) 90 Tab 3     No current facility-administered medications for this encounter.         Pretreatment Questions    Any Metal in or around head? No  Taking current medications as prescribed? Yes  Change in medical history or medications since patient last saw physician? No  Change in meals or caffeine use? No  Change in sleep pattern? No   Did patient pre-treat today with any over the counter medication? No   Any problems or changes since patient's last session? No    Was PHQ9 completed today?  No   If yes, Score:       Treatment  Were ear plugs given and inserted by patient? Yes  Was patient positioned correctly? Yes  Did patient have any finger or hand twitching? No  Did patient have any facial discomfort? No  Did patient have any changes to SMT? No  How did patient tolerate  treatment? Tolerated well.    Did patient retrieve any belongings they removed before treatment? Yes    See attached Session Report scanned into chart.    [unfilled]

## 2021-04-27 NOTE — PROGRESS NOTES
Anders Bustos is following my treatment plan in today's treatment. I reviewed his note and agree with the today's treatment planning.

## 2021-04-29 ENCOUNTER — HOSPITAL ENCOUNTER (OUTPATIENT)
Dept: BEHAVIORAL HEALTH | Facility: MEDICAL CENTER | Age: 31
End: 2021-04-29
Attending: PSYCHIATRY & NEUROLOGY
Payer: COMMERCIAL

## 2021-04-29 DIAGNOSIS — F33.41 RECURRENT MAJOR DEPRESSIVE DISORDER, IN PARTIAL REMISSION (HCC): ICD-10-CM

## 2021-04-29 PROCEDURE — 90868 TCRANIAL MAGN STIM TX DELI: CPT

## 2021-04-29 PROCEDURE — 90868 TCRANIAL MAGN STIM TX DELI: CPT | Performed by: PSYCHIATRY & NEUROLOGY

## 2021-04-29 ASSESSMENT — PATIENT HEALTH QUESTIONNAIRE - PHQ9: CLINICAL INTERPRETATION OF PHQ2 SCORE: 0

## 2021-04-29 NOTE — NON-PROVIDER
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 Combine with 300 mg of Effexor XR for a total of 337.5 mg 90 capsule 0   • propranolol (INDERAL) 10 MG Tab Take 1 to 2 tablets by mouth once a day for performance anxiety, take 1 hour prior to performance 60 tablet 0   • venlafaxine (EFFEXOR-XR) 150 MG extended-release capsule Take 2 tablets by mouth once a day 180 Cap 0   • mirtazapine (REMERON) 30 MG Tab tablet Take 1 Tab by mouth every bedtime. 90 Tab 0   • emtricitabine-tenofovir (TRUVADA) 200-300 MG per tablet Take 1 Tab by mouth every evening.     • Multiple Vitamins-Minerals (MENS MULTIVITAMIN) Tab Take 1 Tab by mouth every evening.     • simethicone (MYLICON) 80 MG Chew Tab Chew 240 mg one time as needed for Flatulence. 3 tablets = 240 mg.     • emtricitabine-TAF (DESCOVY) 200-25 mg Tab tablet Take 1 Tab by mouth every day. (Patient taking differently: Take 1 Tab by mouth every day. New medication NOT STARTED) 90 Tab 3     No current facility-administered medications for this encounter.         Pretreatment Questions    Any Metal in or around head? No  Taking current medications as prescribed? Yes  Change in medical history or medications since patient last saw physician? No  Change in meals or caffeine use? No  Change in sleep pattern? No   Did patient pre-treat today with any over the counter medication? No   Any problems or changes since patient's last session? No    Was PHQ9 completed today?  Yes  If yes, Score: 3  BDI: 7      Treatment  Were ear plugs given and inserted by patient? Yes  Was patient positioned correctly? Yes  Did patient have any finger or hand twitching? No  Did patient have any facial discomfort? No  Did patient have any changes to SMT? No  How did patient  tolerate treatment? Patient tolerated well.     Did patient retrieve any belongings they removed before treatment? Yes    See attached Session Report scanned into chart.    [unfilled]

## 2021-05-05 ENCOUNTER — TELEMEDICINE (OUTPATIENT)
Dept: BEHAVIORAL HEALTH | Facility: CLINIC | Age: 31
End: 2021-05-05
Payer: COMMERCIAL

## 2021-05-05 DIAGNOSIS — G47.09 OTHER INSOMNIA: ICD-10-CM

## 2021-05-05 PROCEDURE — 90833 PSYTX W PT W E/M 30 MIN: CPT | Mod: 95 | Performed by: PSYCHIATRY & NEUROLOGY

## 2021-05-05 PROCEDURE — 99212 OFFICE O/P EST SF 10 MIN: CPT | Mod: 95 | Performed by: PSYCHIATRY & NEUROLOGY

## 2021-05-05 RX ORDER — VENLAFAXINE HYDROCHLORIDE 150 MG/1
CAPSULE, EXTENDED RELEASE ORAL
Qty: 180 CAPSULE | Refills: 0 | Status: SHIPPED | OUTPATIENT
Start: 2021-05-05 | End: 2021-08-03 | Stop reason: SDUPTHER
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
## 2021-05-07 NOTE — PROCEDURES
.Marybel Kim is following my treatment plan in today's treatment. I reviewed her notes and agree with the today's treatment planning.

## 2021-07-13 ENCOUNTER — OFFICE VISIT (OUTPATIENT)
Dept: URGENT CARE | Facility: CLINIC | Age: 31
End: 2021-07-13
Payer: COMMERCIAL

## 2021-07-13 ENCOUNTER — APPOINTMENT (OUTPATIENT)
Dept: RADIOLOGY | Facility: IMAGING CENTER | Age: 31
End: 2021-07-13
Attending: NURSE PRACTITIONER
Payer: COMMERCIAL

## 2021-07-13 VITALS
HEIGHT: 73 IN | TEMPERATURE: 98.2 F | WEIGHT: 240 LBS | SYSTOLIC BLOOD PRESSURE: 124 MMHG | OXYGEN SATURATION: 96 % | DIASTOLIC BLOOD PRESSURE: 82 MMHG | HEART RATE: 99 BPM | RESPIRATION RATE: 16 BRPM | BODY MASS INDEX: 31.81 KG/M2

## 2021-07-13 DIAGNOSIS — R07.89 CHEST TIGHTNESS: ICD-10-CM

## 2021-07-13 DIAGNOSIS — K21.9 GASTROESOPHAGEAL REFLUX DISEASE, UNSPECIFIED WHETHER ESOPHAGITIS PRESENT: ICD-10-CM

## 2021-07-13 PROCEDURE — 93000 ELECTROCARDIOGRAM COMPLETE: CPT | Performed by: NURSE PRACTITIONER

## 2021-07-13 PROCEDURE — 99214 OFFICE O/P EST MOD 30 MIN: CPT | Performed by: NURSE PRACTITIONER

## 2021-07-13 PROCEDURE — 71046 X-RAY EXAM CHEST 2 VIEWS: CPT | Mod: TC | Performed by: NURSE PRACTITIONER

## 2021-07-13 RX ORDER — ALBUTEROL SULFATE 90 UG/1
2 AEROSOL, METERED RESPIRATORY (INHALATION) EVERY 6 HOURS PRN
Qty: 8.5 G | Refills: 0 | Status: SHIPPED | OUTPATIENT
Start: 2021-07-13

## 2021-07-13 ASSESSMENT — ENCOUNTER SYMPTOMS
HEARTBURN: 1
IRREGULAR HEARTBEAT: 0
PND: 0
CHILLS: 0
LEG PAIN: 0
SORE THROAT: 0
PALPITATIONS: 0
DIAPHORESIS: 0
VOMITING: 0
SHORTNESS OF BREATH: 1
EXERTIONAL CHEST PRESSURE: 0
COUGH: 0
SPUTUM PRODUCTION: 0
FEVER: 0
NEAR-SYNCOPE: 0
SYNCOPE: 0
NAUSEA: 0
LOWER EXTREMITY EDEMA: 0
EYE REDNESS: 0
DIZZINESS: 0
ABDOMINAL PAIN: 0
MYALGIAS: 0
CLAUDICATION: 0
ORTHOPNEA: 0
DIARRHEA: 0

## 2021-07-13 ASSESSMENT — FIBROSIS 4 INDEX: FIB4 SCORE: 0.91

## 2021-07-14 NOTE — PROGRESS NOTES
Subjective:   Justice Infante is a 31 y.o. male who presents for Shortness of Breath (AC was out for two days, since then he has had a tightness in the chest)      Chest Pain   This is a new problem. Episode onset: 2 days; onset of symptoms after he was in his apartment which was very hot without an air conditioner. The onset quality is undetermined. The problem occurs constantly. The problem has been unchanged. The pain is present in the substernal region. The pain is at a severity of 5/10. The pain is moderate. The quality of the pain is described as tightness. The pain does not radiate. Associated symptoms include shortness of breath. Pertinent negatives include no abdominal pain, claudication, cough, diaphoresis, dizziness, exertional chest pressure, fever, irregular heartbeat, leg pain, lower extremity edema, nausea, near-syncope, orthopnea, palpitations, PND, sputum production, syncope or vomiting. The pain is aggravated by nothing. He has tried nothing for the symptoms. The treatment provided no relief. Risk factors include male gender.   Pertinent negatives for past medical history include no cancer, no diabetes, no MI and no PE.       Review of Systems   Constitutional: Negative for chills, diaphoresis and fever.   HENT: Negative for sore throat.    Eyes: Negative for redness.   Respiratory: Positive for shortness of breath. Negative for cough and sputum production.    Cardiovascular: Positive for chest pain. Negative for palpitations, orthopnea, claudication, leg swelling, syncope, PND and near-syncope.   Gastrointestinal: Positive for heartburn. Negative for abdominal pain, diarrhea, nausea and vomiting.   Genitourinary: Negative for dysuria.   Musculoskeletal: Negative for myalgias.   Skin: Negative for rash.   Neurological: Negative for dizziness.       Medications:    • Descovy Tabs  • mirtazapine Tabs  • propranolol Tabs  • venlafaxine    Allergies: Patient has no known  "allergies.    Problem List: Justice Infante does not have any pertinent problems on file.    Surgical History:  Past Surgical History:   Procedure Laterality Date   • PB LAP,DIAGNOSTIC ABDOMEN N/A 1/5/2021    Procedure: LAPAROSCOPY-DIAGNOSTIC;  Surgeon: Geraldo Yepez M.D.;  Location: SURGERY [facility];  Service: General   • RESTORATE HEMOSTAS N/A 1/5/2021    Procedure: CONTROL OF HEMORRHAGE;  Surgeon: Geraldo Yepez M.D.;  Location: SURGERY [facility];  Service: General   • EDUARDO BY LAPAROSCOPY  1/3/2021    Procedure: CHOLECYSTECTOMY, LAPAROSCOPIC;  Surgeon: Re Tracy M.D.;  Location: SURGERY [facility];  Service: General   • APPENDECTOMY  2012       Past Social Hx: Justice Infante  reports that he has been smoking cigarettes. He has been smoking about 0.25 packs per day. He has never used smokeless tobacco. He reports that he does not drink alcohol and does not use drugs.     Past Family Hx:  Justice Infante family history includes Alcohol/Drug in his father and mother.     Problem list, medications, and allergies reviewed by myself today in Epic.     Objective:     /82 (BP Location: Left arm, Patient Position: Sitting, BP Cuff Size: Adult long)   Pulse 99   Temp 36.8 °C (98.2 °F) (Temporal)   Resp 16   Ht 1.854 m (6' 1\")   Wt 109 kg (240 lb)   SpO2 96%   BMI 31.66 kg/m²     Physical Exam  Vitals and nursing note reviewed.   Constitutional:       General: He is not in acute distress.     Appearance: He is well-developed.   HENT:      Head: Normocephalic and atraumatic.      Right Ear: External ear normal.      Left Ear: External ear normal.      Nose: Nose normal.      Mouth/Throat:      Mouth: Mucous membranes are moist.   Eyes:      Conjunctiva/sclera: Conjunctivae normal.   Cardiovascular:      Rate and Rhythm: Normal rate.      Pulses: Normal pulses.      Heart sounds: Normal heart sounds, S1 normal and S2 normal.   Pulmonary:      Effort: Pulmonary " effort is normal. No respiratory distress.      Breath sounds: Normal breath sounds.   Chest:      Chest wall: No swelling, tenderness or crepitus.   Abdominal:      General: There is no distension.   Musculoskeletal:         General: Normal range of motion.   Skin:     General: Skin is warm and dry.   Neurological:      General: No focal deficit present.      Mental Status: He is alert and oriented to person, place, and time. Mental status is at baseline.      Gait: Gait (gait at baseline) normal.   Psychiatric:         Judgment: Judgment normal.         Assessment/Plan:     Diagnosis and associated orders:     1. Chest tightness  EKG - Clinic Performed    DX-CHEST-2 VIEWS    albuterol 108 (90 Base) MCG/ACT Aero Soln inhalation aerosol   2. Gastroesophageal reflux disease, unspecified whether esophagitis present          Comments/MDM:   • I independently reviewed the patient's imaging and agree with the interpretation of the radiologist.    1.  No acute cardiopulmonary disease.    EKG interpreted by provider    Rate: Normal  Rhythm: Regular Sinus   QRS Complex: Narrow   Axis: Normal  Interval/Conduction: Normal  Enlargement: None    Ischemia:      ST Segments: no acute change      T Waves: no acute change      Q Waves: none    Comparison: NONE    Clinical Impression: no acute changes and normal EKG, see scanned sheet  Patient is a 31-year-old male present with the stated above.  Patient having undiagnosed new problem.  Overall physical exam fairly benign, vital signs stable, EKG with no acute ischemia, patient having no risk factors, ACS score 0.  Discussed clinical findings with patient.  Will trial albuterol inhaler as needed.  Also recommended over-the-counter PPI Prilosec.  I personally reviewed prior external notes and prior test results pertinent to today's visit.   Discussed management options, risks and benefits, and alternatives to treatment plan agreed upon.   Red flags discussed and indications to  immediately call 911 or present to the Emergency Department.   Supportive care, differential diagnoses, and indications for immediate follow-up discussed with patient.    Patient expresses understanding and agrees to plan. Patient denies any other questions or concerns.                Please note that this dictation was created using voice recognition software. I have made a reasonable attempt to correct obvious errors, but I expect that there are errors of grammar and possibly content that I did not discover before finalizing the note.    This note was electronically signed by Willis BAIRD

## 2021-08-03 ENCOUNTER — TELEMEDICINE (OUTPATIENT)
Dept: BEHAVIORAL HEALTH | Facility: CLINIC | Age: 31
End: 2021-08-03
Payer: COMMERCIAL

## 2021-08-03 DIAGNOSIS — G47.09 OTHER INSOMNIA: ICD-10-CM

## 2021-08-03 DIAGNOSIS — F33.41 RECURRENT MAJOR DEPRESSIVE DISORDER, IN PARTIAL REMISSION (HCC): Primary | ICD-10-CM

## 2021-08-03 PROCEDURE — 90833 PSYTX W PT W E/M 30 MIN: CPT | Mod: 95 | Performed by: PSYCHIATRY & NEUROLOGY

## 2021-08-03 PROCEDURE — 99214 OFFICE O/P EST MOD 30 MIN: CPT | Mod: 95 | Performed by: PSYCHIATRY & NEUROLOGY

## 2021-08-03 RX ORDER — MIRTAZAPINE 15 MG/1
15 TABLET, FILM COATED ORAL
Qty: 90 TABLET | Refills: 0 | Status: SHIPPED | OUTPATIENT
Start: 2021-08-03 | End: 2021-12-22

## 2021-08-03 RX ORDER — VENLAFAXINE HYDROCHLORIDE 37.5 MG/1
CAPSULE, EXTENDED RELEASE ORAL
Qty: 90 CAPSULE | Refills: 0 | Status: SHIPPED | OUTPATIENT
Start: 2021-08-03 | End: 2021-09-14 | Stop reason: SDUPTHER

## 2021-08-03 RX ORDER — VENLAFAXINE HYDROCHLORIDE 75 MG/1
75 CAPSULE, EXTENDED RELEASE ORAL DAILY
Qty: 90 CAPSULE | Refills: 0 | Status: SHIPPED | OUTPATIENT
Start: 2021-08-03 | End: 2021-09-14 | Stop reason: SDUPTHER

## 2021-08-03 RX ORDER — VENLAFAXINE HYDROCHLORIDE 150 MG/1
CAPSULE, EXTENDED RELEASE ORAL
Qty: 90 CAPSULE | Refills: 0 | Status: SHIPPED | OUTPATIENT
Start: 2021-08-03 | End: 2021-09-14 | Stop reason: SDUPTHER
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
## 2021-09-14 ENCOUNTER — TELEMEDICINE (OUTPATIENT)
Dept: BEHAVIORAL HEALTH | Facility: CLINIC | Age: 31
End: 2021-09-14
Payer: COMMERCIAL

## 2021-09-14 DIAGNOSIS — F41.1 GENERALIZED ANXIETY DISORDER: ICD-10-CM

## 2021-09-14 DIAGNOSIS — G47.09 OTHER INSOMNIA: ICD-10-CM

## 2021-09-14 DIAGNOSIS — F33.41 RECURRENT MAJOR DEPRESSIVE DISORDER, IN PARTIAL REMISSION (HCC): ICD-10-CM

## 2021-09-14 PROCEDURE — 90833 PSYTX W PT W E/M 30 MIN: CPT | Mod: 95 | Performed by: PSYCHIATRY & NEUROLOGY

## 2021-09-14 PROCEDURE — 99214 OFFICE O/P EST MOD 30 MIN: CPT | Mod: 95 | Performed by: PSYCHIATRY & NEUROLOGY

## 2021-09-14 RX ORDER — VENLAFAXINE HYDROCHLORIDE 150 MG/1
CAPSULE, EXTENDED RELEASE ORAL
Qty: 90 CAPSULE | Refills: 0 | Status: SHIPPED | OUTPATIENT
Start: 2021-09-14 | End: 2022-02-18 | Stop reason: SDUPTHER

## 2021-09-14 RX ORDER — VENLAFAXINE HYDROCHLORIDE 37.5 MG/1
CAPSULE, EXTENDED RELEASE ORAL
Qty: 90 CAPSULE | Refills: 0 | Status: SHIPPED | OUTPATIENT
Start: 2021-09-14 | End: 2021-10-14

## 2021-09-14 RX ORDER — VENLAFAXINE HYDROCHLORIDE 75 MG/1
75 CAPSULE, EXTENDED RELEASE ORAL DAILY
Qty: 90 CAPSULE | Refills: 0 | Status: SHIPPED | OUTPATIENT
Start: 2021-09-14 | End: 2021-10-15
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
## 2021-10-14 ENCOUNTER — TELEMEDICINE (OUTPATIENT)
Dept: BEHAVIORAL HEALTH | Facility: CLINIC | Age: 31
End: 2021-10-14
Payer: COMMERCIAL

## 2021-10-14 DIAGNOSIS — F33.41 RECURRENT MAJOR DEPRESSIVE DISORDER, IN PARTIAL REMISSION (HCC): ICD-10-CM

## 2021-10-14 DIAGNOSIS — G47.09 OTHER INSOMNIA: ICD-10-CM

## 2021-10-14 DIAGNOSIS — F41.1 GENERALIZED ANXIETY DISORDER: ICD-10-CM

## 2021-10-14 PROCEDURE — 90833 PSYTX W PT W E/M 30 MIN: CPT | Mod: 95 | Performed by: PSYCHIATRY & NEUROLOGY

## 2021-10-14 PROCEDURE — 99214 OFFICE O/P EST MOD 30 MIN: CPT | Mod: 95 | Performed by: PSYCHIATRY & NEUROLOGY
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
## 2021-10-15 ENCOUNTER — TELEPHONE (OUTPATIENT)
Dept: BEHAVIORAL HEALTH | Facility: MEDICAL CENTER | Age: 31
End: 2021-10-15

## 2021-10-15 DIAGNOSIS — F33.41 RECURRENT MAJOR DEPRESSIVE DISORDER, IN PARTIAL REMISSION (HCC): ICD-10-CM

## 2021-10-15 RX ORDER — VENLAFAXINE HYDROCHLORIDE 75 MG/1
CAPSULE, EXTENDED RELEASE ORAL
Qty: 90 CAPSULE | Refills: 0 | Status: SHIPPED | OUTPATIENT
Start: 2021-10-15 | End: 2022-02-18 | Stop reason: SDUPTHER

## 2021-10-25 ENCOUNTER — GROUP THERAPY (OUTPATIENT)
Dept: BEHAVIORAL HEALTH | Facility: CLINIC | Age: 31
End: 2021-10-25
Payer: COMMERCIAL

## 2021-10-25 DIAGNOSIS — F33.41 RECURRENT MAJOR DEPRESSIVE DISORDER, IN PARTIAL REMISSION (HCC): ICD-10-CM

## 2021-10-25 DIAGNOSIS — F41.1 GENERALIZED ANXIETY DISORDER: ICD-10-CM

## 2021-10-25 PROCEDURE — 90853 GROUP PSYCHOTHERAPY: CPT | Performed by: MARRIAGE & FAMILY THERAPIST

## 2021-10-26 NOTE — GROUP NOTE
Group Appointment Information    Date: 10/25/21   Attendance Duration: 60 minutes  Number of Participants: 5 participants  Program / Group: Outpatient Therapy  Topics Covered: Anxiety Mgmt and Other (Comment):        Group Therapy Start Time:  5:00 PM    Attendance: Attended  Participation: Active verbal participation and Attentive    Affect/Mood Range: Normal range and Flexible  Affect/Mood Display: CWC - Congruent w/Content  Cognition: Alert and Oriented    Evidence of imminent suicide risk: No   Evidence of imminent homicide risk: No     Therapeutic Interventions: Emotion clarification and Supportive psychotherapy  Progress Toward Treatment Goal: Moderate improvement

## 2021-11-01 ENCOUNTER — APPOINTMENT (OUTPATIENT)
Dept: BEHAVIORAL HEALTH | Facility: CLINIC | Age: 31
End: 2021-11-01
Payer: COMMERCIAL

## 2021-11-08 ENCOUNTER — GROUP THERAPY (OUTPATIENT)
Dept: BEHAVIORAL HEALTH | Facility: CLINIC | Age: 31
End: 2021-11-08
Payer: COMMERCIAL

## 2021-11-08 DIAGNOSIS — F33.41 RECURRENT MAJOR DEPRESSIVE DISORDER, IN PARTIAL REMISSION (HCC): ICD-10-CM

## 2021-11-08 DIAGNOSIS — F41.1 GENERALIZED ANXIETY DISORDER: ICD-10-CM

## 2021-11-08 PROCEDURE — 90853 GROUP PSYCHOTHERAPY: CPT | Performed by: MARRIAGE & FAMILY THERAPIST

## 2021-11-09 NOTE — GROUP NOTE
"Group Appointment Information    Date: 11/08/21   Attendance Duration: 60 minutes  Number of Participants: 4 participants  Program / Group: Outpatient Therapy  Topics Covered: Anxiety Mgmt        Group Therapy Start Time:  5:00 PM    Attendance: Attended  Participation: Active verbal participation    Affect/Mood Range: Normal range and Flexible  Affect/Mood Display: CWC - Congruent w/Content  Cognition: Alert and Oriented    Evidence of imminent suicide risk: No   Evidence of imminent homicide risk: No     Therapeutic Interventions: Emotion clarification and Supportive psychotherapy  Progress Toward Treatment Goal: Mild improvement; \"I want to stay out of 'toxic' relationships.\"     "

## 2021-11-15 ENCOUNTER — GROUP THERAPY (OUTPATIENT)
Dept: BEHAVIORAL HEALTH | Facility: CLINIC | Age: 31
End: 2021-11-15
Payer: COMMERCIAL

## 2021-11-15 DIAGNOSIS — F41.1 GENERALIZED ANXIETY DISORDER: ICD-10-CM

## 2021-11-15 DIAGNOSIS — F33.41 RECURRENT MAJOR DEPRESSIVE DISORDER, IN PARTIAL REMISSION (HCC): ICD-10-CM

## 2021-11-15 PROCEDURE — 90853 GROUP PSYCHOTHERAPY: CPT | Performed by: MARRIAGE & FAMILY THERAPIST

## 2021-11-16 NOTE — GROUP NOTE
"Group Appointment Information    Date: 11/15/21   Attendance Duration: 60 minutes  Number of Participants: 5 participants  Program / Group: Outpatient Therapy  Topics Covered: Stress Management        Group Therapy Start Time:  5:00 PM    Attendance: Attended  Participation: Active verbal participation and Attentive    Affect/Mood Range: Normal range and Flexible  Affect/Mood Display: CWC - Congruent w/Content  Cognition: Alert and Oriented    Evidence of imminent suicide risk: No   Evidence of imminent homicide risk: No     Therapeutic Interventions: Emotion clarification and Supportive psychotherapy  Progress Toward Treatment Goal: Moderate improvement; \"I had a terrible experience the last time I was sick and in the hospital.  I thought I was going to die there.\"     "

## 2021-11-29 ENCOUNTER — GROUP THERAPY (OUTPATIENT)
Dept: BEHAVIORAL HEALTH | Facility: CLINIC | Age: 31
End: 2021-11-29
Payer: COMMERCIAL

## 2021-11-29 DIAGNOSIS — F41.1 GENERALIZED ANXIETY DISORDER: ICD-10-CM

## 2021-11-29 DIAGNOSIS — F33.41 RECURRENT MAJOR DEPRESSIVE DISORDER, IN PARTIAL REMISSION (HCC): ICD-10-CM

## 2021-11-29 PROCEDURE — 90853 GROUP PSYCHOTHERAPY: CPT | Performed by: MARRIAGE & FAMILY THERAPIST

## 2021-11-30 NOTE — GROUP NOTE
"Group Appointment Information    Date: 11/29/21   Attendance Duration: 60 minutes  Number of Participants: 4 participants  Program / Group: Outpatient Therapy  Topics Covered: Other (Comment): Depression, anxiety and Autonomic Nervouse System Regulation        Group Therapy Start Time:  5:00 PM    Attendance: Attended  Participation: Active verbal participation and Attentive    Affect/Mood Range: Normal range and Flexible  Affect/Mood Display: CWC - Congruent w/Content  Cognition: Alert and Oriented    Evidence of imminent suicide risk: No   Evidence of imminent homicide risk: No     Therapeutic Interventions: Supportive psychotherapy  Progress Toward Treatment Goal: Moderate improvement; \"I forgive myself for the things I might have done when I was just trying to survive.  I think we should all do that.\"     "

## 2021-12-06 ENCOUNTER — GROUP THERAPY (OUTPATIENT)
Dept: BEHAVIORAL HEALTH | Facility: CLINIC | Age: 31
End: 2021-12-06
Payer: COMMERCIAL

## 2021-12-06 DIAGNOSIS — F33.1 MODERATE EPISODE OF RECURRENT MAJOR DEPRESSIVE DISORDER (HCC): ICD-10-CM

## 2021-12-06 PROCEDURE — 90853 GROUP PSYCHOTHERAPY: CPT | Performed by: MARRIAGE & FAMILY THERAPIST

## 2021-12-07 NOTE — GROUP NOTE
"Group Appointment Information    Date: 12/07/21   Attendance Duration: 60 minutes  Number of Participants: 2 participants  Program / Group: Outpatient Therapy  Topics Covered: Anxiety Mgmt, Depression Recovery, and Regulating emotions        Group Therapy Start Time:  5:00 PM    Attendance: Attended  Participation: Active verbal participation and Attentive    Affect/Mood Range: Normal range  Affect/Mood Display: CWC - Congruent w/Content  Cognition: Alert and Oriented    Evidence of imminent suicide risk: No   Evidence of imminent homicide risk: No     Therapeutic Interventions: Emotion clarification and Supportive psychotherapy  Progress Toward Treatment Goal: Mild improvement; \"I am feeling sad and angry that I was not selected for the job I interviewed for, but I am going to continue to apply for other jobs.\"  Patient appeared discouraged, but also reported that he has received encouragement from friends and coworkers.  While he feels \"alone,\" this experience shed some light on the notion that he does have some support.     "

## 2021-12-13 ENCOUNTER — OFFICE VISIT (OUTPATIENT)
Dept: BEHAVIORAL HEALTH | Facility: CLINIC | Age: 31
End: 2021-12-13
Payer: COMMERCIAL

## 2021-12-13 DIAGNOSIS — F33.41 RECURRENT MAJOR DEPRESSIVE DISORDER, IN PARTIAL REMISSION (HCC): ICD-10-CM

## 2021-12-13 PROCEDURE — 90834 PSYTX W PT 45 MINUTES: CPT | Performed by: MARRIAGE & FAMILY THERAPIST

## 2021-12-14 NOTE — PROGRESS NOTES
" [facility]  Therapy Progress Note    Patient Name: Justice Infante  Patient MRN: [id number]  Today's Date: 12/14/2021     Type of session:Individual psychotherapy  Length of session: 45 minutes  Persons in attendance:Patient    Subjective/New Info: Patient reported that he had been turned down for a job at his current workplace, and that after this happened, and after group counseling last week he decided to ask for a salary increase.  He feels anxious and uncertain about how this might turn out, but he is also taking college classes and keeping his options open.  Patient has a comprehensive plan for improving and maintaining good mental health.  He demonstrates industriousness and perseverance.  He reports to have self doubt and automatic negative thoughts even so.      Objective/Observations:   Participation: Active verbal participation   Grooming: Casual   Cognition: Alert and Fully Oriented   Eye contact: Good   Mood: Euthymic   Affect: Flexible and Full range   Thought process: Logical and Goal-directed   Speech: Rate within normal limits and Volume within normal limits   Other:     Diagnoses:   1. Recurrent major depressive disorder, in partial remission (HCC)         Current risk:   SUICIDE: Low   Homicide: Low   Self-harm: Low   Relapse: Low   Other:    Safety Plan reviewed? Not Indicated   If evidence of imminent risk is present, intervention/plan:     Therapeutic Intervention(s): Establish rapport, Goal-setting, Problem-solving, Relaxation exercise, Stressors assessed and Supportive psychotherapy    Treatment Goal(s)/Objective(s) addressed: Patient will work on challenging his negative cognitions and continue relaxation and meditation practice      Progress toward Treatment Goals: Moderate improvement    Plan:  - \"Homework\" recommendation: Practice Mindfulness and utilize skills of CBT to challenge automatic negative thoughts.    Toby Farnsworth, " ALEKSANDRA  12/14/2021

## 2021-12-20 ENCOUNTER — GROUP THERAPY (OUTPATIENT)
Dept: BEHAVIORAL HEALTH | Facility: CLINIC | Age: 31
End: 2021-12-20
Payer: COMMERCIAL

## 2021-12-20 DIAGNOSIS — F33.41 RECURRENT MAJOR DEPRESSIVE DISORDER, IN PARTIAL REMISSION (HCC): ICD-10-CM

## 2021-12-20 DIAGNOSIS — F41.1 GENERALIZED ANXIETY DISORDER: ICD-10-CM

## 2021-12-20 PROCEDURE — 90853 GROUP PSYCHOTHERAPY: CPT | Performed by: MARRIAGE & FAMILY THERAPIST

## 2021-12-21 NOTE — GROUP NOTE
"Group Appointment Information    Date: 12/21/21   Attendance Duration: 60 minutes  Number of Participants: 2 participants  Program / Group: Outpatient Therapy  Topics Covered: Other (Comment): Process of coping activities and self-compassion        Group Therapy Start Time:  5:00 PM    Attendance: Attended  Participation: Active verbal participation    Affect/Mood Range: Normal range and Flexible  Affect/Mood Display: CWC - Congruent w/Content  Cognition: Alert and Oriented    Evidence of imminent suicide risk: No   Evidence of imminent homicide risk: No     Therapeutic Interventions: Emotion clarification, Supportive psychotherapy, Interpersonal effectiveness skills, Communication skills, Leisure and Recreation skills and Mindfulness exercise  Progress Toward Treatment Goal: Moderate improvement; \"I am waiting to hear about a raise I asked for at work. I feel that they don't really value me.\"     "

## 2021-12-22 DIAGNOSIS — F33.41 RECURRENT MAJOR DEPRESSIVE DISORDER, IN PARTIAL REMISSION (HCC): ICD-10-CM

## 2021-12-22 RX ORDER — MIRTAZAPINE 15 MG/1
15 TABLET, FILM COATED ORAL
Qty: 90 TABLET | Refills: 0 | Status: SHIPPED | OUTPATIENT
Start: 2021-12-22 | End: 2022-02-18 | Stop reason: SDUPTHER

## 2021-12-27 ENCOUNTER — GROUP THERAPY (OUTPATIENT)
Dept: BEHAVIORAL HEALTH | Facility: CLINIC | Age: 31
End: 2021-12-27
Payer: COMMERCIAL

## 2021-12-27 DIAGNOSIS — F33.41 RECURRENT MAJOR DEPRESSIVE DISORDER, IN PARTIAL REMISSION (HCC): ICD-10-CM

## 2021-12-27 DIAGNOSIS — F41.1 GENERALIZED ANXIETY DISORDER: ICD-10-CM

## 2021-12-27 PROCEDURE — 90853 GROUP PSYCHOTHERAPY: CPT | Performed by: MARRIAGE & FAMILY THERAPIST

## 2021-12-28 NOTE — GROUP NOTE
"Group Appointment Information    Date: 12/28/21   Attendance Duration: 60 minutes  Number of Participants: 2 participants  Program / Group: Outpatient Therapy  Topics Covered: Other (Comment):Processing difficult emotions/ depression and anxiety.        Group Therapy Start Time:  5:00 PM    Attendance: Attended  Participation: Active verbal participation and Attentive    Affect/Mood Range: Normal range and Flexible  Affect/Mood Display: CWC - Congruent w/Content  Cognition: Alert and Oriented    Evidence of imminent suicide risk: No   Evidence of imminent homicide risk: No     Therapeutic Interventions: Emotion clarification and Supportive psychotherapy  Progress Toward Treatment Goal: Moderate improvement; \"I am learning and working on only trying to control those things I can and to not perseverate on what I cannot control.   This is more helpful.\"    "

## 2022-01-03 ENCOUNTER — APPOINTMENT (OUTPATIENT)
Dept: BEHAVIORAL HEALTH | Facility: CLINIC | Age: 32
End: 2022-01-03
Payer: COMMERCIAL

## 2022-01-10 ENCOUNTER — GROUP THERAPY (OUTPATIENT)
Dept: BEHAVIORAL HEALTH | Facility: CLINIC | Age: 32
End: 2022-01-10
Payer: COMMERCIAL

## 2022-01-10 DIAGNOSIS — F33.41 RECURRENT MAJOR DEPRESSIVE DISORDER, IN PARTIAL REMISSION (HCC): ICD-10-CM

## 2022-01-10 DIAGNOSIS — F41.1 GENERALIZED ANXIETY DISORDER: ICD-10-CM

## 2022-01-10 PROCEDURE — 90853 GROUP PSYCHOTHERAPY: CPT | Performed by: MARRIAGE & FAMILY THERAPIST

## 2022-01-11 NOTE — GROUP NOTE
"Group Appointment Information    Date: 01/10/22   Attendance Duration: 60 minutes  Number of Participants: 2 participants  Program / Group: Outpatient Therapy  Topics Covered: Other (Comment):Patients were introduced to the conceptual framework of how we can regulate our autonomic nervous system to relieve and modulate autonomic activation.         Group Therapy Start Time:  5:00 PM    Attendance: Attended  Participation: Active verbal participation and Attentive    Affect/Mood Range: Normal range and Flexible  Affect/Mood Display: CWC - Congruent w/Content  Cognition: Alert and Oriented    Evidence of imminent suicide risk: No   Evidence of imminent homicide risk: No     Therapeutic Interventions: Emotion clarification and Supportive psychotherapy  Progress Toward Treatment Goal: Moderate improvement; \" I am looking for new employment and exerting more agency in my life rather than allowing circumstances to control me.\"     "

## 2022-01-31 ENCOUNTER — GROUP THERAPY (OUTPATIENT)
Dept: BEHAVIORAL HEALTH | Facility: CLINIC | Age: 32
End: 2022-01-31
Payer: COMMERCIAL

## 2022-01-31 DIAGNOSIS — F33.41 RECURRENT MAJOR DEPRESSIVE DISORDER, IN PARTIAL REMISSION (HCC): ICD-10-CM

## 2022-01-31 PROCEDURE — 90853 GROUP PSYCHOTHERAPY: CPT | Performed by: MARRIAGE & FAMILY THERAPIST

## 2022-02-01 NOTE — GROUP NOTE
Group Appointment Information    Date: 01/31/22   Attendance Duration: 60 minutes  Number of Participants: 3 participants  Program / Group: Outpatient Therapy  Topics Covered: Other (Comment):patients processed how to handle difficult emotions and build emotional regulation skills.         Group Therapy Start Time:  5:00 PM    Attendance: Attended  Participation: Active verbal participation and Attentive    Affect/Mood Range: Normal range and Flexible  Affect/Mood Display: CWC - Congruent w/Content  Cognition: Alert and Oriented    Evidence of imminent suicide risk: No   Evidence of imminent homicide risk: No     Therapeutic Interventions: Emotion clarification and Supportive psychotherapy  Progress Toward Treatment Goal: Moderate improvement; Patient reported he was offered a job for more money and fel validated....and this helped him regulate his feelings such that he was able to make a decision that was most beneficial.

## 2022-02-07 ENCOUNTER — GROUP THERAPY (OUTPATIENT)
Dept: BEHAVIORAL HEALTH | Facility: CLINIC | Age: 32
End: 2022-02-07
Payer: COMMERCIAL

## 2022-02-07 DIAGNOSIS — F41.1 GENERALIZED ANXIETY DISORDER: ICD-10-CM

## 2022-02-07 DIAGNOSIS — F33.41 RECURRENT MAJOR DEPRESSIVE DISORDER, IN PARTIAL REMISSION (HCC): ICD-10-CM

## 2022-02-07 PROCEDURE — 90853 GROUP PSYCHOTHERAPY: CPT | Performed by: MARRIAGE & FAMILY THERAPIST

## 2022-02-08 NOTE — GROUP NOTE
Group Appointment Information    Date: 02/07/22   Attendance Duration: 60 minutes  Number of Participants: 3 participants  Program / Group: Outpatient Therapy  Topics Covered: Other (Comment):processed handling difficult emotions and ways to regulate autonomic nervous system activation.         Group Therapy Start Time:  5:00 PM    Attendance: Attended  Participation: Active verbal participation and Attentive    Affect/Mood Range: Normal range  Affect/Mood Display: CWC - Congruent w/Content  Cognition: Alert and Oriented    Evidence of imminent suicide risk: No   Evidence of imminent homicide risk: No     Therapeutic Interventions: Emotion clarification and Supportive psychotherapy  Progress Toward Treatment Goal: Moderate improvement; patient reported that he is feeling dysregulated because his work is not getting back to him on whether he will receive a raise he requested.  He has tried cognitive reappraisal and has moments when he feels less disturbed by how his request is being put off by his employer.

## 2022-02-14 ENCOUNTER — GROUP THERAPY (OUTPATIENT)
Dept: BEHAVIORAL HEALTH | Facility: CLINIC | Age: 32
End: 2022-02-14
Payer: COMMERCIAL

## 2022-02-14 DIAGNOSIS — F33.1 MODERATE EPISODE OF RECURRENT MAJOR DEPRESSIVE DISORDER (HCC): ICD-10-CM

## 2022-02-14 DIAGNOSIS — F41.1 GENERALIZED ANXIETY DISORDER: ICD-10-CM

## 2022-02-14 PROCEDURE — 90853 GROUP PSYCHOTHERAPY: CPT | Performed by: MARRIAGE & FAMILY THERAPIST

## 2022-02-15 NOTE — GROUP NOTE
"Group Appointment Information    Date: 02/15/22   Attendance Duration: 60 minutes  Number of Participants: 3 participants  Program / Group: Outpatient Therapy  Topics Covered: Other (Comment):building self awareness, handling difficult relationships and practicing self care.        Group Therapy Start Time:  5:00 PM    Attendance: Attended  Participation: Active verbal participation and Attentive    Affect/Mood Range: Normal range and Flexible  Affect/Mood Display: CWC - Congruent w/Content  Cognition: Alert and Oriented    Evidence of imminent suicide risk: No   Evidence of imminent homicide risk: No     Therapeutic Interventions: Emotion clarification and Supportive psychotherapy  Progress Toward Treatment Goal: Moderate improvement; \"I feel conflicted about whether I should move back to live with my grandparents who are struggling with everyday living.  They took me in for five years when I needed them.     "

## 2022-02-18 ENCOUNTER — TELEMEDICINE (OUTPATIENT)
Dept: BEHAVIORAL HEALTH | Facility: CLINIC | Age: 32
End: 2022-02-18
Payer: COMMERCIAL

## 2022-02-18 DIAGNOSIS — G47.09 OTHER INSOMNIA: ICD-10-CM

## 2022-02-18 DIAGNOSIS — F33.41 RECURRENT MAJOR DEPRESSIVE DISORDER, IN PARTIAL REMISSION (HCC): ICD-10-CM

## 2022-02-18 DIAGNOSIS — F41.1 GENERALIZED ANXIETY DISORDER: ICD-10-CM

## 2022-02-18 PROCEDURE — 90833 PSYTX W PT W E/M 30 MIN: CPT | Mod: 95 | Performed by: PSYCHIATRY & NEUROLOGY

## 2022-02-18 PROCEDURE — 99214 OFFICE O/P EST MOD 30 MIN: CPT | Mod: 95 | Performed by: PSYCHIATRY & NEUROLOGY

## 2022-02-18 RX ORDER — MIRTAZAPINE 15 MG/1
15 TABLET, FILM COATED ORAL
Qty: 90 TABLET | Refills: 0 | Status: SHIPPED | OUTPATIENT
Start: 2022-02-18 | End: 2022-03-24 | Stop reason: SDUPTHER

## 2022-02-18 RX ORDER — VENLAFAXINE HYDROCHLORIDE 150 MG/1
CAPSULE, EXTENDED RELEASE ORAL
Qty: 90 CAPSULE | Refills: 0 | Status: SHIPPED | OUTPATIENT
Start: 2022-02-18 | End: 2022-03-24 | Stop reason: SDUPTHER

## 2022-02-18 RX ORDER — VENLAFAXINE HYDROCHLORIDE 75 MG/1
CAPSULE, EXTENDED RELEASE ORAL
Qty: 90 CAPSULE | Refills: 0 | Status: SHIPPED | OUTPATIENT
Start: 2022-02-18 | End: 2022-03-24 | Stop reason: SDUPTHER

## 2022-02-18 ASSESSMENT — PATIENT HEALTH QUESTIONNAIRE - PHQ9
5. POOR APPETITE OR OVEREATING: 0 - NOT AT ALL
CLINICAL INTERPRETATION OF PHQ2 SCORE: 0
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
## 2022-02-28 ENCOUNTER — APPOINTMENT (OUTPATIENT)
Dept: BEHAVIORAL HEALTH | Facility: CLINIC | Age: 32
End: 2022-02-28
Payer: COMMERCIAL

## 2022-03-07 ENCOUNTER — GROUP THERAPY (OUTPATIENT)
Dept: BEHAVIORAL HEALTH | Facility: CLINIC | Age: 32
End: 2022-03-07
Payer: COMMERCIAL

## 2022-03-07 DIAGNOSIS — F33.41 RECURRENT MAJOR DEPRESSIVE DISORDER, IN PARTIAL REMISSION (HCC): ICD-10-CM

## 2022-03-07 PROCEDURE — 90853 GROUP PSYCHOTHERAPY: CPT | Performed by: MARRIAGE & FAMILY THERAPIST

## 2022-03-08 NOTE — GROUP NOTE
Group Appointment Information    Date: 03/07/22   Attendance Duration: 60 minutes  Number of Participants: 4 participants  Program / Group: IOP - Intensive Outpatient Program  Topics Covered: Caring for Ourselves        Group Therapy Start Time:  5:00 PM    Attendance: Attended  Participation: Active verbal participation and Attentive    Affect/Mood Range: Normal range and Flexible  Affect/Mood Display: CWC - Congruent w/Content  Cognition: Alert and Oriented    Evidence of imminent suicide risk: No   Evidence of imminent homicide risk: No     Therapeutic Interventions: Emotion clarification and Supportive psychotherapy  Progress Toward Treatment Goal: Moderate improvement; patient was very supportive to new participants.  He was encouraging and self-reflective.

## 2022-03-14 ENCOUNTER — GROUP THERAPY (OUTPATIENT)
Dept: BEHAVIORAL HEALTH | Facility: CLINIC | Age: 32
End: 2022-03-14
Payer: COMMERCIAL

## 2022-03-14 DIAGNOSIS — F41.1 GENERALIZED ANXIETY DISORDER: ICD-10-CM

## 2022-03-14 DIAGNOSIS — F33.41 RECURRENT MAJOR DEPRESSIVE DISORDER, IN PARTIAL REMISSION (HCC): ICD-10-CM

## 2022-03-14 PROCEDURE — 90853 GROUP PSYCHOTHERAPY: CPT | Performed by: MARRIAGE & FAMILY THERAPIST

## 2022-03-15 NOTE — GROUP NOTE
Group Appointment Information    Date: 03/15/22   Attendance Duration: 60 minutes  Number of Participants: 2 participants  Program / Group: Outpatient Therapy  Topics Covered: Other (Comment): Coping with negative thoughts and practicing mindfulness.         Group Therapy Start Time:  5:00 PM    Attendance: Attended  Participation: Active verbal participation and Attentive    Affect/Mood Range: Normal range  Affect/Mood Display: CWC - Congruent w/Content  Cognition: Alert and Oriented    Evidence of imminent suicide risk: No   Evidence of imminent homicide risk: No     Therapeutic Interventions: Emotion clarification and Supportive psychotherapy  Progress Toward Treatment Goal: Significant improvement; patient recently graduated online college and he is feeling somewhat let down by the lack of opportunity to celebrate.  We processed the use of cognitive diffusion to help with over identifying with negative feelings.

## 2022-03-21 ENCOUNTER — GROUP THERAPY (OUTPATIENT)
Dept: BEHAVIORAL HEALTH | Facility: CLINIC | Age: 32
End: 2022-03-21
Payer: COMMERCIAL

## 2022-03-21 DIAGNOSIS — F33.41 RECURRENT MAJOR DEPRESSIVE DISORDER, IN PARTIAL REMISSION (HCC): ICD-10-CM

## 2022-03-21 DIAGNOSIS — F41.1 GENERALIZED ANXIETY DISORDER: ICD-10-CM

## 2022-03-21 PROCEDURE — 90853 GROUP PSYCHOTHERAPY: CPT | Performed by: MARRIAGE & FAMILY THERAPIST

## 2022-03-22 NOTE — GROUP NOTE
Group Appointment Information    Date: 03/22/22   Attendance Duration: 60 minutes  Number of Participants: 4 participants  Program / Group: IOP - Intensive Outpatient Program  Topics Covered: Regulating emotions        Group Therapy Start Time:  5:00 PM    Attendance: Attended  Participation: Active verbal participation and Attentive    Affect/Mood Range: Normal range and Flexible  Affect/Mood Display: CWC - Congruent w/Content  Cognition: Alert and Oriented    Evidence of imminent suicide risk: No   Evidence of imminent homicide risk: No     Therapeutic Interventions: Supportive psychotherapy  Progress Toward Treatment Goal: Significant improvement; patient struggles with depression, however, he reports that he is managing his negative cognitions better.

## 2022-03-24 ENCOUNTER — TELEMEDICINE (OUTPATIENT)
Dept: BEHAVIORAL HEALTH | Facility: CLINIC | Age: 32
End: 2022-03-24
Payer: COMMERCIAL

## 2022-03-24 DIAGNOSIS — F33.41 RECURRENT MAJOR DEPRESSIVE DISORDER, IN PARTIAL REMISSION (HCC): ICD-10-CM

## 2022-03-24 DIAGNOSIS — G47.09 OTHER INSOMNIA: ICD-10-CM

## 2022-03-24 DIAGNOSIS — F41.1 GENERALIZED ANXIETY DISORDER: ICD-10-CM

## 2022-03-24 PROCEDURE — 90833 PSYTX W PT W E/M 30 MIN: CPT | Mod: GT | Performed by: PSYCHIATRY & NEUROLOGY

## 2022-03-24 PROCEDURE — 99214 OFFICE O/P EST MOD 30 MIN: CPT | Mod: GT | Performed by: PSYCHIATRY & NEUROLOGY

## 2022-03-24 RX ORDER — VENLAFAXINE HYDROCHLORIDE 150 MG/1
CAPSULE, EXTENDED RELEASE ORAL
Qty: 90 CAPSULE | Refills: 0 | Status: SHIPPED | OUTPATIENT
Start: 2022-03-24 | End: 2022-08-19 | Stop reason: SDUPTHER

## 2022-03-24 RX ORDER — MIRTAZAPINE 15 MG/1
15 TABLET, FILM COATED ORAL
Qty: 90 TABLET | Refills: 0 | Status: SHIPPED | OUTPATIENT
Start: 2022-03-24 | End: 2022-08-19 | Stop reason: SDUPTHER

## 2022-03-24 RX ORDER — VENLAFAXINE HYDROCHLORIDE 75 MG/1
CAPSULE, EXTENDED RELEASE ORAL
Qty: 90 CAPSULE | Refills: 0 | Status: SHIPPED | OUTPATIENT
Start: 2022-03-24 | End: 2022-08-19 | Stop reason: SDUPTHER

## 2022-03-24 ASSESSMENT — PATIENT HEALTH QUESTIONNAIRE - PHQ9
4. FEELING TIRED OR HAVING LITTLE ENERGY: SEVERAL DAYS
SUM OF ALL RESPONSES TO PHQ QUESTIONS 1-9: 8
2. FEELING DOWN, DEPRESSED, IRRITABLE, OR HOPELESS: SEVERAL DAYS
1. LITTLE INTEREST OR PLEASURE IN DOING THINGS: SEVERAL DAYS
5. POOR APPETITE OR OVEREATING: SEVERAL DAYS
3. TROUBLE FALLING OR STAYING ASLEEP OR SLEEPING TOO MUCH: MORE THAN HALF THE DAYS
8. MOVING OR SPEAKING SO SLOWLY THAT OTHER PEOPLE COULD HAVE NOTICED. OR THE OPPOSITE, BEING SO FIGETY OR RESTLESS THAT YOU HAVE BEEN MOVING AROUND A LOT MORE THAN USUAL: NOT AT ALL
7. TROUBLE CONCENTRATING ON THINGS, SUCH AS READING THE NEWSPAPER OR WATCHING TELEVISION: SEVERAL DAYS
SUM OF ALL RESPONSES TO PHQ9 QUESTIONS 1 AND 2: 2
9. THOUGHTS THAT YOU WOULD BE BETTER OFF DEAD, OR OF HURTING YOURSELF: NOT AT ALL
6. FEELING BAD ABOUT YOURSELF - OR THAT YOU ARE A FAILURE OR HAVE LET YOURSELF OR YOUR FAMILY DOWN: SEVERAL DAYS
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
## 2022-03-28 ENCOUNTER — GROUP THERAPY (OUTPATIENT)
Dept: BEHAVIORAL HEALTH | Facility: CLINIC | Age: 32
End: 2022-03-28
Payer: COMMERCIAL

## 2022-03-28 DIAGNOSIS — F33.41 RECURRENT MAJOR DEPRESSIVE DISORDER, IN PARTIAL REMISSION (HCC): ICD-10-CM

## 2022-03-28 PROCEDURE — 90853 GROUP PSYCHOTHERAPY: CPT | Performed by: MARRIAGE & FAMILY THERAPIST

## 2022-03-29 NOTE — GROUP NOTE
"Group Appointment Information    Date: 03/28/22   Attendance Duration: 60 minutes  Number of Participants: 4 participants  Program / Group: Outpatient Therapy  Topics Covered: Other (Comment):Coping with negative thoughts and cognitive distortions        Group Therapy Start Time:  5:00 PM    Attendance: Attended  Participation: Active verbal participation and Attentive    Affect/Mood Range: Normal range  Affect/Mood Display: CWC - Congruent w/Content  Cognition: Alert and Oriented    Evidence of imminent suicide risk: No   Evidence of imminent homicide risk: No     Therapeutic Interventions: Supportive psychotherapy  Progress Toward Treatment Goal: Moderate improvement; \"I was offered a job at work that was insulting.  I am sure how to react.\"    "

## 2022-04-04 ENCOUNTER — GROUP THERAPY (OUTPATIENT)
Dept: BEHAVIORAL HEALTH | Facility: CLINIC | Age: 32
End: 2022-04-04
Payer: COMMERCIAL

## 2022-04-04 DIAGNOSIS — F33.41 RECURRENT MAJOR DEPRESSIVE DISORDER, IN PARTIAL REMISSION (HCC): ICD-10-CM

## 2022-04-04 PROCEDURE — 90853 GROUP PSYCHOTHERAPY: CPT | Performed by: MARRIAGE & FAMILY THERAPIST

## 2022-04-05 NOTE — GROUP NOTE
"Group Appointment Information    Date: 04/04/22   Attendance Duration: 60 minutes  Number of Participants: 4 participants  Program / Group: Outpatient Therapy  Topics Covered: Other (Comment):Group process coping skills and developing skills for emotional regulation        Group Therapy Start Time:  5:00 PM    Attendance: Attended  Participation: Active verbal participation and Attentive    Affect/Mood Range: Normal range and Flexible  Affect/Mood Display: CWC - Congruent w/Content  Cognition: Alert and Oriented    Evidence of imminent suicide risk: No   Evidence of imminent homicide risk: No     Therapeutic Interventions: Emotion clarification and Supportive psychotherapy  Progress Toward Treatment Goal: Moderate improvement; \"I was upset with my supervisor and I realize I  Need to be more direct with her so that she doesn't violate my boundaries as much.     "

## 2022-04-11 ENCOUNTER — APPOINTMENT (OUTPATIENT)
Dept: BEHAVIORAL HEALTH | Facility: CLINIC | Age: 32
End: 2022-04-11
Payer: COMMERCIAL

## 2022-04-18 ENCOUNTER — GROUP THERAPY (OUTPATIENT)
Dept: BEHAVIORAL HEALTH | Facility: CLINIC | Age: 32
End: 2022-04-18
Payer: COMMERCIAL

## 2022-04-18 DIAGNOSIS — F33.41 RECURRENT MAJOR DEPRESSIVE DISORDER, IN PARTIAL REMISSION (HCC): ICD-10-CM

## 2022-04-18 PROCEDURE — 90853 GROUP PSYCHOTHERAPY: CPT | Performed by: MARRIAGE & FAMILY THERAPIST

## 2022-04-19 NOTE — GROUP NOTE
"Group Appointment Information    Date: 04/18/22   Attendance Duration: 60 minutes  Number of Participants: 2 participants  Program / Group: Outpatient Therapy  Topics Covered: Other (Comment): Process skills around coping with stress, anxiety and depression.         Group Therapy Start Time:  5:00 PM    Attendance: Attended  Participation: Active verbal participation and Attentive    Affect/Mood Range: Normal range  Affect/Mood Display: CWC - Congruent w/Content  Cognition: Alert and Oriented    Evidence of imminent suicide risk: No   Evidence of imminent homicide risk: No     Therapeutic Interventions: Emotion clarification and Supportive psychotherapy  Progress Toward Treatment Goal: Mild improvement; \"I was so upset last week.  I quit my job. I am nervous.  I have been searching for a new job and I understand that the job I had was not making me happy.     "

## 2022-04-25 ENCOUNTER — GROUP THERAPY (OUTPATIENT)
Dept: BEHAVIORAL HEALTH | Facility: CLINIC | Age: 32
End: 2022-04-25
Payer: COMMERCIAL

## 2022-04-25 DIAGNOSIS — F41.1 GENERALIZED ANXIETY DISORDER: ICD-10-CM

## 2022-04-25 DIAGNOSIS — F33.41 RECURRENT MAJOR DEPRESSIVE DISORDER, IN PARTIAL REMISSION (HCC): ICD-10-CM

## 2022-04-25 PROCEDURE — 90853 GROUP PSYCHOTHERAPY: CPT | Performed by: MARRIAGE & FAMILY THERAPIST

## 2022-04-26 NOTE — GROUP NOTE
Group Appointment Information    Date: 04/26/22   Attendance Duration: 60 minutes  Number of Participants: 3 participants  Program / Group: IOP - Intensive Outpatient Program  Topics Covered: Other (Comment): Patients processed skills of cognitive diffusion and self compassion.         Group Therapy Start Time:  5:00 PM    Attendance: Attended  Participation: Attentive    Affect/Mood Range: Normal range  Affect/Mood Display: CWC - Congruent w/Content  Cognition: Alert and Oriented    Evidence of imminent suicide risk: No   Evidence of imminent homicide risk: No     Therapeutic Interventions: Emotion clarification and Supportive psychotherapy  Progress Toward Treatment Goal: Significant improvement; patient explained that today would be his last day of participation as he has quit his job and will not have insurance.  The group phrased well wishes for him and patient appeared to feel encouraged.

## 2022-08-18 ENCOUNTER — PATIENT MESSAGE (OUTPATIENT)
Dept: BEHAVIORAL HEALTH | Facility: CLINIC | Age: 32
End: 2022-08-18
Payer: COMMERCIAL

## 2022-08-18 DIAGNOSIS — F33.41 RECURRENT MAJOR DEPRESSIVE DISORDER, IN PARTIAL REMISSION (HCC): ICD-10-CM

## 2022-08-19 RX ORDER — VENLAFAXINE HYDROCHLORIDE 75 MG/1
CAPSULE, EXTENDED RELEASE ORAL
Qty: 90 CAPSULE | Refills: 0 | Status: SHIPPED | OUTPATIENT
Start: 2022-08-19 | End: 2022-11-16 | Stop reason: SDUPTHER

## 2022-08-19 RX ORDER — MIRTAZAPINE 15 MG/1
15 TABLET, FILM COATED ORAL
Qty: 90 TABLET | Refills: 0 | Status: SHIPPED | OUTPATIENT
Start: 2022-08-19 | End: 2022-11-16 | Stop reason: SDUPTHER

## 2022-08-19 RX ORDER — VENLAFAXINE HYDROCHLORIDE 150 MG/1
CAPSULE, EXTENDED RELEASE ORAL
Qty: 90 CAPSULE | Refills: 0 | Status: SHIPPED | OUTPATIENT
Start: 2022-08-19 | End: 2022-11-16 | Stop reason: SDUPTHER

## 2022-08-19 NOTE — PATIENT COMMUNICATION
Patient will schedule an appointment with  once he has insurance again.       Received request via: Patient    Was the patient seen in the last year in this department? Yes    Does the patient have an active prescription (recently filled or refills available) for medication(s) requested? No

## 2022-11-16 ENCOUNTER — PATIENT MESSAGE (OUTPATIENT)
Dept: BEHAVIORAL HEALTH | Facility: CLINIC | Age: 32
End: 2022-11-16
Payer: COMMERCIAL

## 2022-11-16 DIAGNOSIS — F33.41 RECURRENT MAJOR DEPRESSIVE DISORDER, IN PARTIAL REMISSION (HCC): ICD-10-CM

## 2022-11-16 NOTE — PATIENT COMMUNICATION
Received request via: Patient    Was the patient seen in the last year in this department? Yes    Does the patient have an active prescription (recently filled or refills available) for medication(s) requested? No    Does the patient have group home Plus and need 100 day supply (blood pressure, diabetes and cholesterol meds only)? Medication is not for cholesterol, blood pressure or diabetes and Patient does not have SCP

## 2022-11-17 ENCOUNTER — APPOINTMENT (OUTPATIENT)
Dept: BEHAVIORAL HEALTH | Facility: CLINIC | Age: 32
End: 2022-11-17

## 2022-11-17 RX ORDER — VENLAFAXINE HYDROCHLORIDE 150 MG/1
CAPSULE, EXTENDED RELEASE ORAL
Qty: 90 CAPSULE | Refills: 0 | Status: SHIPPED | OUTPATIENT
Start: 2022-11-17

## 2022-11-17 RX ORDER — VENLAFAXINE HYDROCHLORIDE 75 MG/1
CAPSULE, EXTENDED RELEASE ORAL
Qty: 90 CAPSULE | Refills: 0 | Status: SHIPPED | OUTPATIENT
Start: 2022-11-17

## 2022-11-17 RX ORDER — MIRTAZAPINE 15 MG/1
15 TABLET, FILM COATED ORAL
Qty: 90 TABLET | Refills: 0 | Status: SHIPPED | OUTPATIENT
Start: 2022-11-17

## 2024-04-21 ENCOUNTER — OFFICE VISIT (OUTPATIENT)
Dept: URGENT CARE | Facility: PHYSICIAN GROUP | Age: 34
End: 2024-04-21
Payer: COMMERCIAL

## 2024-04-21 VITALS
DIASTOLIC BLOOD PRESSURE: 80 MMHG | HEART RATE: 88 BPM | BODY MASS INDEX: 31.81 KG/M2 | WEIGHT: 240 LBS | TEMPERATURE: 98.7 F | SYSTOLIC BLOOD PRESSURE: 112 MMHG | HEIGHT: 73 IN | OXYGEN SATURATION: 99 % | RESPIRATION RATE: 12 BRPM

## 2024-04-21 DIAGNOSIS — J06.9 UPPER RESPIRATORY TRACT INFECTION, UNSPECIFIED TYPE: ICD-10-CM

## 2024-04-21 DIAGNOSIS — J02.9 PHARYNGITIS, UNSPECIFIED ETIOLOGY: ICD-10-CM

## 2024-04-21 LAB — S PYO DNA SPEC NAA+PROBE: NOT DETECTED

## 2024-04-21 PROCEDURE — 3074F SYST BP LT 130 MM HG: CPT | Performed by: PHYSICIAN ASSISTANT

## 2024-04-21 PROCEDURE — 3079F DIAST BP 80-89 MM HG: CPT | Performed by: PHYSICIAN ASSISTANT

## 2024-04-21 PROCEDURE — 87651 STREP A DNA AMP PROBE: CPT | Performed by: PHYSICIAN ASSISTANT

## 2024-04-21 PROCEDURE — 99213 OFFICE O/P EST LOW 20 MIN: CPT | Performed by: PHYSICIAN ASSISTANT

## 2024-04-21 RX ORDER — DEXTROMETHORPHAN HYDROBROMIDE AND PROMETHAZINE HYDROCHLORIDE 15; 6.25 MG/5ML; MG/5ML
5 SYRUP ORAL EVERY 4 HOURS PRN
Qty: 120 ML | Refills: 0 | Status: SHIPPED | OUTPATIENT
Start: 2024-04-21

## 2024-04-21 ASSESSMENT — ENCOUNTER SYMPTOMS
GASTROINTESTINAL NEGATIVE: 1
CHILLS: 1
FEVER: 0
NAUSEA: 0
PSYCHIATRIC NEGATIVE: 1
EYE PAIN: 0
NEUROLOGICAL NEGATIVE: 1
NECK PAIN: 0
SINUS PAIN: 0
SORE THROAT: 1
COUGH: 0

## 2024-04-21 NOTE — LETTER
[facility] [address] [address]     April 21, 2024    Patient: Justice Infante   YOB: 1990   Date of Visit: 4/21/2024       To Whom It May Concern:    Justice Infante was seen and treated in our department on 4/21/2024. Please excuse him from work today and tomorrow.  He can return on Tuesday if feeling better.    Sincerely,     Seht Cordova P.A.-C.

## 2024-04-21 NOTE — PROGRESS NOTES
Chief Complaint   Patient presents with    Sore Throat     X3days bilateral ear pain, fatigue, chills       HISTORY OF PRESENT ILLNESS: Patient is a 33 y.o. male who presents today because worsening sore throat since yesterday.  Patient states that he had a sore throat earlier in the week which improved and then worsened yesterday.  He describes severe pain with swallowing and no improvement with Tylenol or ibuprofen.  He denies drooling or difficulty managing his secretions.  He also describes a nonproductive cough as well as bilateral ear pain and fatigue and chills.  He is also describing conjunctivitis and has been using red eyedrops.  He is not a contact lens wear.  He denies gritty sensation or photophobia.  He denies periorbital headache.  He does describe rhinorrhea.    Patient Active Problem List    Diagnosis Date Noted    Cholecystitis 01/03/2021    Other insomnia 09/14/2020    Recurrent major depressive disorder, in partial remission (HCC) 04/10/2019    Dyslipidemia 08/29/2018    Obesity (BMI 30.0-34.9) 10/12/2017    OCD (obsessive compulsive disorder) 07/13/2016    Generalized anxiety disorder 06/05/2015       Allergies:Patient has no known allergies.    Current Outpatient Medications Ordered in Epic   Medication Sig Dispense Refill    mirtazapine (REMERON) 15 MG Tab Take 1 Tablet by mouth at bedtime. 90 Tablet 0    venlafaxine (EFFEXOR-XR) 150 MG extended-release capsule Take 1 tablet by mouth once a day.  Combine with 75 mg for a total daily dose of 225 mg once a day 90 Capsule 0    venlafaxine XR (EFFEXOR XR) 75 MG CAPSULE SR 24 HR TAKE 1 CAPSULE DAILY. COMBINE WITH 150MG FOR A TOTAL DAILY DOSE OF 2225 MG ONCE A DAY 90 Capsule 0    albuterol 108 (90 Base) MCG/ACT Aero Soln inhalation aerosol Inhale 2 Puffs every 6 hours as needed for Shortness of Breath. 8.5 g 0    propranolol (INDERAL) 10 MG Tab Take 1 to 2 tablets by mouth once a day for performance anxiety, take 1 hour prior to performance 60  "tablet 0    emtricitabine-TAF (DESCOVY) 200-25 mg Tab tablet Take 1 Tab by mouth every day. (Patient taking differently: Take 1 Tablet by mouth every day. New medication NOT STARTED) 90 Tab 3     No current Epic-ordered facility-administered medications on file.       Past Medical History:   Diagnosis Date    Anxiety     Migraine     OCD (obsessive compulsive disorder)        Social History     Tobacco Use    Smoking status: Former     Current packs/day: 0.00     Types: Cigarettes     Quit date: 2015     Years since quittin.0    Smokeless tobacco: Never   Vaping Use    Vaping Use: Every day   Substance Use Topics    Alcohol use: Not Currently     Comment: social    Drug use: No       Family Status   Relation Name Status    Mo  Alive    Fa  Alive     Family History   Problem Relation Age of Onset    Alcohol/Drug Mother     Alcohol/Drug Father        Review of Systems   Constitutional:  Positive for chills and malaise/fatigue. Negative for fever.   HENT:  Positive for ear discharge, ear pain and sore throat. Negative for congestion and sinus pain.    Eyes:  Negative for pain.   Respiratory:  Negative for cough.    Cardiovascular:  Negative for chest pain.   Gastrointestinal: Negative.  Negative for nausea.   Genitourinary: Negative.    Musculoskeletal:  Negative for neck pain.   Neurological: Negative.    Endo/Heme/Allergies: Negative.    Psychiatric/Behavioral: Negative.     All other systems reviewed and are negative.     Exam:  /80   Pulse 88   Temp 37.1 °C (98.7 °F) (Temporal)   Resp 12   Ht 1.854 m (6' 1\")   Wt 109 kg (240 lb)   SpO2 99%     Physical Exam   Nursing note and vitals reviewed.    Constitutional:   Appropriately groomed, pleasant affect, well nourished, in NAD.    Head:   Normocephalic, atraumatic.    Eyes:   PERRLA, EOM's full, sclera white, conjunctiva mildly erythematous, and medial canthus without exudate bilaterally.    Ears:  Tragus not tender to manipulation.  No " pre-auricular lymphadenopathy or mastoid ttp.  EACs with mild cerumen bilaterally, not erythematous.  bilateral TMs not bulging and or injected without loss of anatomical landmarks.   Hearing grossly intact to voice.    Nose:  Nares bilaterally.  Nasal mucosa not edematous. Sinuses not tender to percussion bilaterally.    Throat:  Mucosa moist without lesions.  Oropharynx erythematous, with mild enlargement of the palatine tonsils bilaterally with no exudates.    Post nasal drainage present.  Soft palate rises symmetrically bilaterally and uvula midline.      Neck: Neck supple, with mild anterior lymphadenopathy that is soft and mobile to palpation. Thyroid non-palpable without tenderness or nodules. No supraclavicular lymphadenopathy.    Lungs:  Respiratory effort not labored without accessory muscle use.  Lungs with clear to auscultation bilaterally without wheezes, rales, or rhonchi.    Heart:  RRR, without murmurs rubs or gallops.  Radial and dorsalis pedis pulse 2+ bilaterally.  No LE edema.    Musculoskeletal:  Gait non-antalgic with a narrow base.    Derm:  Skin without rashes or lesions with good turgor pressure.      Psychiatric:  Mood, affect, and judgement appropriate.     Please note that this dictation was created using voice recognition software. I have made every reasonable attempt to correct obvious errors, but I expect that there are errors of grammar and possibly content that I did not discover before finalizing the note.    Assessment/Plan:  1. Pharyngitis, unspecified etiology  POCT CEPHEID GROUP A STREP - PCR    CANCELED: CoV-2, Flu A/B, And RSV by PCR (Cepheid)      2. Upper respiratory tract infection, unspecified type  POCT CEPHEID GROUP A STREP - PCR    promethazine-dextromethorphan (PROMETHAZINE-DM) 6.25-15 MG/5ML syrup    POCT CoV-2, Flu A/B, RSV by PCR    CANCELED: CoV-2, Flu A/B, And RSV by PCR (Cepheid)        Patient presents with suspected viral upper respiratory infection with  postnasal drip related cough.  Patient had negative viral nasal testing and strep.  Did provide work note.  Recommended continuing with pushing fluids and cough and cold medicine.  Prescribed cough medicine for nighttime use.    Instructed to return to Urgent Care or nearest Emergency Department if symptoms fail to improve, for any change in condition, further concerns, or new concerning symptoms. Patient states understanding of the plan of care and discharge instructions.    Seth Cordova PA-C

## 2024-05-31 NOTE — PROGRESS NOTES
2 mg PO ativan given for q6 hour order, not ciwa prn order.   Subjective:     Justice Infante is a 29 y.o. male who presents for Rash (belly button red, swelling and pain )       Rash   This is a new problem. The problem has been gradually worsening since onset. Pertinent negatives include no diarrhea, fever or vomiting.     Patient reports that about 2 days ago he started to experience swelling, irritation, and pain inside his bellybutton.  He has attempted to use different soaps and has even use OTC hydrocortisone cream with no improvement in the symptoms.  He has ruled out other possible irritants such as new laundry detergents.  Denies fever, chills, abdominal pain, nausea, vomiting, constipation, urinary symptoms, diarrhea.    PMH:  has a past medical history of Anxiety, Migraine, and OCD (obsessive compulsive disorder).    MEDS:   Current Outpatient Medications:   •  cephALEXin (KEFLEX) 500 MG Cap, Take 1 Cap by mouth 4 times a day for 7 days., Disp: 28 Cap, Rfl: 0  •  venlafaxine ER (EFFEXOR) 225 MG TABLET SR 24 HR tablet, TAKE 1 TABLET BY MOUTH ONCE DAILY, Disp: 30 Tab, Rfl: 0  •  busPIRone (BUSPAR) 10 MG Tab tablet, Take 1 Tab by mouth every day., Disp: 90 Tab, Rfl: 0  •  emtricitabine-tenofovir (TRUVADA) 200-300 MG per tablet, Take 1 Tab by mouth every day., Disp: 90 Tab, Rfl: 3    ALLERGIES: No Known Allergies    SURGHX:   Past Surgical History:   Procedure Laterality Date   • APPENDECTOMY  2012     SOCHX:  reports that he quit smoking about 4 years ago. His smoking use included cigarettes. He smoked 0.25 packs per day. He has never used smokeless tobacco. He reports that he does not drink alcohol or use drugs.     FH: Reviewed with patient, not pertinent to this visit.    Review of Systems   Constitutional: Negative.  Negative for fever and malaise/fatigue.   Gastrointestinal: Negative.  Negative for abdominal pain, constipation, diarrhea, nausea and vomiting.   Genitourinary: Negative.  Negative for dysuria.   Skin: Positive for itching and rash.   All  "other systems reviewed and are negative.    Objective:     /80 (BP Location: Right arm, Patient Position: Sitting, BP Cuff Size: Large adult)   Pulse 80   Temp 36.6 °C (97.8 °F) (Temporal)   Resp 16   Ht 1.854 m (6' 1\")   Wt 117 kg (258 lb)   SpO2 96%   BMI 34.04 kg/m²     Physical Exam   Constitutional: He is oriented to person, place, and time. He appears well-developed and well-nourished. He is cooperative.  Non-toxic appearance. No distress.   HENT:   Head: Normocephalic.   Right Ear: External ear normal.   Left Ear: External ear normal.   Nose: Nose normal.   Eyes: Conjunctivae and EOM are normal.   Neck: Normal range of motion.   Cardiovascular: Normal rate and intact distal pulses.   Pulmonary/Chest: Effort normal. No respiratory distress.   Abdominal: Soft. Normal appearance and bowel sounds are normal. He exhibits no distension. There is no tenderness.       Musculoskeletal: Normal range of motion. He exhibits no deformity.   Neurological: He is alert and oriented to person, place, and time. He has normal strength. No sensory deficit. Coordination normal.   Skin: Skin is warm and dry. He is not diaphoretic. No pallor.   Psychiatric: He has a normal mood and affect. His behavior is normal.   Vitals reviewed.       Assessment/Plan:     1. Cellulitis of umbilicus  - cephALEXin (KEFLEX) 500 MG Cap; Take 1 Cap by mouth 4 times a day for 7 days.  Dispense: 28 Cap; Refill: 0     Rx as above sent electronically.    Discussed close monitoring and supportive measures including increasing fluids and rest as well as OTC symptom management including acetaminophen and/or ibuprofen PRN pain and/or fever.    Patient has an appointment with his PCP in 4 days.    Patient advised to: Return for 1) Symptoms don't improve or worsen, or go to ER, 2) Follow up with primary care in 7-10 days.    Differential diagnosis, natural history, supportive care, and indications for immediate follow-up discussed. All questions " answered. Patient agrees with the plan of care.

## (undated) DEVICE — GLOVE BIOGEL INDICATOR SZ 7.5 SURGICAL PF LTX - (50PR/BX 4BX/CA)

## (undated) DEVICE — SODIUM CHL IRRIGATION 0.9% 1000ML (12EA/CA)

## (undated) DEVICE — SUTURE GENERAL

## (undated) DEVICE — ELECTRODE DUAL RETURN W/ CORD - (50/PK)

## (undated) DEVICE — SUTURE 4-0 VICRYL PLUS FS-2 - 27 INCH (36/BX)

## (undated) DEVICE — HEAD HOLDER JUNIOR/ADULT

## (undated) DEVICE — KIT ANESTHESIA W/CIRCUIT & 3/LT BAG W/FILTER (20EA/CA)

## (undated) DEVICE — TROCAR 5X100 NON BLADED Z-TH - READ KII (6/BX)

## (undated) DEVICE — TROCAR Z THREAD11MM OPTICAL - NON BLADED(6/BX)

## (undated) DEVICE — ELECTRODE 850 FOAM ADHESIVE - HYDROGEL RADIOTRNSPRNT (50/PK)

## (undated) DEVICE — BANDAID SHEER STRIP 3/4 IN (100EA/BX 12BX/CA)

## (undated) DEVICE — BANDAID X-LARGE 2 X 4 IN LF (50EA/BX)

## (undated) DEVICE — LACTATED RINGERS INJ 1000 ML - (14EA/CA 60CA/PF)

## (undated) DEVICE — NEEDLE INSFL 120MM 14GA VRRS - (20/BX)

## (undated) DEVICE — TUBING INSUFFLATION - (10/BX)

## (undated) DEVICE — MASK ANESTHESIA ADULT  - (100/CA)

## (undated) DEVICE — GLOVE BIOGEL INDICATOR SZ 6.5 SURGICAL PF LTX - (50PR/BX 4BX/CA)

## (undated) DEVICE — TUBING CLEARLINK DUO-VENT - C-FLO (48EA/CA)

## (undated) DEVICE — CHLORAPREP 26 ML APPLICATOR - ORANGE TINT(25/CA)

## (undated) DEVICE — STERI STRIP COMPOUND BENZOIN - TINCTURE 0.6ML WITH APPLICATOR (40EA/BX)

## (undated) DEVICE — GOWN SURGICAL X-LARGE ULTRA - FILM-REINFORCED (20/CA)

## (undated) DEVICE — TUBE E-T HI-LO CUFF 7.5MM (10EA/PK)

## (undated) DEVICE — CANNULA W/SEAL 5X100 Z-THRE - ADED KII (12/BX)

## (undated) DEVICE — SET SUCTION/IRRIGATION WITH DISPOSABLE TIP (6/CA )PART #0250-070-520 IS A SUB

## (undated) DEVICE — SUCTION INSTRUMENT YANKAUER BULBOUS TIP W/O VENT (50EA/CA)

## (undated) DEVICE — GLOVE SZ 8 BIOGEL PI MICRO - PF LF (50PR/BX)

## (undated) DEVICE — PROTECTOR ULNA NERVE - (36PR/CA)

## (undated) DEVICE — TROCARCANN&SEAL 5X55 ZTHREAD - 12/BX

## (undated) DEVICE — HANDPIECE 10FT INTPLS SCT PLS IRRIGATION HAND CONTROL SET (6/PK)

## (undated) DEVICE — SET TUBING PNEUMOCLEAR HIGH FLOW SMOKE EVACUATION (10EA/BX)

## (undated) DEVICE — SLEEVE, VASO, THIGH, MED

## (undated) DEVICE — GOWN WARMING STANDARD FLEX - (30/CA)

## (undated) DEVICE — RESERVOIR SUCTION 100 CC - SILICONE (20EA/CA)

## (undated) DEVICE — SET EXTENSION WITH 2 PORTS (48EA/CA) ***PART #2C8610 IS A SUBSTITUTE*****

## (undated) DEVICE — TROCAR5X55 KII SHIELDED SYS - (6/BX)

## (undated) DEVICE — CANISTER SUCTION 3000ML MECHANICAL FILTER AUTO SHUTOFF MEDI-VAC NONSTERILE LF DISP  (40EA/CA)

## (undated) DEVICE — CLOSURE SKIN STRIP 1/2 X 4 IN - (STERI STRIP) (50/BX 4BX/CA)

## (undated) DEVICE — SET LEADWIRE 5 LEAD BEDSIDE DISPOSABLE ECG (1SET OF 5/EA)

## (undated) DEVICE — BAG RETRIEVAL 10ML (10EA/BX)

## (undated) DEVICE — SENSOR SPO2 NEO LNCS ADHESIVE (20/BX) SEE USER NOTES

## (undated) DEVICE — SUTURE 0 VICRYL PLUS UR-6 - 27 INCH (36/BX)

## (undated) DEVICE — GLOVE BIOGEL PI INDICATOR SZ 6.5 SURGICAL PF LF - (50/BX 4BX/CA)

## (undated) DEVICE — GLOVE BIOGEL SZ 6 PF LATEX - (50EA/BX 4BX/CA)

## (undated) DEVICE — GLOVE BIOGEL SZ 6.5 SURGICAL PF LTX (50PR/BX 4BX/CA)

## (undated) DEVICE — NEPTUNE 4 PORT MANIFOLD - (20/PK)

## (undated) DEVICE — GLOVE BIOGEL SZ 7.5 SURGICAL PF LTX - (50PR/BX 4BX/CA)

## (undated) DEVICE — PACK LAP CHOLE OR - (2EA/CA)

## (undated) DEVICE — TUBE CONNECT SUCTION CLEAR 120 X 1/4" (50EA/CA)"

## (undated) DEVICE — TROCAR Z THREAD 11 X 100 - BLADED (6/BX)

## (undated) DEVICE — SUTURE 2-0 ETHILON FS - (36/BX) 18 INCH

## (undated) DEVICE — Device

## (undated) DEVICE — APPLICATOR COTTON TIP 6 IN - STERILE (2EA/PK 100PK/BX)

## (undated) DEVICE — GLOVE BIOGEL PI INDICATOR SZ 8.0 SURGICAL PF LF -(50/BX 4BX/CA)